# Patient Record
Sex: MALE | Race: WHITE
[De-identification: names, ages, dates, MRNs, and addresses within clinical notes are randomized per-mention and may not be internally consistent; named-entity substitution may affect disease eponyms.]

---

## 2018-02-08 ENCOUNTER — HOSPITAL ENCOUNTER (INPATIENT)
Dept: HOSPITAL 42 - ED | Age: 78
LOS: 12 days | Discharge: NURSING, REHAB OR LONG-TERM CARE | DRG: 477 | End: 2018-02-20
Attending: INTERNAL MEDICINE | Admitting: INTERNAL MEDICINE
Payer: MEDICARE

## 2018-02-08 VITALS — BODY MASS INDEX: 29.2 KG/M2

## 2018-02-08 DIAGNOSIS — N17.9: ICD-10-CM

## 2018-02-08 DIAGNOSIS — K57.30: ICD-10-CM

## 2018-02-08 DIAGNOSIS — I87.8: ICD-10-CM

## 2018-02-08 DIAGNOSIS — D72.819: ICD-10-CM

## 2018-02-08 DIAGNOSIS — E87.8: ICD-10-CM

## 2018-02-08 DIAGNOSIS — I48.1: ICD-10-CM

## 2018-02-08 DIAGNOSIS — C79.51: Primary | ICD-10-CM

## 2018-02-08 DIAGNOSIS — E87.5: ICD-10-CM

## 2018-02-08 DIAGNOSIS — E55.9: ICD-10-CM

## 2018-02-08 DIAGNOSIS — I08.1: ICD-10-CM

## 2018-02-08 DIAGNOSIS — Z80.8: ICD-10-CM

## 2018-02-08 DIAGNOSIS — I89.0: ICD-10-CM

## 2018-02-08 DIAGNOSIS — M47.9: ICD-10-CM

## 2018-02-08 DIAGNOSIS — J98.11: ICD-10-CM

## 2018-02-08 DIAGNOSIS — R17: ICD-10-CM

## 2018-02-08 DIAGNOSIS — K40.90: ICD-10-CM

## 2018-02-08 DIAGNOSIS — Z87.891: ICD-10-CM

## 2018-02-08 DIAGNOSIS — I48.0: ICD-10-CM

## 2018-02-08 DIAGNOSIS — N13.9: ICD-10-CM

## 2018-02-08 DIAGNOSIS — E87.2: ICD-10-CM

## 2018-02-08 DIAGNOSIS — M87.9: ICD-10-CM

## 2018-02-08 DIAGNOSIS — K59.00: ICD-10-CM

## 2018-02-08 DIAGNOSIS — N18.3: ICD-10-CM

## 2018-02-08 DIAGNOSIS — J43.9: ICD-10-CM

## 2018-02-08 DIAGNOSIS — E22.2: ICD-10-CM

## 2018-02-08 DIAGNOSIS — I48.2: ICD-10-CM

## 2018-02-08 DIAGNOSIS — Z79.01: ICD-10-CM

## 2018-02-08 DIAGNOSIS — M81.0: ICD-10-CM

## 2018-02-08 DIAGNOSIS — Z79.899: ICD-10-CM

## 2018-02-08 DIAGNOSIS — I50.43: ICD-10-CM

## 2018-02-08 DIAGNOSIS — E83.42: ICD-10-CM

## 2018-02-08 DIAGNOSIS — M10.9: ICD-10-CM

## 2018-02-08 DIAGNOSIS — E78.00: ICD-10-CM

## 2018-02-08 DIAGNOSIS — M16.11: ICD-10-CM

## 2018-02-08 DIAGNOSIS — E87.6: ICD-10-CM

## 2018-02-08 DIAGNOSIS — K42.9: ICD-10-CM

## 2018-02-08 DIAGNOSIS — R33.9: ICD-10-CM

## 2018-02-08 DIAGNOSIS — D64.9: ICD-10-CM

## 2018-02-08 DIAGNOSIS — R40.2412: ICD-10-CM

## 2018-02-08 DIAGNOSIS — Z91.19: ICD-10-CM

## 2018-02-08 DIAGNOSIS — I27.21: ICD-10-CM

## 2018-02-08 DIAGNOSIS — N28.1: ICD-10-CM

## 2018-02-08 DIAGNOSIS — D68.9: ICD-10-CM

## 2018-02-08 DIAGNOSIS — C61: ICD-10-CM

## 2018-02-08 DIAGNOSIS — N40.0: ICD-10-CM

## 2018-02-08 DIAGNOSIS — E78.5: ICD-10-CM

## 2018-02-08 DIAGNOSIS — I42.0: ICD-10-CM

## 2018-02-08 DIAGNOSIS — M40.204: ICD-10-CM

## 2018-02-08 DIAGNOSIS — I13.0: ICD-10-CM

## 2018-02-08 DIAGNOSIS — R31.0: ICD-10-CM

## 2018-02-08 LAB
% IRON SATURATION: 15 % (ref 20–55)
ALBUMIN SERPL-MCNC: 3.5 G/DL (ref 3–4.8)
ALBUMIN/GLOB SERPL: 1.1 {RATIO} (ref 1.1–1.8)
ALT SERPL-CCNC: 39 U/L (ref 7–56)
APPEARANCE UR: CLEAR
APTT BLD: 33.9 SECONDS (ref 25.1–36.5)
AST SERPL-CCNC: 43 U/L (ref 17–59)
BACTERIA #/AREA URNS HPF: (no result) /[HPF]
BASE EXCESS BLDV CALC-SCNC: -2.6 MMOL/L (ref 0–2)
BASE EXCESS BLDV CALC-SCNC: 0.2 MMOL/L (ref 0–2)
BASOPHILS # BLD AUTO: 0.06 K/MM3 (ref 0–2)
BASOPHILS NFR BLD: 1.9 % (ref 0–3)
BILIRUB UR-MCNC: NEGATIVE MG/DL
BNP SERPL-MCNC: (no result) PG/ML (ref 0–450)
BUN SERPL-MCNC: 13 MG/DL (ref 7–21)
CALCIUM SERPL-MCNC: 9.9 MG/DL (ref 8.4–10.5)
CK MB SERPL-MCNC: 5.7 NG/ML (ref 0–3.6)
CK MB%: 2.3 % (ref 2.5–3)
COLOR UR: YELLOW
EOSINOPHIL # BLD: 0.1 10*3/UL (ref 0–0.7)
EOSINOPHIL NFR BLD: 1.9 % (ref 1.5–5)
EPI CELLS #/AREA URNS HPF: (no result) /HPF (ref 0–5)
ERYTHROCYTE [DISTWIDTH] IN BLOOD BY AUTOMATED COUNT: 15.5 % (ref 11.5–14.5)
GFR NON-AFRICAN AMERICAN: 58
GLUCOSE UR STRIP-MCNC: NEGATIVE MG/DL
GRANULOCYTES # BLD: 1.54 10*3/UL (ref 1.4–6.5)
GRANULOCYTES NFR BLD: 50.1 % (ref 50–68)
HGB BLD-MCNC: 14.4 G/DL (ref 14–18)
INR PPP: 1.56 (ref 0.93–1.08)
IRON SERPL-MCNC: 51 UG/DL (ref 45–180)
LEUKOCYTE ESTERASE UR-ACNC: NEGATIVE LEU/UL
LYMPHOCYTES # BLD: 0.9 10*3/UL (ref 1.2–3.4)
LYMPHOCYTES NFR BLD AUTO: 30.5 % (ref 22–35)
MAGNESIUM SERPL-MCNC: 1.7 MG/DL (ref 1.7–2.2)
MCH RBC QN AUTO: 29 PG (ref 25–35)
MCHC RBC AUTO-ENTMCNC: 34.8 G/DL (ref 31–37)
MCV RBC AUTO: 83.5 FL (ref 80–105)
MONOCYTES # BLD AUTO: 0.5 10*3/UL (ref 0.1–0.6)
MONOCYTES NFR BLD: 15.6 % (ref 1–6)
PH BLDV: 7.33 [PH] (ref 7.32–7.43)
PH BLDV: 7.39 [PH] (ref 7.32–7.43)
PH UR STRIP: 6 [PH] (ref 4.7–8)
PLATELET # BLD: 164 10^3/UL (ref 120–450)
PMV BLD AUTO: 9.9 FL (ref 7–11)
PROT UR STRIP-MCNC: (no result) MG/DL
PROTHROMBIN TIME: 18 SECONDS (ref 9.4–12.5)
RBC # BLD AUTO: 4.96 10^6/UL (ref 3.5–6.1)
RBC # UR STRIP: (no result) /UL
RBC #/AREA URNS HPF: (no result) /HPF (ref 0–2)
SP GR UR STRIP: 1.01 (ref 1–1.03)
T4 FREE SERPL-MCNC: 1.5 NG/DL (ref 0.78–2.19)
T4 SERPL-MCNC: 5.9 UG/DL (ref 5.5–11)
TIBC SERPL-MCNC: 340 UG/DL (ref 261–462)
TROPONIN I SERPL-MCNC: 0.03 NG/ML
TROPONIN I SERPL-MCNC: 0.03 NG/ML
URATE SERPL-MCNC: 7.9 MG/DL (ref 3.5–8.5)
URINE NITRATE: NEGATIVE
UROBILINOGEN UR STRIP-ACNC: 0.2 E.U./DL
VENOUS BLOOD FIO2: 21 %
VENOUS BLOOD FIO2: 21 %
VENOUS BLOOD GAS PCO2: 36 (ref 40–60)
VENOUS BLOOD GAS PCO2: 51 (ref 40–60)
VENOUS BLOOD GAS PO2: 32 MM/HG (ref 30–55)
VENOUS BLOOD GAS PO2: 67 MM/HG (ref 30–55)
WBC # BLD AUTO: 3.1 10^3/UL (ref 4.5–11)
WBC #/AREA URNS HPF: (no result) /HPF (ref 0–6)

## 2018-02-08 RX ADMIN — DOBUTAMINE HYDROCHLORIDE PRN MLS/HR: 200 INJECTION INTRAVENOUS at 21:57

## 2018-02-08 RX ADMIN — ENOXAPARIN SODIUM SCH: 100 INJECTION SUBCUTANEOUS at 20:07

## 2018-02-08 NOTE — US
HISTORY:

Leg pain and swelling. Evaluate for DVT



PHYSICIAN(S):  Adam Shine MD.



TECHNIQUE:

Duplex sonography and color-flow Doppler with graded compression were 

used to evaluate the deep venous systems of both lower extremities. 

The exam is limited by edema.  The tibial veins are not well seen.



FINDINGS:

The visualized deep venous systems of both lower extremities are 

sonographically normal and compressible. Normal wave forms and 

augmentation are seen. There is no sonographic evidence for deep 

venous thrombosis in the visualized segments of both lower 

extremities.



IMPRESSION:

No sonographic evidence for deep venous thrombosis in the visualized 

segments of both lower extremities. none

## 2018-02-08 NOTE — ED PDOC
Arrival/HPI





- General


Time Seen by Provider: 02/08/18 15:20


Historian: Patient





- History of Present Illness


Narrative History of Present Illness (Text): 





02/08/18 15:27


78yo male with PMhx of hypertension bib BLS for b/l lower leg swelling/pain. 

States he only noticed the swelling yesterday. He reports remote history of 

gout and thought it was gout. He admits to BALLESTEROS for few weeks now. States he 

have not seen his PMD for a while now and have not taken any antitussive for a 

while now. He denies diaphoresis, chest pain, dizziness, nausea, vomiting, 

ripping/tearing upper back pain, trauma, any other complaint.





Past Medical History





- Provider Review


Nursing Documentation Reviewed: Yes





- Infectious Disease


Hx of Infectious Diseases: None





- Tetanus Immunization


Tetanus Immunization: Unknown





- Past Medical History


Past Medical History: No Previous





- Cardiac


Hx Hypertension: Yes





- Musculoskeletal/Rheumatological


Hx Musculoskeletal Disorders: Yes


Hx Unsteady Gait: Yes





- Psychiatric


Hx Depression: No


Hx Emotional Abuse: No


Hx Physical Abuse: No


Hx Substance Use: No





- Past Surgical History


Past Surgical History: No Previous





- Suicidal Assessment


Feels Threatened In Home Enviroment: No





Family/Social History





- Physician Review


Nursing Documentation Reviewed: Yes


Family/Social History: Unknown Family HX


Smoking Status: Never Smoked


Hx Alcohol Use: No


Hx Substance Use: No





Allergies/Home Meds


Allergies/Adverse Reactions: 


Allergies





No Known Allergies Allergy (Verified 01/28/14 15:34)


 








Home Medications: 


 Home Meds











 Medication  Instructions  Recorded  Confirmed


 


Cholecalciferol [Vitamin D 1000 IU] 2 cap PO DAILY 02/08/18 02/08/18


 


Febuxostat [Uloric] 1 tab PO DAILY 02/08/18 02/08/18


 


Folic Acid [Folic Acid] 4 mg PO DAILY 02/08/18 02/08/18


 


Magnesium Oxide [Mag-Ox] 1 tab PO BID 02/08/18 02/08/18


 


Nebivolol [Bystolic] 1 tab PO DAILY 02/08/18 02/08/18


 


Valsartan [Diovan] 1 tab PO DAILY 02/08/18 02/08/18


 


Warfarin [Coumadin] 1 tab PO DAILY 02/08/18 02/08/18


 


amLODIPine [Norvasc] 1 tab PO BID 02/08/18 02/08/18


 


hydrALAZINE [Apresoline] 1 tab PO QID 02/08/18 02/08/18














Review of Systems





- Physician Review


All systems were reviewed & negative as marked: Yes





- Review of Systems


Constitutional: Normal


Eyes: Normal


ENT: Normal


Respiratory: SOB


Cardiovascular: Edema, Calf Pain, BALLESTEROS.  absent: Chest Pain, Palpitations, 

Orthopnea, Syncope


Gastrointestinal: Normal


Genitourinary Male: Normal


Musculoskeletal: Normal


Skin: Normal


Neurological: Normal


Endocrine: Normal


Hemo/Lymphatic: Normal


Psychiatric: Normal





Physical Exam


Vital Signs Reviewed: Yes


Vital Signs











  Temp Pulse Resp BP Pulse Ox


 


 02/08/18 20:50   113 H   121/95 H 


 


 02/08/18 19:17   113 H  18  112/74  100


 


 02/08/18 16:45   86  18  107/81  100


 


 02/08/18 16:41     125/79 


 


 02/08/18 16:06   93 H  20  129/80  100


 


 02/08/18 15:52   105 H   135/92 H  100


 


 02/08/18 15:51    22   100


 


 02/08/18 15:41   154 H   157/99 H 


 


 02/08/18 15:40  97.3 F L  153 H  24  157/99 H  100











Temperature: Afebrile


Blood Pressure: Normal


Pulse: Regular


Respiratory Rate: Normal


Appearance: Positive for: Well-Appearing, Non-Toxic, Comfortable


Pain Distress: None


Mental Status: Positive for: Alert and Oriented X 3





- Systems Exam


Head: Present: Atraumatic, Normocephalic


Pupils: Present: PERRL


Extroacular Muscles: Present: EOMI


Conjunctiva: Present: Normal


Mouth: Present: Moist Mucous Membranes


Neck: Present: Normal Range of Motion


Respiratory/Chest: Present: Clear to Auscultation, Good Air Exchange, Other (

Crackles - scattered).  No: Respiratory Distress, Accessory Muscle Use, 

Decreased Breath Sounds, Rales, Retracting, Rhonchi


Cardiovascular: Present: Regular Rate and Rhythm, Normal S1, S2.  No: Murmurs


Abdomen: Present: Normal Bowel Sounds.  No: Tenderness, Distention, Peritoneal 

Signs


Back: Present: Normal Inspection


Upper Extremity: Present: Normal Inspection.  No: Cyanosis, Edema


Lower Extremity: Present: Edema (3+ Edema), CALF TENDERNESS (Left lower calf), 

NORMAL PULSES, Normal ROM, Neurovascularly Intact.  No: Tenderness


Neurological: Present: GCS=15, CN II-XII Intact, Speech Normal


Skin: Present: Warm, Dry, Normal Color.  No: Rashes


Psychiatric: Present: Alert, Oriented x 3, Normal Insight, Normal Concentration





Medical Decision Making


ED Course and Treatment: 





02/08/18 23:33


PT in ED for stated history. 





EKG Afib with RVR @ 169bpm. 





Rate improved with Cardizem bolus in ED and Heparin bolus was given in ED.





Lab was reviewed and hyponatremia was noted. Elevated lactic acid level was 

noted was noted in the first VBG, this was likely secondary to the Afib and 

Elevated BNP. Pt was afebrile and does not meet criteria for sepsis. 





Lasix was given for the elevated BNP





LE doppler was negative for DVT b/l





Case was DW Dr. Márquez, he accepted pt for admission and saw pt in ED at the 

bedside. He requested Dr. Farah consult.





cAse was DW Dr. Edge who is covering Dr. Farah and he request Lovonox 100mg 

SQ instead of the Heparin drip.





All result and plan was DW the pt and he agreed.




















- Lab Interpretations


Lab Results: 








 02/08/18 15:45 





 02/08/18 15:45 





 Lab Results





02/08/18 15:45: TSH 3rd Generation 2.34


02/08/18 15:45: Sodium 127 L, Chloride 92 L, Potassium 4.4, Carbon Dioxide 21, 

Anion Gap 19, BUN 13, Creatinine 1.2, Est GFR (African Amer) > 60, Est GFR (Non-

Af Amer) 58, Random Glucose 110, Calcium 9.9, Total Bilirubin 2.1 H, AST 43, 

ALT 39, Alkaline Phosphatase 112, Lactate Dehydrogenase 684, Total Creatine 

Kinase 245 H, CK-MB (CK-2) 5.7 H, CK-MB (CK-2) % 2.3 L, Troponin I 0.03  D, NT-

Pro-B Natriuret Pep 80797 H, Total Protein 6.7, Albumin 3.5, Globulin 3.2, 

Albumin/Globulin Ratio 1.1


02/08/18 15:45: pO2 67 H, VBG pH 7.39, VBG pCO2 36.0 L, VBG HCO3 21.8, VBG 

Total CO2 22.9, VBG O2 Sat (Calc) 95.8 H, VBG Base Excess -2.6 L, VBG Potassium 

4.7, Sodium 124.0 L, Chloride 91.0 L, Glucose 120 H, Lactate 2.5 H, FiO2 21.0, 

Venous Blood Potassium 4.7


02/08/18 15:45: WBC 3.1 L, RBC 4.96, Hgb 14.4, Hct 41.4 L, MCV 83.5, MCH 29.0, 

MCHC 34.8, RDW 15.5 H, Plt Count 164, MPV 9.9, Gran % 50.1, Lymph % (Auto) 30.5

, Mono % (Auto) 15.6 H, Eos % (Auto) 1.9, Baso % (Auto) 1.9, Gran # 1.54, Lymph 

# (Auto) 0.9 L, Mono # (Auto) 0.5, Eos # (Auto) 0.1, Baso # (Auto) 0.06


02/08/18 15:45: PT 18.0 H, INR 1.56 H, APTT 33.9











- RAD Interpretation


Radiology Orders: 








02/08/18 15:25


DUPLEX LOWER EXTRM VEIN BILAT [US] Stat 





02/08/18 15:26


CHEST PORTABLE [RAD] Stat 














- Medication Orders


Current Medication Orders: 








Allopurinol (Zyloprim)  300 mg PO DAILY [facility]


Aspirin (Ecotrin)  81 mg PO DAILY [facility]


   Last Admin: 02/08/18 20:48  Dose: 81 mg





Atorvastatin Calcium (Lipitor)  40 mg PO DIN [facility]


Docusate Sodium (Colace)  100 mg PO TID [facility]


Enoxaparin Sodium (Lovenox)  100 mg SC Q12H [facility]


   PRN Reason: Protocol


   Last Admin: 02/08/18 20:07  Dose:  





Furosemide (Lasix)  40 mg IVP Q12H [facility]


   Last Admin: 02/08/18 20:50  Dose: 40 mg





MAR Blood Pressure


 Document     02/08/18 20:50  GMD  (Rec: 02/08/18 20:50  GMD  [id number])


     Blood Pressure


      Blood Pressure (100//90)             121/95


IVP Administration


 Document     02/08/18 20:50  GMD  (Rec: 02/08/18 20:50  GMD  [id number])


     Charges for Administration


      # of IVP Administrations                   1





Dobutamine HCl/Dextrose (Dobutamine/Dextrose 5% 500mg/250ml)  500 mg in 250 mls 

@ 14.696 mls/hr IV .Q17H1M PRN; Protocol; 5 MCG/KG/MIN


   PRN Reason: TITRATE PER PROTOCOL


   Last Admin: 02/08/18 21:57  Dose: 5 mcg/kg/min, 14.696 mls/hr





eMAR Start Stop


 Document     02/08/18 21:57  HAILEY  (Rec: 02/08/18 21:58  HAILEY  [id number])


     Intravenous Solution


      Start Date                                 02/08/18


      Start Time                                 21:58


MAR Pulse and Blood Pressure


 Document     02/08/18 21:57  HAILEY  (Rec: 02/08/18 21:58    [id number])


     Pulse


      Pulse Rate (60-90)                         115


     Blood Pressure


      Blood Pressure (100//90)             131/95


Titration Intervention


 Document     02/08/18 21:57  HAILEY  (Rec: 02/08/18 21:58  HAILEY  [id number])


     Titration Intake


      Waste Amount                               0


      Container Volume                           250


     Titration Dosing


      Titration Dose                             5


      IV Rate                                    14.696


      Intake/Decrease                            Started





Metoprolol Tartrate (Lopressor)  25 mg PO Q12H [facility]


   Last Admin: 02/08/18 20:50  Dose: 25 mg





MAR Pulse and Blood Pressure


 Document     02/08/18 20:50  GMD  (Rec: 02/08/18 20:50  GMD  [id number])


     Pulse


      Pulse Rate (60-90)                         113


     Blood Pressure


      Blood Pressure (100//90)             121/95





Pantoprazole Sodium (Protonix Ec Tab)  20 mg PO 0600,1600 [facility]AS





Discontinued Medications





Diltiazem HCl (Cardizem)  15 mg IVP STAT STA


   Stop: 02/08/18 15:36


   Last Admin: 02/08/18 15:40  Dose:  





Diltiazem HCl (Cardizem)  20 mg IVP STAT STA


   Stop: 02/08/18 15:40


   Last Admin: 02/08/18 15:41  Dose: 20 mg





IVP Administration


 Document     02/08/18 15:41  GMD  (Rec: 02/08/18 15:41  GMD  [id number])


     Charges for Administration


      # of IVP Administrations                   1


MAR Pulse and Blood Pressure


 Document     02/08/18 15:41  GMD  (Rec: 02/08/18 15:41  GMD  [id number])


     Pulse


      Pulse Rate (60-90)                         154


     Blood Pressure


      Blood Pressure (100//90)             157/99





Enoxaparin Sodium (Lovenox)  100 mg SC ONCE STA


   PRN Reason: Protocol


   Stop: 02/08/18 17:14


   Last Admin: 02/08/18 17:37  Dose: 100 mg





Subcutaneous Administrations


 Document     02/08/18 17:37  GMD  (Rec: 02/08/18 17:37  GMD  [id number])


     Injection Site


      MAR Injection Site                         Left Abdomen


     Charges for Administration


      # of Subcutaneous Administrations          1





Furosemide (Lasix)  40 mg IVP STAT STA


   Stop: 02/08/18 16:33


   Last Admin: 02/08/18 16:41  Dose: 40 mg





MAR Blood Pressure


 Document     02/08/18 16:41  GMD  (Rec: 02/08/18 16:41  GMD  [id number])


     Blood Pressure


      Blood Pressure (100//90)             125/79


IVP Administration


 Document     02/08/18 16:41  GMD  (Rec: 02/08/18 16:41  GMD  [id number])


     Charges for Administration


      # of IVP Administrations                   1





Heparin Sodium (Porcine) (Heparin)  4,000 units IV ONCE ONE


   PRN Reason: Protocol


   Stop: 02/08/18 16:49


   Last Admin: 02/08/18 17:37  Dose: 4,000 units





eMAR Start Stop


 Document     02/08/18 17:37  GMD  (Rec: 02/08/18 17:37  GMD  [id number])


     Intravenous Solution


      Start Date                                 02/08/18


      Start Time                                 17:37














Disposition/Present on Arrival





- Present on Arrival


Any Indicators Present on Arrival: No


History of DVT/PE: No


History of Uncontrolled Diabetes: No


Urinary Catheter: No


History Surgical Site Infection Following: None





- Disposition


Have Diagnosis and Disposition been Completed?: Yes


Diagnosis: 


 Atrial fibrillation, CHF (congestive heart failure), Hyponatremia





Disposition: HOSPITALIZED


Disposition Time: 17:00


Patient Problems: 


 Current Active Problems











Problem Status Onset


 


Atrial fibrillation Acute  


 


CHF (congestive heart failure) Acute  


 


Hyponatremia Acute  











Condition: FAIR

## 2018-02-08 NOTE — CT
EXAM:

  CT Chest Without Intravenous Contrast



CLINICAL HISTORY:

  77 years old, male; Signs and symptoms; Other: Bilateral leg swelling; 

Shortness of breath; Additional info: Chf/anasarca



TECHNIQUE:

  Axial computed tomography images of the chest without intravenous contrast.  

All CT scans at this facility use one or more dose reduction techniques, viz.: 

automated exposure control; ma/kV adjustment per patient size (including 

targeted exams where dose is matched to indication; i.e. head); or iterative 

reconstruction technique.

  Coronal and sagittal reformatted images were created and reviewed.



COMPARISON:

  No relevant prior studies available.



FINDINGS:

  Limitations:  Lack of intravenous contrast.  Motion artifact - mild.

  Lungs:  Early to mild centrilobular and paraseptal emphysematous changes.  

Mild atelectasis/scarring.  No consolidation.  Few pulmonary nodules, up to 0.3 

cm.

  Pleural space:  Small bilateral pleural effusions, right greater than left.

  Heart:  Mild cardiomegaly.  No significant pericardial effusion.  Coronary 

artery calcifications.

  Bones/joints:  Few scattered sclerotic lesions within visualized bones.  Mild 

degenerative changes of spine.  No acute fracture.

  Soft tissues:  Mild-to-moderate diffuse stranding within subcutaneous tissues.

  Vasculature:  Minimal atherosclerotic disease.

  Lymph nodes:  No pathologically enlarged lymph nodes.



IMPRESSION:     

1.  Mild anasarca.

2.  Bilateral pleural effusions, RIGHT > LEFT.

3.  Emphysema.

4.  Sclerotic bone lesions compatible with metastases.  Clinical correlation is 

needed.

5.  Pulmonary nodules.  For low-risk patients, no follow-up is necessary.  For 

high-risk patients (smoking history or other known risk factors) an optional CT 

at 12 months could be performed.

6.  Incidental/non-acute findings are described above.



_______________________________________________



EXAM:

  CT Abdomen and Pelvis Without Intravenous Contrast



CLINICAL HISTORY:

  77 years old, male; Signs and symptoms; Other: Bilateral leg swelling; 

Shortness of breath; Additional info: Chf/anasarca



TECHNIQUE:

  Axial computed tomography images of the abdomen and pelvis without 

intravenous contrast.  All CT scans at this facility use one or more dose 

reduction techniques, viz.: automated exposure control; ma/kV adjustment per 

patient size (including targeted exams where dose is matched to indication; 

i.e. head); or iterative reconstruction technique.

  Coronal and sagittal reformatted images were created and reviewed.



COMPARISON:

  No relevant prior studies available.



FINDINGS:

  Limitations:  Lack of intravenous contrast.  Motion artifact - mild.



 ABDOMEN:

  Liver:  Unremarkable.

  Gallbladder and bile ducts:  No calcified stones.  No ductal dilation.

  Pancreas:  Unremarkable.  No ductal dilation.

  Spleen:  No splenomegaly.

  Adrenals:  Mild hypertrophy of adrenal glands.

  Kidneys and ureters:  Scarring of right kidney.  Probable RIGHT renal cyst.  

Too small to characterize lesion within LEFT kidney.  Small vascular 

calcification vs calculus within LEFT kidney.  No hydronephrosis.

  Stomach and bowel:  Few scattered diverticula within colon.  No associated 

inflammatory stranding.  No definite mural thickening.  No obstruction.

  Appendix:  No findings to suggest acute appendicitis.



 PELVIS:

  Bladder:  Unremarkable.  No stones.

  Reproductive:  Unremarkable as visualized.



 ABDOMEN and PELVIS:

  Intraperitoneal space:  Small free fluid within abdomen.

  Bones/joints:  Several scattered sclerotic lesions within the visualized 

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  Degenerative 

changes of spine.  No acute fracture.

  Soft tissues:  Moderate diffuse stranding throughout subcutaneous tissues.  

Small umbilical hernia containing fat.  Small inguinal hernias containing fat 

and fluid.

  Vasculature:  Moderate atherosclerotic disease.  No aneurysm.

  Lymph nodes:  Few subcentimeter short axis retroperitoneal lymph nodes.



IMPRESSION:     

1.  Moderate anasarca.

2.  Small ascites.

3.  Sclerotic bone lesions compatible with metastases.  Clinical correlation is 

needed.

4.  Incidental/non-acute findings are described above.

## 2018-02-08 NOTE — RAD
HISTORY:

SOB  



COMPARISON:

01/28/2014 



FINDINGS:



LUNGS:

No active pulmonary disease.



PLEURA:

No significant pleural effusion identified, no pneumothorax apparent.



CARDIOVASCULAR:

Cardiomegaly.  No evidence of acute, significant cardiovascular 

disease.  This represents a new finding compared to the prior study. 



OSSEOUS STRUCTURES:

No significant abnormalities.



VISUALIZED UPPER ABDOMEN:

Normal.



OTHER FINDINGS:

None.



IMPRESSION:

Cardiomegaly without evidence CHF. No active pulmonary disease.

## 2018-02-08 NOTE — CARD
--------------- APPROVED REPORT --------------





EKG Measurement

Heart [id number]

[id number]

[id number]

[id number]



<Conclusion>

Atrial fibrillation with premature ventricular or aberrantly 

conducted complexes

Low voltage QRS

T wave abnormality, consider anterolateral ischemia or digitalis 

effect

Prolonged QT

Abnormal ECG

## 2018-02-09 LAB
ALBUMIN SERPL-MCNC: 3.2 G/DL (ref 3–4.8)
ALBUMIN/GLOB SERPL: 1 {RATIO} (ref 1.1–1.8)
ALT SERPL-CCNC: 31 U/L (ref 7–56)
AST SERPL-CCNC: 40 U/L (ref 17–59)
BASOPHILS # BLD AUTO: 0.04 K/MM3 (ref 0–2)
BASOPHILS NFR BLD: 1.1 % (ref 0–3)
BILIRUB DIRECT SERPL-MCNC: 1.3 MG/DL (ref 0–0.4)
BUN SERPL-MCNC: 13 MG/DL (ref 7–21)
CALCIUM SERPL-MCNC: 9.3 MG/DL (ref 8.4–10.5)
CREATININE,RANDOM URINE: 11 MG/DL
EOSINOPHIL # BLD: 0.1 10*3/UL (ref 0–0.7)
EOSINOPHIL NFR BLD: 1.3 % (ref 1.5–5)
ERYTHROCYTE [DISTWIDTH] IN BLOOD BY AUTOMATED COUNT: 15.6 % (ref 11.5–14.5)
FERRITIN SERPL-MCNC: 206 NG/ML
FOLATE SERPL-MCNC: 6.7 NG/ML
GFR NON-AFRICAN AMERICAN: 58
GRANULOCYTES # BLD: 2.32 10*3/UL (ref 1.4–6.5)
GRANULOCYTES NFR BLD: 62 % (ref 50–68)
HGB BLD-MCNC: 13.3 G/DL (ref 14–18)
LYMPHOCYTES # BLD: 0.8 10*3/UL (ref 1.2–3.4)
LYMPHOCYTES NFR BLD AUTO: 21.7 % (ref 22–35)
MAGNESIUM SERPL-MCNC: 1.5 MG/DL (ref 1.7–2.2)
MCH RBC QN AUTO: 28.5 PG (ref 25–35)
MCHC RBC AUTO-ENTMCNC: 34.3 G/DL (ref 31–37)
MCV RBC AUTO: 83.3 FL (ref 80–105)
MONOCYTES # BLD AUTO: 0.5 10*3/UL (ref 0.1–0.6)
MONOCYTES NFR BLD: 13.9 % (ref 1–6)
PLATELET # BLD: 164 10^3/UL (ref 120–450)
PMV BLD AUTO: 10 FL (ref 7–11)
RBC # BLD AUTO: 4.66 10^6/UL (ref 3.5–6.1)
TROPONIN I SERPL-MCNC: 0.04 NG/ML
VIT B12 SERPL-MCNC: 896 PG/ML (ref 239–931)
WBC # BLD AUTO: 3.7 10^3/UL (ref 4.5–11)

## 2018-02-09 RX ADMIN — DOBUTAMINE HYDROCHLORIDE PRN MLS/HR: 200 INJECTION INTRAVENOUS at 17:37

## 2018-02-09 RX ADMIN — POTASSIUM CHLORIDE SCH MEQ: 20 TABLET, EXTENDED RELEASE ORAL at 08:40

## 2018-02-09 RX ADMIN — ENOXAPARIN SODIUM SCH MG: 100 INJECTION SUBCUTANEOUS at 08:40

## 2018-02-09 RX ADMIN — PANTOPRAZOLE SODIUM SCH MG: 20 TABLET, DELAYED RELEASE ORAL at 05:58

## 2018-02-09 RX ADMIN — PANTOPRAZOLE SODIUM SCH MG: 20 TABLET, DELAYED RELEASE ORAL at 17:39

## 2018-02-09 RX ADMIN — ENOXAPARIN SODIUM SCH MG: 100 INJECTION SUBCUTANEOUS at 20:40

## 2018-02-09 NOTE — CP.PCM.CON
History of Present Illness





- History of Present Illness


History of Present Illness: 





Consult for Hyponatremia








HPI: 78 yo M w/ pmh of CHF, enlarged prostate, afib that presented w/ LE edema. 

Pt is a poor historian but the issue seems to have been ongoing for several 

weeks but has been worsening. He has not been compliant with any of his 

medications. He last saw his PCP about a year ago.  He endorses some mild sob. 

he  endorses very frequent overnight urination and straining w/ urination. He 

denies any n/v. He denies any fever or chills.








A  full detailed ROS is negative except as above








pmh: chf, htn, bph, elevated psa





meds: reviewed below


nkda





famhx: no esrd





sochx: hx of smoke, hx of etoh, no ivdu








pe:


vs as below


gen: nad


sclera: anicteric


op: clear


neck: supple


cv: +s1+s2


lungs: dec bs at bases


abd: soft


ext: 1+ edema


neuro: a+ox3


psych: nml affect


skin: no rash








labs and imaging reviewed





imp:


Hyponatremia/ Acute on Chronic systolic heart failure/  Anemia / BPH/ 

Obstructive uropathy / hypomag








plan:


Na probably from hypervolemic hyponatremia from chf.  Reasonable to continue 

gentle diuresis.  Will check serum , urine osm, urine lytes though likely to 

see high u na w/ the lasix. 


Given elevated post voids audi lget kim. 


F/u w/ urology - w/ high PSA and concern for bone mets concern for prostate ca


replete magnesium


hgb stable


f/u cardiology





Past Patient History





- Infectious Disease


Hx of Infectious Diseases: None





- Tetanus Immunizations


Tetanus Immunization: Unknown





- Past Social History


Smoking Status: Never Smoked





- CARDIAC


Hx Hypertension: Yes





- PULMONARY


Hx Respiratory Disorders: No





- NEUROLOGICAL


Hx Neurological Disorder: No





- HEENT


Hx HEENT Problems: No





- RENAL


Hx Chronic Kidney Disease: No





- ENDOCRINE/METABOLIC


Hx Endocrine Disorders: No





- HEMATOLOGICAL/ONCOLOGICAL


Hx Blood Disorders: No





- INTEGUMENTARY


Hx Dermatological Problems: No





- MUSCULOSKELETAL/RHEUMATOLOGICAL


Hx Musculoskeletal Disorders: Yes


Hx Unsteady Gait: Yes





- GASTROINTESTINAL


Hx Gastrointestinal Disorders: No





- GENITOURINARY/GYNECOLOGICAL


Hx Genitourinary Disorders: No





- PSYCHIATRIC


Hx Depression: No


Hx Emotional Abuse: No


Hx Physical Abuse: No


Hx Substance Use: No





- SURGICAL HISTORY


Hx Surgeries: No





- ANESTHESIA


Hx Anesthesia: No





Meds


Allergies/Adverse Reactions: 


 Allergies











Allergy/AdvReac Type Severity Reaction Status Date / Time


 


No Known Allergies Allergy   Verified 01/28/14 15:34














- Medications


Medications: 


 Current Medications





Allopurinol (Zyloprim)  300 mg PO DAILY [facility]


Aspirin (Ecotrin)  81 mg PO DAILY [facility]


   Last Admin: 02/08/18 20:48 Dose:  81 mg


Atorvastatin Calcium (Lipitor)  40 mg PO DIN [facility]


Docusate Sodium (Colace)  100 mg PO TID [facility]


Enoxaparin Sodium (Lovenox)  100 mg SC Q12H [facility]


   PRN Reason: Protocol


   Last Admin: 02/09/18 08:40 Dose:  100 mg


Furosemide (Lasix)  40 mg IVP Q12H [facility]


   Last Admin: 02/09/18 08:40 Dose:  40 mg


Dobutamine HCl/Dextrose (Dobutamine/Dextrose 5% 500mg/250ml)  500 mg in 250 mls 

@ 14.696 mls/hr IV .Q17H1M PRN; Protocol; 5 MCG/KG/MIN


   PRN Reason: TITRATE PER PROTOCOL


   Last Admin: 02/08/18 21:57 Dose:  5 mcg/kg/min, 14.696 mls/hr


Magnesium Sulfate 2 gm/ Sodium (Chloride)  104 mls @ 102 mls/hr IVPB Q3H [facility]


   Stop: 02/09/18 12:32


   Last Admin: 02/09/18 09:41 Dose:  102 mls/hr


Magnesium Oxide (Mag-Ox)  400 mg PO BID [facility]


Metoprolol Tartrate (Lopressor)  25 mg PO Q12H [facility]


   Last Admin: 02/09/18 08:40 Dose:  25 mg


Pantoprazole Sodium (Protonix Ec Tab)  20 mg PO 0600,1600 [facility]


   Last Admin: 02/09/18 05:58 Dose:  20 mg


Potassium Chloride (K-Dur 20 Meq Er Tab)  20 meq PO BRK [facility]


   Last Admin: 02/09/18 08:40 Dose:  20 meq











Results





- Vital Signs


Recent Vital Signs: 


 Last Vital Signs











Temp  97.4 F L  02/09/18 06:00


 


Pulse  118 H  02/09/18 08:40


 


Resp  20   02/09/18 06:00


 


BP  139/72   02/09/18 08:40


 


Pulse Ox  99   02/09/18 06:00














- Labs


Result Diagrams: 


 02/09/18 05:30





 02/09/18 05:30


Labs: 


 Laboratory Results - last 24 hr











  02/08/18 02/08/18 02/08/18





  17:20 17:20 19:25


 


WBC   


 


RBC   


 


Hgb   


 


Hct   


 


MCV   


 


MCH   


 


MCHC   


 


RDW   


 


Plt Count   


 


MPV   


 


Gran %   


 


Lymph % (Auto)   


 


Mono % (Auto)   


 


Eos % (Auto)   


 


Baso % (Auto)   


 


Gran #   


 


Lymph # (Auto)   


 


Mono # (Auto)   


 


Eos # (Auto)   


 


Baso # (Auto)   


 


pO2    32


 


VBG pH    7.33


 


VBG pCO2    51.0


 


VBG HCO3    26.9


 


VBG Total CO2    28.5 H


 


VBG O2 Sat (Calc)    61.1


 


VBG Base Excess    0.2


 


VBG Potassium    4.5


 


Sodium    126.0 L


 


Chloride    93.0 L


 


Glucose    108


 


Lactate    1.7


 


FiO2    21.0


 


Potassium   


 


Carbon Dioxide   


 


Anion Gap   


 


BUN   


 


Creatinine   


 


Est GFR ( Amer)   


 


Est GFR (Non-Af Amer)   


 


Random Glucose   


 


Lactic Acid   


 


Uric Acid   


 


Calcium   


 


Magnesium   


 


Iron   


 


TIBC   


 


% Saturation   


 


Total Bilirubin   


 


Direct Bilirubin   


 


AST   


 


ALT   


 


Alkaline Phosphatase   


 


Total Creatine Kinase   


 


Troponin I   


 


Total Protein   


 


Albumin   


 


Globulin   


 


Albumin/Globulin Ratio   


 


Prostate Specific Ag   


 


Free T4   


 


Thyroxine (T4)   


 


Venous Blood Potassium    4.5


 


Urine Color  Yellow  


 


Urine Appearance  Clear  


 


Urine pH  6.0  


 


Ur Specific Gravity  1.010  


 


Urine Protein  Trace H  


 


Urine Glucose (UA)  Negative  


 


Urine Ketones  Negative  


 


Urine Blood  Trace-intact H  


 


Urine Nitrate  Negative  


 


Urine Bilirubin  Negative  


 


Urine Urobilinogen  0.2  


 


Ur Leukocyte Esterase  Negative  


 


Urine RBC  0 - 2  


 


Urine WBC  0 - 2  


 


Ur Epithelial Cells  0 - 2  


 


Urine Bacteria  Trace  


 


Urine Opiates Screen   Negative 


 


Urine Methadone Screen   Negative 


 


Ur Barbiturates Screen   Negative 


 


Ur Phencyclidine Scrn   Negative 


 


Ur Amphetamines Screen   Negative 


 


U Benzodiazepines Scrn   Negative 


 


U Oth Cocaine Metabols   Negative 


 


U Cannabinoids Screen   Negative 














  02/08/18 02/08/18 02/08/18





  19:25 19:25 19:25


 


WBC   


 


RBC   


 


Hgb   


 


Hct   


 


MCV   


 


MCH   


 


MCHC   


 


RDW   


 


Plt Count   


 


MPV   


 


Gran %   


 


Lymph % (Auto)   


 


Mono % (Auto)   


 


Eos % (Auto)   


 


Baso % (Auto)   


 


Gran #   


 


Lymph # (Auto)   


 


Mono # (Auto)   


 


Eos # (Auto)   


 


Baso # (Auto)   


 


pO2   


 


VBG pH   


 


VBG pCO2   


 


VBG HCO3   


 


VBG Total CO2   


 


VBG O2 Sat (Calc)   


 


VBG Base Excess   


 


VBG Potassium   


 


Sodium   


 


Chloride   


 


Glucose   


 


Lactate   


 


FiO2   


 


Potassium   


 


Carbon Dioxide   


 


Anion Gap   


 


BUN   


 


Creatinine   


 


Est GFR ( Amer)   


 


Est GFR (Non-Af Amer)   


 


Random Glucose   


 


Lactic Acid   


 


Uric Acid  7.9  


 


Calcium   


 


Magnesium  1.7  


 


Iron    51


 


TIBC    340


 


% Saturation    15 L


 


Total Bilirubin   


 


Direct Bilirubin   


 


AST   


 


ALT   


 


Alkaline Phosphatase   


 


Total Creatine Kinase  213  


 


Troponin I  0.03  


 


Total Protein   


 


Albumin   


 


Globulin   


 


Albumin/Globulin Ratio   


 


Prostate Specific Ag   97.4 H 


 


Free T4   1.50 


 


Thyroxine (T4)   5.9 


 


Venous Blood Potassium   


 


Urine Color   


 


Urine Appearance   


 


Urine pH   


 


Ur Specific Gravity   


 


Urine Protein   


 


Urine Glucose (UA)   


 


Urine Ketones   


 


Urine Blood   


 


Urine Nitrate   


 


Urine Bilirubin   


 


Urine Urobilinogen   


 


Ur Leukocyte Esterase   


 


Urine RBC   


 


Urine WBC   


 


Ur Epithelial Cells   


 


Urine Bacteria   


 


Urine Opiates Screen   


 


Urine Methadone Screen   


 


Ur Barbiturates Screen   


 


Ur Phencyclidine Scrn   


 


Ur Amphetamines Screen   


 


U Benzodiazepines Scrn   


 


U Oth Cocaine Metabols   


 


U Cannabinoids Screen   














  02/08/18 02/08/18 02/09/18





  21:00 23:15 05:30


 


WBC   


 


RBC   


 


Hgb   


 


Hct   


 


MCV   


 


MCH   


 


MCHC   


 


RDW   


 


Plt Count   


 


MPV   


 


Gran %   


 


Lymph % (Auto)   


 


Mono % (Auto)   


 


Eos % (Auto)   


 


Baso % (Auto)   


 


Gran #   


 


Lymph # (Auto)   


 


Mono # (Auto)   


 


Eos # (Auto)   


 


Baso # (Auto)   


 


pO2   


 


VBG pH   


 


VBG pCO2   


 


VBG HCO3   


 


VBG Total CO2   


 


VBG O2 Sat (Calc)   


 


VBG Base Excess   


 


VBG Potassium   


 


Sodium    129 L


 


Chloride    94 L


 


Glucose   


 


Lactate   


 


FiO2   


 


Potassium    3.8


 


Carbon Dioxide    23


 


Anion Gap    15


 


BUN    13


 


Creatinine    1.2


 


Est GFR ( Amer)    > 60


 


Est GFR (Non-Af Amer)    58


 


Random Glucose    102


 


Lactic Acid  1.7  


 


Uric Acid   


 


Calcium    9.3


 


Magnesium    1.5 L


 


Iron   


 


TIBC   


 


% Saturation   


 


Total Bilirubin    2.2 H


 


Direct Bilirubin    1.3 H


 


AST    40


 


ALT    31


 


Alkaline Phosphatase    88


 


Total Creatine Kinase   229  217


 


Troponin I    0.04  D


 


Total Protein    6.3


 


Albumin    3.2


 


Globulin    3.1


 


Albumin/Globulin Ratio    1.0 L


 


Prostate Specific Ag   


 


Free T4   


 


Thyroxine (T4)   


 


Venous Blood Potassium   


 


Urine Color   


 


Urine Appearance   


 


Urine pH   


 


Ur Specific Gravity   


 


Urine Protein   


 


Urine Glucose (UA)   


 


Urine Ketones   


 


Urine Blood   


 


Urine Nitrate   


 


Urine Bilirubin   


 


Urine Urobilinogen   


 


Ur Leukocyte Esterase   


 


Urine RBC   


 


Urine WBC   


 


Ur Epithelial Cells   


 


Urine Bacteria   


 


Urine Opiates Screen   


 


Urine Methadone Screen   


 


Ur Barbiturates Screen   


 


Ur Phencyclidine Scrn   


 


Ur Amphetamines Screen   


 


U Benzodiazepines Scrn   


 


U Oth Cocaine Metabols   


 


U Cannabinoids Screen   














  02/09/18 02/09/18 02/09/18





  05:30 05:30 05:30


 


WBC  3.7 L  


 


RBC  4.66  


 


Hgb  13.3 L  


 


Hct  38.8 L  


 


MCV  83.3  


 


MCH  28.5  


 


MCHC  34.3  


 


RDW  15.6 H  


 


Plt Count  164  


 


MPV  10.0  


 


Gran %  62.0  


 


Lymph % (Auto)  21.7 L  


 


Mono % (Auto)  13.9 H  


 


Eos % (Auto)  1.3 L  


 


Baso % (Auto)  1.1  


 


Gran #  2.32  


 


Lymph # (Auto)  0.8 L  


 


Mono # (Auto)  0.5  


 


Eos # (Auto)  0.1  


 


Baso # (Auto)  0.04  


 


pO2   


 


VBG pH   


 


VBG pCO2   


 


VBG HCO3   


 


VBG Total CO2   


 


VBG O2 Sat (Calc)   


 


VBG Base Excess   


 


VBG Potassium   


 


Sodium   


 


Chloride   


 


Glucose   


 


Lactate   


 


FiO2   


 


Potassium   


 


Carbon Dioxide   


 


Anion Gap   


 


BUN   


 


Creatinine   


 


Est GFR ( Amer)   


 


Est GFR (Non-Af Amer)   


 


Random Glucose   


 


Lactic Acid    1.1


 


Uric Acid   


 


Calcium   


 


Magnesium   


 


Iron   


 


TIBC   


 


% Saturation   


 


Total Bilirubin   


 


Direct Bilirubin   


 


AST   


 


ALT   


 


Alkaline Phosphatase   


 


Total Creatine Kinase   


 


Troponin I   


 


Total Protein   


 


Albumin   


 


Globulin   


 


Albumin/Globulin Ratio   


 


Prostate Specific Ag   90.5 H 


 


Free T4   


 


Thyroxine (T4)   


 


Venous Blood Potassium   


 


Urine Color   


 


Urine Appearance   


 


Urine pH   


 


Ur Specific Gravity   


 


Urine Protein   


 


Urine Glucose (UA)   


 


Urine Ketones   


 


Urine Blood   


 


Urine Nitrate   


 


Urine Bilirubin   


 


Urine Urobilinogen   


 


Ur Leukocyte Esterase   


 


Urine RBC   


 


Urine WBC   


 


Ur Epithelial Cells   


 


Urine Bacteria   


 


Urine Opiates Screen   


 


Urine Methadone Screen   


 


Ur Barbiturates Screen   


 


Ur Phencyclidine Scrn   


 


Ur Amphetamines Screen   


 


U Benzodiazepines Scrn   


 


U Oth Cocaine Metabols   


 


U Cannabinoids Screen

## 2018-02-09 NOTE — CP.PCM.CON
History of Present Illness





- History of Present Illness


History of Present Illness: 





77 year old male with a history of CHF, afib, admitted with LE swelling, found 

to have bone/lung lesions, elevated PSA concerning for prostate cancer.  The 

patient reports to worsening LE swelling which prompted him to come to the 

hospital.  He denies bone pain.  He has had difficulty passing his urine for 

about 2 weeks but no dysuria or fevers.  He also admits to shortness of breath 

at times with exertion.  A CT and bone scan showed diffuse bone lesions.  His 

CT scan showed subcentimeter lung lesions and B/L pleural effusions.





Past medical history:  CHF, afib





Past surgical history:  None





Family history:  Father had throat cancer





Social history:  Former 1/2ppd x 50 years, former alcohol, denies illicit drug 

use.





Allergies:  NKA





Review of systems:  All remaining review of systems including HEENT, 

cardiovascular, respiratory, gastrointestinal, genitourinary, musculoskeletal, 

dermatologic, neurologic and psychiatric are negative unless mentioned in the 

HPI.





Past Patient History





- Infectious Disease


Hx of Infectious Diseases: None





- Tetanus Immunizations


Tetanus Immunization: Unknown





- Past Social History


Smoking Status: Never Smoked





- CARDIAC


Hx Hypertension: Yes





- PULMONARY


Hx Respiratory Disorders: No





- NEUROLOGICAL


Hx Neurological Disorder: No





- HEENT


Hx HEENT Problems: No





- RENAL


Hx Chronic Kidney Disease: No





- ENDOCRINE/METABOLIC


Hx Endocrine Disorders: No





- HEMATOLOGICAL/ONCOLOGICAL


Hx Blood Disorders: No





- INTEGUMENTARY


Hx Dermatological Problems: No





- MUSCULOSKELETAL/RHEUMATOLOGICAL


Hx Musculoskeletal Disorders: Yes


Hx Unsteady Gait: Yes





- GASTROINTESTINAL


Hx Gastrointestinal Disorders: No





- GENITOURINARY/GYNECOLOGICAL


Hx Genitourinary Disorders: No





- PSYCHIATRIC


Hx Depression: No


Hx Emotional Abuse: No


Hx Physical Abuse: No


Hx Substance Use: No





- SURGICAL HISTORY


Hx Surgeries: No





- ANESTHESIA


Hx Anesthesia: No





Meds


Allergies/Adverse Reactions: 


 Allergies











Allergy/AdvReac Type Severity Reaction Status Date / Time


 


No Known Allergies Allergy   Verified 01/28/14 15:34














- Medications


Medications: 


 Current Medications





Allopurinol (Zyloprim)  300 mg PO DAILY [facility]


   Last Admin: 02/09/18 10:12 Dose:  300 mg


Aspirin (Ecotrin)  81 mg PO DAILY [facility]


   Last Admin: 02/09/18 10:12 Dose:  81 mg


Atorvastatin Calcium (Lipitor)  40 mg PO DIN [facility]


   Last Admin: 02/09/18 17:35 Dose:  40 mg


Docusate Sodium (Colace)  100 mg PO TID [facility]


   Last Admin: 02/09/18 17:34 Dose:  100 mg


Enoxaparin Sodium (Lovenox)  100 mg SC Q12H [facility]


   PRN Reason: Protocol


   Last Admin: 02/09/18 08:40 Dose:  100 mg


Furosemide (Lasix)  40 mg IVP Q12H [facility]


   Last Admin: 02/09/18 08:40 Dose:  40 mg


Dobutamine HCl/Dextrose (Dobutamine/Dextrose 5% 500mg/250ml)  500 mg in 250 mls 

@ 14.696 mls/hr IV .Q17H1M PRN; Protocol; 5 MCG/KG/MIN


   PRN Reason: TITRATE PER PROTOCOL


   Last Admin: 02/09/18 17:37 Dose:  5 mcg/kg/min, 14.696 mls/hr


Magnesium Oxide (Mag-Ox)  400 mg PO BID [facility]


Metoprolol Tartrate (Lopressor)  25 mg PO Q8H [facility]


Pantoprazole Sodium (Protonix Ec Tab)  20 mg PO 0600,1600 [facility]


   Last Admin: 02/09/18 17:39 Dose:  20 mg


Potassium Chloride (K-Dur 20 Meq Er Tab)  20 meq PO BRK [facility]


   Last Admin: 02/09/18 08:40 Dose:  20 meq











Physical Exam





- Head Exam


Head Exam: ATRAUMATIC





- Eye Exam


Eye Exam: Normal appearance





- ENT Exam


ENT Exam: Mucous Membranes Dry





- Respiratory Exam


Respiratory Exam: NORMAL BREATHING PATTERN





- Cardiovascular Exam


Cardiovascular Exam: +S1, +S2





- GI/Abdominal Exam


GI & Abdominal Exam: Normal Bowel Sounds





- Extremities Exam


Extremities exam: Positive for: pedal edema





- Neurological Exam


Neurological exam: Oriented x3





- Psychiatric Exam


Psychiatric exam: Normal Affect, Normal Mood





- Skin


Skin Exam: Warm





Results





- Vital Signs


Recent Vital Signs: 


 Last Vital Signs











Temp  97.3 F L  02/09/18 18:00


 


Pulse  130 H  02/09/18 18:00


 


Resp  19   02/09/18 18:00


 


BP  134/88   02/09/18 18:00


 


Pulse Ox  99   02/09/18 06:00














- Labs


Result Diagrams: 


 02/09/18 05:30





 02/09/18 05:30


Labs: 


 Laboratory Results - last 24 hr











  02/08/18 02/08/18 02/08/18





  19:25 19:25 19:25


 


WBC   


 


RBC   


 


Hgb   


 


Hct   


 


MCV   


 


MCH   


 


MCHC   


 


RDW   


 


Plt Count   


 


MPV   


 


Gran %   


 


Lymph % (Auto)   


 


Mono % (Auto)   


 


Eos % (Auto)   


 


Baso % (Auto)   


 


Gran #   


 


Lymph # (Auto)   


 


Mono # (Auto)   


 


Eos # (Auto)   


 


Baso # (Auto)   


 


pO2  32  


 


VBG pH  7.33  


 


VBG pCO2  51.0  


 


VBG HCO3  26.9  


 


VBG Total CO2  28.5 H  


 


VBG O2 Sat (Calc)  61.1  


 


VBG Base Excess  0.2  


 


VBG Potassium  4.5  


 


Sodium  126.0 L  


 


Chloride  93.0 L  


 


Glucose  108  


 


Lactate  1.7  


 


FiO2  21.0  


 


Potassium   


 


Carbon Dioxide   


 


Anion Gap   


 


BUN   


 


Creatinine   


 


Est GFR ( Amer)   


 


Est GFR (Non-Af Amer)   


 


Random Glucose   


 


Serum Osmolality   


 


Lactic Acid   


 


Uric Acid   7.9 


 


Calcium   


 


Magnesium   1.7 


 


Iron   


 


TIBC   


 


% Saturation   


 


Ferritin   


 


Total Bilirubin   


 


Direct Bilirubin   


 


AST   


 


ALT   


 


Alkaline Phosphatase   


 


Total Creatine Kinase   213 


 


Troponin I   0.03 


 


Total Protein   


 


Albumin   


 


Globulin   


 


Albumin/Globulin Ratio   


 


Prostate Specific Ag   


 


Vitamin B12   


 


Folate   


 


Procalcitonin    0.14 L


 


Free T4   


 


Thyroxine (T4)   


 


Venous Blood Potassium  4.5  














  02/08/18 02/08/18 02/08/18





  19:25 19:25 19:25


 


WBC   


 


RBC   


 


Hgb   


 


Hct   


 


MCV   


 


MCH   


 


MCHC   


 


RDW   


 


Plt Count   


 


MPV   


 


Gran %   


 


Lymph % (Auto)   


 


Mono % (Auto)   


 


Eos % (Auto)   


 


Baso % (Auto)   


 


Gran #   


 


Lymph # (Auto)   


 


Mono # (Auto)   


 


Eos # (Auto)   


 


Baso # (Auto)   


 


pO2   


 


VBG pH   


 


VBG pCO2   


 


VBG HCO3   


 


VBG Total CO2   


 


VBG O2 Sat (Calc)   


 


VBG Base Excess   


 


VBG Potassium   


 


Sodium   


 


Chloride   


 


Glucose   


 


Lactate   


 


FiO2   


 


Potassium   


 


Carbon Dioxide   


 


Anion Gap   


 


BUN   


 


Creatinine   


 


Est GFR ( Amer)   


 


Est GFR (Non-Af Amer)   


 


Random Glucose   


 


Serum Osmolality   


 


Lactic Acid   


 


Uric Acid   


 


Calcium   


 


Magnesium   


 


Iron    51


 


TIBC    340


 


% Saturation    15 L


 


Ferritin  206.0  


 


Total Bilirubin   


 


Direct Bilirubin   


 


AST   


 


ALT   


 


Alkaline Phosphatase   


 


Total Creatine Kinase   


 


Troponin I   


 


Total Protein   


 


Albumin   


 


Globulin   


 


Albumin/Globulin Ratio   


 


Prostate Specific Ag   97.4 H 


 


Vitamin B12  896  


 


Folate  6.7  


 


Procalcitonin   


 


Free T4   1.50 


 


Thyroxine (T4)   5.9 


 


Venous Blood Potassium   














  02/08/18 02/08/18 02/09/18





  21:00 23:15 05:30


 


WBC   


 


RBC   


 


Hgb   


 


Hct   


 


MCV   


 


MCH   


 


MCHC   


 


RDW   


 


Plt Count   


 


MPV   


 


Gran %   


 


Lymph % (Auto)   


 


Mono % (Auto)   


 


Eos % (Auto)   


 


Baso % (Auto)   


 


Gran #   


 


Lymph # (Auto)   


 


Mono # (Auto)   


 


Eos # (Auto)   


 


Baso # (Auto)   


 


pO2   


 


VBG pH   


 


VBG pCO2   


 


VBG HCO3   


 


VBG Total CO2   


 


VBG O2 Sat (Calc)   


 


VBG Base Excess   


 


VBG Potassium   


 


Sodium    129 L


 


Chloride    94 L


 


Glucose   


 


Lactate   


 


FiO2   


 


Potassium    3.8


 


Carbon Dioxide    23


 


Anion Gap    15


 


BUN    13


 


Creatinine    1.2


 


Est GFR ( Amer)    > 60


 


Est GFR (Non-Af Amer)    58


 


Random Glucose    102


 


Serum Osmolality   


 


Lactic Acid  1.7  


 


Uric Acid   


 


Calcium    9.3


 


Magnesium    1.5 L


 


Iron   


 


TIBC   


 


% Saturation   


 


Ferritin   


 


Total Bilirubin    2.2 H


 


Direct Bilirubin    1.3 H


 


AST    40


 


ALT    31


 


Alkaline Phosphatase    88


 


Total Creatine Kinase   229  217


 


Troponin I    0.04  D


 


Total Protein    6.3


 


Albumin    3.2


 


Globulin    3.1


 


Albumin/Globulin Ratio    1.0 L


 


Prostate Specific Ag   


 


Vitamin B12   


 


Folate   


 


Procalcitonin   


 


Free T4   


 


Thyroxine (T4)   


 


Venous Blood Potassium   














  02/09/18 02/09/18 02/09/18





  05:30 05:30 05:30


 


WBC  3.7 L  


 


RBC  4.66  


 


Hgb  13.3 L  


 


Hct  38.8 L  


 


MCV  83.3  


 


MCH  28.5  


 


MCHC  34.3  


 


RDW  15.6 H  


 


Plt Count  164  


 


MPV  10.0  


 


Gran %  62.0  


 


Lymph % (Auto)  21.7 L  


 


Mono % (Auto)  13.9 H  


 


Eos % (Auto)  1.3 L  


 


Baso % (Auto)  1.1  


 


Gran #  2.32  


 


Lymph # (Auto)  0.8 L  


 


Mono # (Auto)  0.5  


 


Eos # (Auto)  0.1  


 


Baso # (Auto)  0.04  


 


pO2   


 


VBG pH   


 


VBG pCO2   


 


VBG HCO3   


 


VBG Total CO2   


 


VBG O2 Sat (Calc)   


 


VBG Base Excess   


 


VBG Potassium   


 


Sodium   


 


Chloride   


 


Glucose   


 


Lactate   


 


FiO2   


 


Potassium   


 


Carbon Dioxide   


 


Anion Gap   


 


BUN   


 


Creatinine   


 


Est GFR ( Amer)   


 


Est GFR (Non-Af Amer)   


 


Random Glucose   


 


Serum Osmolality   


 


Lactic Acid    1.1


 


Uric Acid   


 


Calcium   


 


Magnesium   


 


Iron   


 


TIBC   


 


% Saturation   


 


Ferritin   


 


Total Bilirubin   


 


Direct Bilirubin   


 


AST   


 


ALT   


 


Alkaline Phosphatase   


 


Total Creatine Kinase   


 


Troponin I   


 


Total Protein   


 


Albumin   


 


Globulin   


 


Albumin/Globulin Ratio   


 


Prostate Specific Ag   90.5 H 


 


Vitamin B12   


 


Folate   


 


Procalcitonin   


 


Free T4   


 


Thyroxine (T4)   


 


Venous Blood Potassium   














  02/09/18 02/09/18





  11:30 11:30


 


WBC  


 


RBC  


 


Hgb  


 


Hct  


 


MCV  


 


MCH  


 


MCHC  


 


RDW  


 


Plt Count  


 


MPV  


 


Gran %  


 


Lymph % (Auto)  


 


Mono % (Auto)  


 


Eos % (Auto)  


 


Baso % (Auto)  


 


Gran #  


 


Lymph # (Auto)  


 


Mono # (Auto)  


 


Eos # (Auto)  


 


Baso # (Auto)  


 


pO2  


 


VBG pH  


 


VBG pCO2  


 


VBG HCO3  


 


VBG Total CO2  


 


VBG O2 Sat (Calc)  


 


VBG Base Excess  


 


VBG Potassium  


 


Sodium  


 


Chloride  


 


Glucose  


 


Lactate  


 


FiO2  


 


Potassium  


 


Carbon Dioxide  


 


Anion Gap  


 


BUN  


 


Creatinine  


 


Est GFR ( Amer)  


 


Est GFR (Non-Af Amer)  


 


Random Glucose  


 


Serum Osmolality   281


 


Lactic Acid  


 


Uric Acid  


 


Calcium  


 


Magnesium  


 


Iron  


 


TIBC  


 


% Saturation  


 


Ferritin  


 


Total Bilirubin  


 


Direct Bilirubin  


 


AST  


 


ALT  


 


Alkaline Phosphatase  


 


Total Creatine Kinase  


 


Troponin I  0.03  D 


 


Total Protein  


 


Albumin  


 


Globulin  


 


Albumin/Globulin Ratio  


 


Prostate Specific Ag  


 


Vitamin B12  


 


Folate  


 


Procalcitonin  


 


Free T4  


 


Thyroxine (T4)  


 


Venous Blood Potassium  














Assessment & Plan


(1) Bone lesion


Assessment and Plan: 


suspect metastatic prostate cancer given elevated PSA


urology evaluation


Status: Acute   





(2) Pleural effusion


Assessment and Plan: 


? CHF related ? malignancy related


consider thoracentesis if symptomatic


Status: Acute   





(3) Leukopenia


Assessment and Plan: 


no neutropenia


? related to bone mets


Status: Acute   





(4) Anemia


Assessment and Plan: 


mild


likely chronic disease


Status: Acute   





(5) Coagulopathy


Assessment and Plan: 


likely nutritional





Thank you for this interesting consult.


Status: Acute

## 2018-02-09 NOTE — CARD
--------------- APPROVED REPORT --------------





EXAM: Two-dimensional and M-mode echocardiogram with Doppler and 

color Doppler.



INDICATION

Atrial Fibrillation



2D DIMENSIONS 

Left Atrium (2D)5.4   (1.6-4.0cm)IVSd1.3   (0.7-1.1cm)

LVDd4.8   (3.9-5.9cm)PWd1.2   (0.7-1.1cm)

LVDs4.4   (2.5-4.0cm)FS (%) 9.6   %

LVEF (%)20.9   (>50%)



M-Mode DIMENSIONS 

Aortic Root2.40   (2.2-3.7cm)Aortic Cusp Exc.1.40   (1.5-2.0cm)



Aortic Valve

AoV Peak Reolyzbr109.0cm/Kimo Peak GR.11mmHg



Mitral Valve

E/A ratio0.0



TDI

E/Lateral E'0.0E/Medial E'0.0



Tricuspid Valve

TR Peak [id number]/sRAP [id number] Peak Gr.42mmHg

[id number]



 LEFT VENTRICLE 

The left ventricle is normal size. There is mild concentric left 

ventricular hypertrophy. The systolic function is moderately to 

severely impaired. There is global hypokinesis of the left ventricle. 

No left ventricle thrombus noted on this study.



 RIGHT VENTRICLE 

The right ventricle is normal size. There is normal right ventricular 

wall thickness. RV Systolic function is mildly to moderately reduced.



 ATRIA 

The left atrium is moderately dilated. The right atrium is moderately 

dilated.



 AORTIC VALVE 

The aortic valve is moderately thickened. No aortic regurgitation is 

present. There is no aortic valvular stenosis. 



 MITRAL VALVE 

The mitral valve is moderately thickened. Mitral regurgitation is 

moderate.



 TRICUSPID VALVE 

There is moderate tricuspid regurgitation. There is moderate 

pulmonary hypertension.



 GREAT VESSELS 

The aortic root is normal in size.



 PERICARDIAL EFFUSION 

There is a small circumferential pericardial effusion.



<Conclusion>

The left ventricle is normal size.

There is mild concentric left ventricular hypertrophy.

The systolic function is moderately to severely impaired.

There is global hypokinesis of the left ventricle.

No left ventricle thrombus noted on this study.

Mitral regurgitation is moderate.

There is moderate tricuspid regurgitation.

There is moderate pulmonary hypertension.

## 2018-02-09 NOTE — CARD
--------------- APPROVED REPORT --------------





EKG Measurement

Heart [id number]

[id number]

[id number]

[id number]



<Conclusion>

Atrial fibrillation with rapid ventricular response with premature 

ventricular or aberrantly conducted complexes

Nonspecific ST and T wave abnormality, probably digitalis effect

Abnormal ECG

## 2018-02-09 NOTE — MRI
PROCEDURE:  MRI BRAIN WITH AND WITHOUT CONTRAST



HISTORY:

Evaluation for possible metastasis



COMPARISON:

No prior similar exam available for comparison 



TECHNIQUE:

Multiplanar, multisequence MR images of the brain were obtained with 

and without intravenous contrast enhancement. 



15 cc of Omniscan was injected intravenously. 



FINDINGS:



HEMORRHAGE:

None



DWI:

No evidence of an acute or early subacute infarction.



BRAIN PARENCHYMA:

No mass,mass effect or edema. Mild to moderate volume loss is noted.



ENHANCEMENT:

No abnormal intracranial enhancement.



VENTRICLES:

Unremarkable. No hydrocephalus.



CRANIUM:

Unremarkable.



ORBITS:

Grossly unremarkable.



PARANASAL SINUSES/MASTOIDS:

Clear



VASCULAR SYSTEM:

Skull base flow voids intact.



OTHER FINDINGS:

None .



IMPRESSION:

No evidence of enhancing mass lesion mass effect or midline shift.



Mild-to-moderate volume loss.



No evidence of acute infarction or acute pathology in the brain.
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
## 2018-02-09 NOTE — NM
PROCEDURE:  Whole Body Bone Scan



HISTORY:

ELEVATED PSA  BONE METS







COMPARISON:

None available.



TECHNIQUE:

Following administration of 27.3 miCu of Tc MDP multiplanar whole 

body images were obtained.



FINDINGS:

Evidence for bony metastatic disease: Foci of abnormal increased 

uptake thoracolumbar spine, right iliac bone, posterior right ribs, 

bilateral scapula, proximal femurs bilaterally including lesser 

trochanter on the right and sub trochanteric region on the left. 

Focus of increased uptake in the sternum. 



Degenerative uptake: None.



Physiologic uptake: Normal physiologic activity in the kidneys.



Other findings: None.



IMPRESSION:

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

## 2018-02-10 LAB
ALBUMIN SERPL-MCNC: 3 G/DL (ref 3–4.8)
ALBUMIN/GLOB SERPL: 1 {RATIO} (ref 1.1–1.8)
ALT SERPL-CCNC: 34 U/L (ref 7–56)
AST SERPL-CCNC: 37 U/L (ref 17–59)
BASOPHILS # BLD AUTO: 0.04 K/MM3 (ref 0–2)
BASOPHILS NFR BLD: 0.8 % (ref 0–3)
BILIRUB DIRECT SERPL-MCNC: 0.8 MG/DL (ref 0–0.4)
BUN SERPL-MCNC: 12 MG/DL (ref 7–21)
CALCIUM SERPL-MCNC: 9.1 MG/DL (ref 8.4–10.5)
EOSINOPHIL # BLD: 0.2 10*3/UL (ref 0–0.7)
EOSINOPHIL NFR BLD: 4.6 % (ref 1.5–5)
ERYTHROCYTE [DISTWIDTH] IN BLOOD BY AUTOMATED COUNT: 15.5 % (ref 11.5–14.5)
GFR NON-AFRICAN AMERICAN: > 60
GRANULOCYTES # BLD: 2.83 10*3/UL (ref 1.4–6.5)
GRANULOCYTES NFR BLD: 59 % (ref 50–68)
HDLC SERPL-MCNC: 31 MG/DL (ref 29–60)
HGB BLD-MCNC: 13.2 G/DL (ref 14–18)
LDLC SERPL-MCNC: 46 MG/DL (ref 0–129)
LYMPHOCYTES # BLD: 1 10*3/UL (ref 1.2–3.4)
LYMPHOCYTES NFR BLD AUTO: 19.8 % (ref 22–35)
MAGNESIUM SERPL-MCNC: 1.7 MG/DL (ref 1.7–2.2)
MCH RBC QN AUTO: 28.5 PG (ref 25–35)
MCHC RBC AUTO-ENTMCNC: 34 G/DL (ref 31–37)
MCV RBC AUTO: 83.8 FL (ref 80–105)
MONOCYTES # BLD AUTO: 0.8 10*3/UL (ref 0.1–0.6)
MONOCYTES NFR BLD: 15.8 % (ref 1–6)
PLATELET # BLD: 141 10^3/UL (ref 120–450)
PMV BLD AUTO: 9.6 FL (ref 7–11)
PSA SERPL-MCNC: 103.4 NG/ML
RBC # BLD AUTO: 4.63 10^6/UL (ref 3.5–6.1)
WBC # BLD AUTO: 4.8 10^3/UL (ref 4.5–11)

## 2018-02-10 RX ADMIN — PANTOPRAZOLE SODIUM SCH MG: 20 TABLET, DELAYED RELEASE ORAL at 17:13

## 2018-02-10 RX ADMIN — Medication SCH MG: at 17:14

## 2018-02-10 RX ADMIN — Medication SCH MG: at 09:42

## 2018-02-10 RX ADMIN — ENOXAPARIN SODIUM SCH MG: 100 INJECTION SUBCUTANEOUS at 21:06

## 2018-02-10 RX ADMIN — PANTOPRAZOLE SODIUM SCH MG: 20 TABLET, DELAYED RELEASE ORAL at 05:15

## 2018-02-10 RX ADMIN — ENOXAPARIN SODIUM SCH MG: 100 INJECTION SUBCUTANEOUS at 09:42

## 2018-02-10 RX ADMIN — Medication SCH MG: at 14:06

## 2018-02-10 RX ADMIN — Medication SCH: at 10:05

## 2018-02-10 RX ADMIN — DOBUTAMINE HYDROCHLORIDE PRN MLS/HR: 200 INJECTION INTRAVENOUS at 11:32

## 2018-02-10 RX ADMIN — POTASSIUM CHLORIDE SCH: 20 TABLET, EXTENDED RELEASE ORAL at 09:35

## 2018-02-10 RX ADMIN — POTASSIUM CHLORIDE SCH MEQ: 20 TABLET, EXTENDED RELEASE ORAL at 17:15

## 2018-02-10 NOTE — CARD
--------------- APPROVED REPORT --------------





EKG Measurement

Heart [id number]

[id number]

[id number]

[id number]



<Conclusion>

Atrial fibrillation with rapid ventricular response with premature 

ventricular or aberrantly conducted complexes

Low voltage QRS

T wave abnormality, consider lateral ischemia or digitalis effect

Abnormal ECG

## 2018-02-10 NOTE — CP.PCM.PN
Subjective





- Date & Time of Evaluation


Date of Evaluation: 02/10/18


Time of Evaluation: 19:00





- Subjective


Subjective: 





No complaints





Objective





- Vital Signs/Intake and Output


Vital Signs (last 24 hours): 


 











Temp Pulse Resp BP Pulse Ox


 


 97.1 F L  111 H  19   132/92 H  100 


 


 02/10/18 17:22  02/10/18 17:57  02/10/18 17:22  02/10/18 17:22  02/10/18 05:57








Intake and Output: 


 











 02/10/18 02/11/18





 18:59 06:59


 


Intake Total 250 


 


Balance 250 














- Medications


Medications: 


 Current Medications





Allopurinol (Zyloprim)  300 mg PO DAILY [facility]


   Last Admin: 02/10/18 09:46 Dose:  300 mg


Aspirin (Ecotrin)  81 mg PO DAILY [facility]


   Last Admin: 02/10/18 09:42 Dose:  81 mg


Atorvastatin Calcium (Lipitor)  40 mg PO DIN [facility]


   Last Admin: 02/10/18 17:12 Dose:  40 mg


Docusate Sodium (Colace)  100 mg PO TID [facility]


   Last Admin: 02/10/18 17:15 Dose:  100 mg


Enoxaparin Sodium (Lovenox)  100 mg SC Q12H [facility]


   PRN Reason: Protocol


   Last Admin: 02/10/18 09:42 Dose:  100 mg


Furosemide (Lasix)  40 mg IVP Q12H [facility]


   Last Admin: 02/10/18 09:39 Dose:  40 mg


Dobutamine HCl/Dextrose (Dobutamine/Dextrose 5% 500mg/250ml)  500 mg in 250 mls 

@ 14.696 mls/hr IV .Q17H1M PRN; Protocol; 5 MCG/KG/MIN


   PRN Reason: TITRATE PER PROTOCOL


   Last Admin: 02/10/18 11:32 Dose:  5 mcg/kg/min, 14.696 mls/hr


Magnesium Oxide (Mag-Ox)  400 mg PO TID [facility]


   Last Admin: 02/10/18 17:14 Dose:  400 mg


Metoprolol Tartrate (Lopressor)  50 mg PO BID [facility]


   Last Admin: 02/10/18 17:13 Dose:  50 mg


Pantoprazole Sodium (Protonix Ec Tab)  20 mg PO 0600,1600 [facility]


   Last Admin: 02/10/18 17:13 Dose:  20 mg


Potassium Chloride (K-Dur 20 Meq Er Tab)  20 meq PO BID [facility]


   Last Admin: 02/10/18 17:15 Dose:  20 meq











- Labs


Labs: 


 





 02/10/18 06:45 





 02/10/18 06:45 





 











PT  18.0 SECONDS (9.4-12.5)  H  02/08/18  15:45    


 


INR  1.56  (0.93-1.08)  H  02/08/18  15:45    


 


APTT  33.9 Seconds (25.1-36.5)   02/08/18  15:45    














- Head Exam


Head Exam: ATRAUMATIC





- Eye Exam


Eye Exam: Normal appearance





- ENT Exam


ENT Exam: Mucous Membranes Dry





- Respiratory Exam


Respiratory Exam: NORMAL BREATHING PATTERN





- Cardiovascular Exam


Cardiovascular Exam: +S1, +S2





- GI/Abdominal Exam


GI & Abdominal Exam: Normal Bowel Sounds





- Extremities Exam


Extremities Exam: Pedal Edema





Assessment and Plan


(1) Bone lesion


Assessment & Plan: 


suspect bone mets from prostate cancer


consider IR bone lesion biopsy if patient not to have prostate biopsy by urology


Status: Acute   





(2) Pleural effusion


Assessment & Plan: 


comfortable at rest


consider thoracentesis if patient become symptomatic


Status: Acute   





(3) Leukopenia


Assessment & Plan: 


resolved


may be related to bone mets


Status: Acute   





(4) Anemia


Assessment & Plan: 


chronic disease


no iron/b12/folate deficiency


Status: Acute   





(5) Coagulopathy


Status: Acute

## 2018-02-10 NOTE — PN
DATE:  02/09/2018



LOCATION:  Patient is seen in room 272, bed 2.



SUBJECTIVE:  Patient is lying in the bed.  Overnight nurse's notes were

reviewed.  Patient continued to be on atrial fibrillation.  Patient's heart

rate is around low 100s, blood pressure 131/95 overnight.  Patient

continued on dobutamine drip.  Urology consult was notified to Dr. Gray,

ordered free and total PSA.  Patient's bladder scan was done overnight. 

Patient had more than 600 mL of urine.  Straight cath was done.  Even in

the morning, patient's bladder scan was more than 400.  Fraire catheter was

placed this morning, patient had more than 350 mL of output.  Patient was

seen by Dr. Bae by Urology.  Patient was found to be alert, awake,

oriented x3.  Patient underwent echocardiogram.



PHYSICAL EXAMINATION:

VITAL SIGNS:  Overnight, T-max 97.4.  Telemetry shows atrial fibrillation. 

Heart rate in low 100s and 110s.  Blood pressure is steady with 125/95,

131/95, 138/81, 140/94, 139/72; respirations 18 to 20; O2 sat is 99% to

100%.  Total output is 3150 yesterday.  Today's output is 1350.

HEENT:  Head examination, normocephalic, atraumatic.  HEENT examination

shows pink conjunctivae.  Anicteric sclerae.  No oropharyngeal lesion.  No

neck rigidity.  Positive jugular venous tension.

CHEST:  Kyphosis, decreased breath sound at the bases.  Positive creps,

crackles noted bilaterally.

CARDIOVASCULAR:  Shows S1, S2, irregular rhythm.  Positive systolic murmur

left sternal border, right second intercostal space, left second

intercostal space.

ABDOMEN:  Protuberant, distended, tense, positive bowel sounds.  Unable to

appreciate any hepatosplenomegaly.  No guarding.  No rigidity.  No rebound

tenderness.

GENITALIA:  Male.  Positive scrotal swelling.

EXTREMITIES:  Bilateral lower extremity more than 2 to 3+ pitting edema. 

Positive skin changes of the lower extremity.  SCDs, SILVIANO stockings missing.

MUSCULOSKELETAL:  Shows a body mass index of 30.



DIAGNOSTICS:  On 02/09, WBC 3.7, hemoglobin and hematocrit 13.3 and 38.8,

platelet _____.  Repeat VBG shows a lactate of 1.7.  Repeat lactic acid was

1.7 and 1.1.



Serum osmolality 281.  Sodium 129, gone up from 127; potassium 3.8;

chloride 94; CO2 23; anion gap 15; BUN 13; creatinine 1.2; GFR greater than

60; glucose 102; uric acid 9.3; magnesium is low at 1.5.  Total bili 2.2,

direct bili 1.3.  Iron 51; iron saturation 15; TIBC 314; iron level 51,

which is low normal; ferritin 206.  PSA is 97.4 and 90.5.  Procalcitonin

level is low.  TSH and T4 is _____ and ProBNP is 11,300.  Troponin all four

sets is indeterminate and peak troponin is 0.04.  Trace protein,

microscopic hematuria, bacteriuria noted on the urinalysis.  Patient's CAT

scan of the chest, abdomen, pelvis, MRI of the brain, bone scan, all

reviewed and explained to the patient in layman's language.  MRI of the

brain with moderate volume loss, cerebral cortical atrophy of the brain. 

Bone scan is positive for evidence of bony metastasis noted with abnormal

increased uptake thoracolumbar spine, right iliac bone, posterior right

ribcage, bilateral scapula, proximal femur including lesser trochanter on

the right and subtrochanteric on the left, increased uptake on the sternum

suggestive of osseous metastatic disease within the axial skeleton. 

Patient's CAT scan of the chest, abdomen and pelvis was reviewed also and

explained to the patient.  CAT scan of the chest, abdomen, pelvis shows

centrilobular paraseptal emphysema, atelectasis scarring, pulmonary

nodules, bilateral pleural effusion, mild cardiomegaly, coronary artery

calcification, scattered sclerotic lesions in the bone, degenerative joint

disease of spine, positive anasarca with diffuse stranding within the

subcutaneous tissue and subcentimeter pulmonary nodule noted of 0.3 cm,

adrenal gland hypertrophy noted.  Possible right renal cyst noted. 

Diverticulosis noted.  Several scattered sclerotic lesions noted, probable

early avascular necrosis of the femoral head, degenerative joint disease of

the spine.  Small umbilical hernia and inguinal hernia noted.  Moderate

atherosclerotic disease noted,  Subcentimeter retroperitoneal lymph nodes

noted.



EKG and echocardiogram results were noted.  Ejection fraction 20%.  Right

ventricular systolic pressure 52 mmHg, concentric left ventricular

hypertrophy, moderate to severely impaired left ventricular systolic

function, global left ventricular hypokinesis, no thrombus.  Moderately

reduced right ventricular systolic function, moderately dilated left and

right atrium, moderately thickened aortic valve, moderately thickened

mitral valve with moderate mitral regurgitation and moderate tricuspid

regurgitation with moderate pulmonary arterial hypertension with elevated

right ventricular systolic pressure of 52 noted.  EKG done today shows

atrial fibrillation with rapid ventricle response, ST-T changes.



IMPRESSION AND PLAN:

1.  Hypertension.

2.  Atrial fibrillation with rapid ventricular response.

3.  Acute systolic and diastolic congestive heart failure with decreased

left ventricular ejection fraction of 20% and abnormal weight gain and

bilateral lower extremity lymphedema.

4. Leukopenia.

5.  Transient lactic acidosis.

6.  Hyponatremia, possibly syndrome of inappropriate antidiuretic hormone

secretion.

7.  Hypomagnesemia.

8.  Hyperbilirubinemia.

9.  Elevated prostate-specific antigen of greater than 90.

10.  Gait dysfunction.

11.  Deconditioning.

12.  Proteinuria, hematuria, bacteriuria.

13.  Axial skeleton diffuse osseous metastatic disease with abnormal

increased uptake of the thoracolumbar spine, right iliac bone, posterior

rib in the right rib, bilateral scapula, proximal femur bilaterally

including the lesser trochanter on the right and subtrochanteric region on

the left with increased uptake in the sternum.

14.  Cerebral cortical atrophy of the brain with moderate volume loss.

15.  Centrilobular and paraseptal emphysema with atelectasis scarring.

16.  Subcentimeter pulmonary nodules of 0.3 cm.

17.  Bilateral pleural effusion.

18.  Cardiomegaly.

19.  Scattered sclerotic bony lesion.

20.  Moderate diffuse stranding of the subcutaneous tissue and anasarca.

21.  Bilateral adrenal gland hypertrophy.

22.  Probable right renal cyst.

23.  Colonic diverticulosis.

24.  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

25.  Degenerative joint disease of the spine.

26.  Small fat-containing umbilical hernia.

27.  Small fat-containing inguinal hernia.

28.  Subcentimeter retroperitoneal lymphadenopathy.

29.  Dilated cardiomyopathy with left ventricular ejection fraction of 21%.

30.  Concentric left ventricular hypertrophy.

31.  Moderate-to-severely impaired left ventricular systolic function with

left ventricular ejection fraction of 21% and left ventricular global

hypokinesis.

32.  Moderately reduced right ventricular systolic function.

33.  Moderately dilated left and right atrium.

34.  Moderately thickened aortic valve.

35.  Moderately thickened mitral valve.

36.  Moderate mitral regurgitation.

37.  Moderate tricuspid regurgitation with moderate pulmonary arterial

hypertension with right ventricular systolic pressure of 52 mmHg.

38.  History of poor compliance and noncompliance.

39.  Hypervolemic hyponatremia secondary to systolic congestive heart

failure.

40.  Increased urinary retention.

41.  Possible and probable stage IV prostate carcinoma with bone metastasis

and extremely high PSA of greater than 90.

1.  Atrial fibrillation with rapid ventricular response.

2.  Noel-inferolateral coronary ischemia.

3.  History of hypertension.

4.  Most possible and most likely systolic congestive heart failure with

elevated BNP.

5.  Cardiomegaly and pulmonary vascular congestion, suggestive of

congestive heart failure.

6.  Leukopenia.

7.  Lactic acidosis.

8.  Hyponatremia, etiology undetermined.

9.  Questionable syndrome of inappropriate (excretion) of antidiuretic

hormone.

10.  Indeterminate troponin.

11.  Elevated BNP suggestive of congestive heart failure.

12.  Trace proteinuria, trace microscopic hematuria, trace bacteriuria.

13.  Bilateral lower extremity lymphedema.

14.  Anasarca.

15.  Extremely poor compliance and noncompliance.

16.  History of hypovitaminosis D.

17.  History of hyperuricemia, hypomagnesemia and history of elevated

prostate-specific antigen.



At this time, I have seen the patient, examined the patient in room 272,

bed 2.  I have explained to the patient that patient most probably has

prostate cancer.  I have used the word cancer in explaining to the patient

that he has advanced, stage IV, widely metastatic prostate cancer to the

bones and I have explained to the patient that the patient needs Urology,

Hematology/Oncology Cardiology, Nephrology evaluation.  Patient will be

continued on telemetry until patient's atrial fibrillation and congestive

heart failure is improved.  Patient has been ordered serial labs, CMP, LFT,

magnesium, lipid panel ordered.  HIV results pending.  CBC repeat ordered.



CONSULTATION:

1.  Cardiology.

2.  Nephrology.

3.  Urology.

4.  Hematology/Oncology.



Flow cytometry is pending.



CURRENT MEDICATIONS:

1.  Colace 100 mg three times a day.

2.  Dobutamine drip at 5 mcg/kg per minute.

3.  Ecotrin 81 mg daily.

4.  Patient has been ordered K-Dur 20 mEq daily.

5.  Lasix 40 mg IV q. 12.

6.  Lipitor 40 mg daily.

7.  Lopressor 25 mg q. 8 increased to 25 mg q. 8.

8.  Lovenox 100 mg subcu q. 12.

9.  Magnesium oxide 400 mg twice a day.

10.  Protonix 40 mg daily.

11.  Allopurinol 300 mg daily.



Patient is ordered oxygen 2 liters continuous.  Repeat EKG ordered.  Heart

healthy diet ordered.  Out of bed to chair ordered.  SILVIANO stockings, SCDs

ordered.  Occupational therapy, physical therapy ordered.  Fraire catheter

ordered.



Dictated and electronically signed, not read.





__________________________________________

Dale Márquez MD



DD:  02/09/2018 20:13:25

DT:  02/09/2018 20:20:03

Job # [id number]





MTDD

## 2018-02-10 NOTE — PN
DATE:



SUBJECTIVE:  The patient's shortness of breath has improved slightly.  No

retrosternal chest pain.



PHYSICAL EXAMINATION:

VITAL SIGNS:  Blood pressure 110/82, heart rate 62, temperature 97.8,

respirations 19.

HEENT:  Normocephalic.

CHEST:  Absent breath sounds over the bases.

HEART:  S1 and S2, regular.

EXTREMITIES:  A 1+ pitting edema, slight improvement compared to yesterday.



LABORATORY DATA:  Hemoglobin and hematocrit 13.1 and 38.8, white count and

platelet count are within normal limits.  SMA-7:  Sodium 132, potassium

3.5, chloride 93, CO2 is 26, glucose 76, BUN 12, and creatinine 1.1. 

Echocardiographic study revealed mild concentric LVH with severely

depressed ejection fraction and moderate pulmonary hypertension.



ASSESSMENT:

1.  Acute systolic heart failure.

2.  Paroxysmal atrial fibrillation.

3.  Rule out metastatic bone disease.

4.  Hypokalemia and hypomagnesemia.



RECOMMENDATIONS:  Continue current Dobutrex infusion.  Continue aspirin 81

mg once a day.  K-Dur was increased to 20 mEq twice a day.  Continue Lasix

40 mg intravenously twice a day, Lopressor 50 mg twice a day, therapeutic

subcutaneous Lovenox, Zyloprim 300 mg once a day.  Consider initiating

Coumadin therapy if the patient is committed to an outpatient followup with

the primary physician.





__________________________________________

Henry Edge MD



DD:  02/10/2018 13:31:30

DT:  02/10/2018 19:05:17

Job # [id number]

## 2018-02-10 NOTE — CON
DATE:



CARDIOLOGY CONSULTATION



REASON FOR CONSULTATION:  Congestive heart failure as well as paroxysmal

atrial fibrillation.



HISTORY OF PRESENT ILLNESS:  The patient is a 77-year-old 

male who presented because of worsening of leg swelling and pain as well as

weakness and shortness of breath.  The patient has not seen a physician for

a long period of time.  The patient was found to be on rapid atrial

fibrillation and was started on IV Cardizem bolus and then infusion. 

Subsequently IV Cardizem was discontinued.  On the monitor, the patient was

in paroxysmal atrial fibrillation and the last time when I saw him around

01:00 p.m. he was in AFib with controlled heart rate.  The patient denies

any retrosternal chest pain and he is unaware of any history of heart

attack in the past.



SOCIAL HISTORY:  Patient is a former smoker and former ETOH abuser.



MEDICATIONS:  Aspirin 81 mg once a day, K-Dur 20 mEq once a day, Lasix 40

mg intravenously twice a day, Lipitor 40 mg once a day, Lopressor 25 mg

twice a day, Lovenox 100 mg subcutaneous twice a day, Zyloprim 300 mg

daily, Protonix 20 mg twice a day.



REVIEW OF SYSTEMS:  No recent fall.  No fever or chills.  No vomiting or

diarrhea.



PHYSICAL EXAMINATION:

GENERAL:  Patient is an elderly male, who does not appear to be in acute

distress.

VITAL SIGNS:  Blood pressure 125/85, heart rate 109, temperature 97.3,

respirations 20.

HEENT:  Normocephalic.

CHEST:  Minimal rhonchi.

HEART:  S1, S2 regular.

ABDOMEN:  Soft.

EXTREMITIES:  2+ pitting edema.



DIAGNOSTIC DATA:  Chest x-ray revealed significant cardiomegaly, no

infiltrate or effusion.  EKG revealed rapid atrial fibrillation, heart rate

119 with nonspecific T-wave changes.  Brain MRI revealed no evidence of

enhancing mass, lesion, mass effect or midline shift, mild-to-moderate

volume loss.  Venous Doppler of lower extremity, no DVT in the visualized

segments.  Chest, abdomen and pelvis CT scan; moderate anasarca, small

ascites, sclerotic bone lesions compatible with metastasis.



LABORATORY DATA:  Hemoglobin and hematocrit of 13.3, and 38.8, white count

3.7, platelet count 164,000.  Estimated serum sodium 129, potassium 3.8,

chloride 94, CO2 of 21, glucose 102, BUN 13, creatinine 1.2.  Troponins

0.04 and 0.03.  Urine drug screen is negative.  INR is 1.56.



ASSESSMENT:

1.  Congestive heart failure.

2.  Paroxysmal atrial fibrillation.

3.  Rule out metastatic bone lesions.

4.  Hypomagnesemia.



RECOMMENDATIONS:  Continue current; aspirin 81 mg once a day, K-Dur 20 mEq

once a day, Lasix 40 mg intravenously twice a day, Lipitor 40 mg once a

day, magnesium oxide 400 mg twice a day, therapeutic subcutaneous Lovenox

100 mg twice a day.  Obtain PSA level, and I would review the

echocardiographic study performed today.





__________________________________________

Henry Edge MD



DD:  02/09/2018 14:01:10

DT:  02/09/2018 21:00:58

Job # [id number]

## 2018-02-10 NOTE — PN
DATE:  02/10/2018



SUBJECTIVE:  The patient is seen lying in the bed in room 272, bed 2.  The

patient does report increased diuresis.  The patient states that his leg

swelling has gone down, but the foot swelling is still persistent. 

Overnight, the patient slept well.



PHYSICAL EXAMINATION:

VITAL SIGNS:  T-max is 97.6.  Telemetry shows atrial fibrillation, heart

rate came down from 130S yesterday to 110, 101, 114.  Blood pressure in the

last 24 hours, 134/88, 115/83, 133/97, 117/81.  Respiration 20, O2 sat

100%.  Yesterday, output was 3150 plus 1350.  Today's output documented is

3200.  The patient's weight has not been done in the last 2 days.

GENERAL:  The patient is seen lying in the bed.

HEENT:  Head examination is normocephalic, atraumatic.  HEENT examination

shows pink conjunctiva.  Anicteric sclerae.  No oropharyngeal lesion. 

Positive jugular venous distention.

CHEST:  Kyphosis.

LUNGS:  Shows decreased breath sounds bilaterally.  Positive creps,

crackles.

CARDIOVASCULAR:  S1, S2.  Irregular rhythm.  Positive systolic murmur,

right second intercostal space, left second intercostal space, left sternal

border.

ABDOMEN:  Soft, slightly protuberant.  Positive bowel sounds.  Unable to

appreciate any hepatosplenomegaly.  No guarding.  No rigidity.  No rebound

tenderness.

GENITALIA:  Male.

RECTAL:  Deferred.

EXTREMITY:  Shows decreasing 2 to 3+ pitting edema of the legs, but

positive pitting edema of the feet is persistent.

MUSCULOSKELETAL:  Shows a body mass index of 30.1.

NEUROLOGIC:  The patient is alert, awake, oriented x3.  Cranial nerves II

through XII grossly limited.  Gait examination could not be tested.

VASCULAR:  Unable to be palpated because of the pitting edema and

lymphedema of the lower extremity.  Body mass index is 30.



DIAGNOSTICS:  On 02/10/2018, WBC 4.8, hemoglobin and hematocrit 13.2 and

38.8, platelets 141.  Sodium 132, potassium 3.5, chloride 93, CO2 of 26,

anion gap 16, BUN 12, creatinine 1.1, GFR greater than 60, glucose 76,

calcium 9.1, magnesium 1.7, total bili 1.8, direct bili 0.8.  PSA was done

three consecutive days, PSA is 97, 90.5, 85, persistently elevated.



EKG from today shows atrial fibrillation with rapid ventricular response,

ST-T wave changes, PVCs.



IMPRESSION AND PLAN:

1.  Atrial fibrillation with rapid ventricular response.

2.  Dilated cardiomyopathy with ejection fraction of 21%.

3.  Concentric left ventricular hypertrophy.

4.  Moderate to severely impaired left ventricular function with left

ventricle ejection fraction of 21%.

5.  Global left ventricular hypokinesis.

6.  Moderately reduced right ventricular systolic function.

7.  Moderately dilated left and right atrium.

8.  Moderately thickened aortic valve.

9.  Moderately thickened mitral valve with moderate mitral regurgitation.

10.  Moderate tricuspid regurgitation with moderate pulmonary arterial

hypertension with right ventricular systolic pressure of 52 mmHg.

11.  Atrial fibrillation with rapid ventricular response.

12.  Gait dysfunction.

13.  Bilateral lower extremity lymphedema and venous stasis.

14.  Leukopenia.

15.  Anemia.

16.  Lactic acidosis.

17.  Hypokalemia.

18.  Hyperbilirubinemia.

19.  Elevated prostate-specific antigen.

20.  Hyperbilirubinemia.

21.  Hyponatremia.

22.  Possible syndrome of inappropriate antidiuretic hormone.

23.  Systolic congestive heart failure with elevated BNP.

24.  Systolic and diastolic congestive heart failure with elevated BNP,

decreased left ventricular ejection fraction of 21% and moderate pulmonary

arterial hypertension, moderate tricuspid regurgitation.

1.  Hypertension.

2.  Atrial fibrillation with rapid ventricular response.

3.  Acute systolic and diastolic congestive heart failure with decreased

left ventricular ejection fraction of 20% and abnormal weight gain and

bilateral lower extremity lymphedema.

4. Leukopenia.

5.  Transient lactic acidosis.

6.  Hyponatremia, possibly syndrome of inappropriate antidiuretic hormone

secretion.

7.  Hypomagnesemia.

8.  Hyperbilirubinemia.

9.  Elevated prostate-specific antigen of greater than 90.

10.  Gait dysfunction.

11.  Deconditioning.

12.  Proteinuria, hematuria, bacteriuria.

13.  Axial skeleton diffuse osseous metastatic disease with abnormal

increased uptake of the thoracolumbar spine, right iliac bone, posterior

rib in the right rib, bilateral scapula, proximal femur bilaterally

including the lesser trochanter on the right and subtrochanteric region on

the left with increased uptake in the sternum.

14.  Cerebral cortical atrophy of the brain with moderate volume loss.

15.  Centrilobular and paraseptal emphysema with atelectasis scarring.

16.  Subcentimeter pulmonary nodules of 0.3 cm.

17.  Bilateral pleural effusion.

18.  Cardiomegaly.

19.  Scattered sclerotic bony lesion.

20.  Moderate diffuse stranding of the subcutaneous tissue and anasarca.

21.  Bilateral adrenal gland hypertrophy.

22.  Probable right renal cyst.

23.  Colonic diverticulosis.

24.  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

25.  Degenerative joint disease of the spine.

26.  Small fat-containing umbilical hernia.

27.  Small fat-containing inguinal hernia.

28.  Subcentimeter retroperitoneal lymphadenopathy.

29.  Dilated cardiomyopathy with left ventricular ejection fraction of 21%.

30.  Concentric left ventricular hypertrophy.

31.  Moderate-to-severely impaired left ventricular systolic function with

left ventricular ejection fraction of 21% and left ventricular global

hypokinesis.

32.  Moderately reduced right ventricular systolic function.

33.  Moderately dilated left and right atrium.

34.  Moderately thickened aortic valve.

35.  Moderately thickened mitral valve.

36.  Moderate mitral regurgitation.

37.  Moderate tricuspid regurgitation with moderate pulmonary arterial

hypertension with right ventricular systolic pressure of 52 mmHg.

38.  History of poor compliance and noncompliance.

39.  Hypervolemic hyponatremia secondary to systolic congestive heart

failure.

40.  Increased urinary retention.

41.  Possible and probable stage IV prostate carcinoma with bone metastasis

and extremely high PSA of greater than 90.

1.  Atrial fibrillation with rapid ventricular response.

2.  Noel-inferolateral coronary ischemia.

3.  History of hypertension.

4.  Most possible and most likely systolic congestive heart failure with

elevated BNP.

5.  Cardiomegaly and pulmonary vascular congestion, suggestive of

congestive heart failure.

6.  Leukopenia.

7.  Lactic acidosis.

8.  Hyponatremia, etiology undetermined.

9.  Questionable syndrome of inappropriate (excretion) of antidiuretic

hormone.

10.  Indeterminate troponin.

11.  Elevated BNP suggestive of congestive heart failure.

12.  Trace proteinuria, trace microscopic hematuria, trace bacteriuria.

13.  Bilateral lower extremity lymphedema.

14.  Anasarca.

15.  Extremely poor compliance and noncompliance.

16.  History of hypovitaminosis D.

17.  History of hyperuricemia, hypomagnesemia and history of elevated

prostate-specific antigen.



PLAN:  At this time, the patient has been ordered serial CMP, LFTs, repeat

labs.  The patient's current consultation;

1.  Cardiology.

2.  Nephrology.

3.  Urology.

4.  Hematology/Oncology.



The patient is referred to Transitional Care Unit.  Flow cytometry ordered.

Current medications:  Colace 100 mg three times a day, dobutamine drip 5

mcg/kg/minute, Ecotrin 81 mg daily.  The patient is given potassium

supplementation.  The patient's potassium supplementation has been

increased from 20 once a day to 20 twice a day.  The patient is given a

stat dose of 40 mEq of K-Dur.  The patient is on Lasix 40 IV q. 12, Lipitor

40 mg daily, Lopressor 25 mg q. 8, Lovenox 100 mg subcu q. 12, magnesium

oxide 400 mg three times a day, Protonix 40 mg daily, allopurinol 300 mg

daily.  The patient's metoprolol, Lopressor will be increased to 50 mg

twice a day for atrial fibrillation and rate control.  The patient has been

again updated about his diagnosis, treatment recommendations. 

Recommendations of all the physicians involved in the care of the patient

was explained to the patient.  We are still awaiting Urology evaluation and

recommendation since February 8th.  The patient is seen by Cardiology,

Nephrology and Hematology/Oncology.



Dictated and electronically signed, not read.



__________________________________________

Dale Márquez MD





DD:  02/10/2018 9:25:32

DT:  02/10/2018 9:29:43

Job # [id number]



MTDD

## 2018-02-10 NOTE — CP.PCM.PN
Subjective





- Date & Time of Evaluation


Date of Evaluation: 02/10/18


Time of Evaluation: 14:32





- Subjective


Subjective: 








RENAL FOLLOW UP








S: seen and examined feels ok no complaints, kim in place





pe:


vs as below


gen: nad


sclera: anicteric


op: clear


neck: supple


cv: +s1+s2


lungs: dec bs at bases


abd: soft


ext: 1+ edema


neuro: a+ox3


psych: nml affect


skin: no rash








labs and imaging reviewed





imp:


Hyponatremia/ Acute on Chronic systolic heart failure/  Anemia / BPH/ 

Obstructive uropathy / hypomag








plan:


Na slowly improving, can continue gentle diuresis 


f/u gu eval


on oral mag


k repleted 


hgb stable


f/u cardiology





Objective





- Vital Signs/Intake and Output


Vital Signs (last 24 hours): 


 











Temp Pulse Resp BP Pulse Ox


 


 97.8 F   52 L  19   110/82   100 


 


 02/10/18 12:00  02/10/18 12:00  02/10/18 12:00  02/10/18 12:00  02/10/18 05:57








Intake and Output: 


 











 02/10/18 02/10/18





 06:59 18:59


 


Intake Total 533 250


 


Output Total 3200 


 


Balance -2667 250














- Medications


Medications: 


 Current Medications





Allopurinol (Zyloprim)  300 mg PO DAILY [facility]


   Last Admin: 02/10/18 09:46 Dose:  300 mg


Aspirin (Ecotrin)  81 mg PO DAILY [facility]


   Last Admin: 02/10/18 09:42 Dose:  81 mg


Atorvastatin Calcium (Lipitor)  40 mg PO DIN [facility]


   Last Admin: 02/09/18 17:35 Dose:  40 mg


Docusate Sodium (Colace)  100 mg PO TID [facility]


   Last Admin: 02/10/18 14:06 Dose:  100 mg


Enoxaparin Sodium (Lovenox)  100 mg SC Q12H [facility]


   PRN Reason: Protocol


   Last Admin: 02/10/18 09:42 Dose:  100 mg


Furosemide (Lasix)  40 mg IVP Q12H [facility]


   Last Admin: 02/10/18 09:39 Dose:  40 mg


Dobutamine HCl/Dextrose (Dobutamine/Dextrose 5% 500mg/250ml)  500 mg in 250 mls 

@ 14.696 mls/hr IV .Q17H1M PRN; Protocol; 5 MCG/KG/MIN


   PRN Reason: TITRATE PER PROTOCOL


   Last Admin: 02/10/18 11:32 Dose:  5 mcg/kg/min, 14.696 mls/hr


Magnesium Oxide (Mag-Ox)  400 mg PO TID [facility]


   Last Admin: 02/10/18 14:06 Dose:  400 mg


Metoprolol Tartrate (Lopressor)  50 mg PO BID [facility]


   Last Admin: 02/10/18 09:42 Dose:  50 mg


Pantoprazole Sodium (Protonix Ec Tab)  20 mg PO 0600,1600 [facility]


   Last Admin: 02/10/18 05:15 Dose:  20 mg


Potassium Chloride (K-Dur 20 Meq Er Tab)  20 meq PO BID [facility]











- Labs


Labs: 


 





 02/10/18 06:45 





 02/10/18 06:45 





 











PT  18.0 SECONDS (9.4-12.5)  H  02/08/18  15:45    


 


INR  1.56  (0.93-1.08)  H  02/08/18  15:45    


 


APTT  33.9 Seconds (25.1-36.5)   02/08/18  15:45

## 2018-02-11 LAB
ALBUMIN SERPL-MCNC: 3.2 G/DL (ref 3–4.8)
ALBUMIN/GLOB SERPL: 1.1 {RATIO} (ref 1.1–1.8)
ALT SERPL-CCNC: 33 U/L (ref 7–56)
AST SERPL-CCNC: 39 U/L (ref 17–59)
BASOPHILS # BLD AUTO: 0.04 K/MM3 (ref 0–2)
BASOPHILS NFR BLD: 1 % (ref 0–3)
BILIRUB DIRECT SERPL-MCNC: 0.9 MG/DL (ref 0–0.4)
BUN SERPL-MCNC: 13 MG/DL (ref 7–21)
CALCIUM SERPL-MCNC: 9.2 MG/DL (ref 8.4–10.5)
EOSINOPHIL # BLD: 0.2 10*3/UL (ref 0–0.7)
EOSINOPHIL NFR BLD: 6.1 % (ref 1.5–5)
ERYTHROCYTE [DISTWIDTH] IN BLOOD BY AUTOMATED COUNT: 15.5 % (ref 11.5–14.5)
GFR NON-AFRICAN AMERICAN: 58
GRANULOCYTES # BLD: 1.96 10*3/UL (ref 1.4–6.5)
GRANULOCYTES NFR BLD: 49.9 % (ref 50–68)
HGB BLD-MCNC: 13 G/DL (ref 14–18)
LYMPHOCYTES # BLD: 1 10*3/UL (ref 1.2–3.4)
LYMPHOCYTES NFR BLD AUTO: 25.7 % (ref 22–35)
MAGNESIUM SERPL-MCNC: 1.7 MG/DL (ref 1.7–2.2)
MCH RBC QN AUTO: 28.3 PG (ref 25–35)
MCHC RBC AUTO-ENTMCNC: 33.4 G/DL (ref 31–37)
MCV RBC AUTO: 84.7 FL (ref 80–105)
MONOCYTES # BLD AUTO: 0.7 10*3/UL (ref 0.1–0.6)
MONOCYTES NFR BLD: 17.3 % (ref 1–6)
PLATELET # BLD: 129 10^3/UL (ref 120–450)
PMV BLD AUTO: 9.6 FL (ref 7–11)
RBC # BLD AUTO: 4.59 10^6/UL (ref 3.5–6.1)
WBC # BLD AUTO: 3.9 10^3/UL (ref 4.5–11)

## 2018-02-11 RX ADMIN — ENOXAPARIN SODIUM SCH MG: 100 INJECTION SUBCUTANEOUS at 08:45

## 2018-02-11 RX ADMIN — Medication SCH MG: at 17:42

## 2018-02-11 RX ADMIN — POTASSIUM CHLORIDE SCH MEQ: 20 TABLET, EXTENDED RELEASE ORAL at 17:41

## 2018-02-11 RX ADMIN — DOBUTAMINE HYDROCHLORIDE PRN MLS/HR: 200 INJECTION INTRAVENOUS at 06:12

## 2018-02-11 RX ADMIN — POTASSIUM CHLORIDE SCH MEQ: 20 TABLET, EXTENDED RELEASE ORAL at 11:15

## 2018-02-11 RX ADMIN — Medication SCH MG: at 11:13

## 2018-02-11 RX ADMIN — PANTOPRAZOLE SODIUM SCH MG: 20 TABLET, DELAYED RELEASE ORAL at 17:41

## 2018-02-11 RX ADMIN — Medication SCH MG: at 14:36

## 2018-02-11 RX ADMIN — PANTOPRAZOLE SODIUM SCH MG: 20 TABLET, DELAYED RELEASE ORAL at 06:11

## 2018-02-11 NOTE — CARD
--------------- APPROVED REPORT --------------





EKG Measurement

Heart [id number]

[id number]

[id number]

[id number]



<Conclusion>

Atrial fibrillation with rapid ventricular response with premature 

ventricular or aberrantly conducted complexes

T wave abnormality, consider lateral ischemia or digitalis effect

Abnormal ECG

## 2018-02-11 NOTE — PCM.URO
Urology Progress Note





- Objective


Lab Studies: Reviewed (no change from 2/9 see dictated note possible cysto 2/12 

depending on medical status)


Lab Results Last 24 Hours: 


 Laboratory Results - last 24 hr











  02/09/18 02/09/18 02/10/18





  05:30 22:25 06:45


 


WBC   


 


RBC   


 


Hgb   


 


Hct   


 


MCV   


 


MCH   


 


MCHC   


 


RDW   


 


Plt Count   


 


MPV   


 


Gran %   


 


Lymph % (Auto)   


 


Mono % (Auto)   


 


Eos % (Auto)   


 


Baso % (Auto)   


 


Gran #   


 


Lymph # (Auto)   


 


Mono # (Auto)   


 


Eos # (Auto)   


 


Baso # (Auto)   


 


Sodium   


 


Potassium   


 


Chloride   


 


Carbon Dioxide   


 


Anion Gap   


 


BUN   


 


Creatinine   


 


Est GFR ( Amer)   


 


Est GFR (Non-Af Amer)   


 


Random Glucose   


 


Calcium   


 


Magnesium   


 


Total Bilirubin   


 


Direct Bilirubin   


 


AST   


 


ALT   


 


Alkaline Phosphatase   


 


Total Protein   


 


Albumin   


 


Globulin   


 


Albumin/Globulin Ratio   


 


Triglycerides   


 


Cholesterol   


 


LDL Cholesterol Direct   


 


HDL Cholesterol   


 


Prostate Specific Ag    85.1 H


 


Free PSA  5.2  


 


% Free PSA  Not calculated  


 


Total PSA  103.4 H  


 


Urine Osmolality   190 L 


 


WB Flow Cytometry   














  02/10/18 02/10/18 02/10/18





  06:45 06:45 07:03


 


WBC  4.8  D  


 


RBC  4.63  


 


Hgb  13.2 L  


 


Hct  38.8 L  


 


MCV  83.8  


 


MCH  28.5  


 


MCHC  34.0  


 


RDW  15.5 H  


 


Plt Count  141  


 


MPV  9.6  


 


Gran %  59.0  


 


Lymph % (Auto)  19.8 L  


 


Mono % (Auto)  15.8 H  


 


Eos % (Auto)  4.6  


 


Baso % (Auto)  0.8  


 


Gran #  2.83  


 


Lymph # (Auto)  1.0 L  


 


Mono # (Auto)  0.8 H  


 


Eos # (Auto)  0.2  


 


Baso # (Auto)  0.04  


 


Sodium   132 


 


Potassium   3.5 L 


 


Chloride   93 L 


 


Carbon Dioxide   26 


 


Anion Gap   16 


 


BUN   12 


 


Creatinine   1.1 


 


Est GFR ( Amer)   > 60 


 


Est GFR (Non-Af Amer)   > 60 


 


Random Glucose   76 


 


Calcium   9.1 


 


Magnesium   1.7 


 


Total Bilirubin   1.8 H 


 


Direct Bilirubin   0.8 H 


 


AST   37 


 


ALT   34 


 


Alkaline Phosphatase   95 


 


Total Protein   6.0 


 


Albumin   3.0 


 


Globulin   3.0 


 


Albumin/Globulin Ratio   1.0 L 


 


Triglycerides   38 


 


Cholesterol   86 L 


 


LDL Cholesterol Direct   46 


 


HDL Cholesterol   31 


 


Prostate Specific Ag   


 


Free PSA   


 


% Free PSA   


 


Total PSA   


 


Urine Osmolality   


 


WB Flow Cytometry    Reference test











Intake & Output: 


 Intake & Output











 02/10/18 02/10/18 02/11/18





 06:59 18:59 06:59


 


Intake Total 533 250 550


 


Output Total 3200  3000


 


Balance -2667 250 -2450


 


Weight  201 lb 4.8 oz 


 


Intake:   


 


   250 250


 


    Left Wrist 353  


 


  Oral 180  300


 


Output:   


 


  Urine 3200  3000


 


    Urethral (Fraire)   3000


 


    Urine, Voided 3200  


 


Other:   


 


  # Bowel Movements 0  0











Vital Signs: 


 Vital Signs - 24 hr











  02/10/18 02/10/18 02/10/18





  09:39 09:42 10:00


 


Temperature   


 


Pulse Rate  118 H 122 H


 


Respiratory   





Rate   


 


Blood Pressure 118/70  


 


O2 Sat by Pulse   





Oximetry   














  02/10/18 02/10/18 02/10/18





  11:32 12:00 17:13


 


Temperature  97.8 F 


 


Pulse Rate 90 52 L 114 H


 


Respiratory  19 





Rate   


 


Blood Pressure 116/60 110/82 132/92 H


 


O2 Sat by Pulse   





Oximetry   














  02/10/18 02/10/18 02/10/18





  17:22 17:57 21:06


 


Temperature 97.1 F L  


 


Pulse Rate 94 H 111 H 


 


Respiratory 19  





Rate   


 


Blood Pressure 132/92 H  125/83


 


O2 Sat by Pulse   





Oximetry   














  02/10/18 02/10/18 02/11/18





  22:00 23:06 00:00


 


Temperature  97.4 F L 97.4 F L


 


Pulse Rate 110 H 94 H 94 H


 


Respiratory  19 19





Rate   


 


Blood Pressure  100/77 100/77


 


O2 Sat by Pulse   97





Oximetry   














  02/11/18 02/11/18 02/11/18





  02:00 05:59 06:12


 


Temperature  97.4 F L 


 


Pulse Rate 107 H 76 98 H


 


Respiratory  20 





Rate   


 


Blood Pressure  111/81 119/80


 


O2 Sat by Pulse  100 





Oximetry

## 2018-02-11 NOTE — CP.PCM.PN
Subjective





- Date & Time of Evaluation


Date of Evaluation: 02/11/18


Time of Evaluation: 17:15





- Subjective


Subjective: 





Leg swelling improved.


Discussed the role of biopsy in further w/u of likely malignancy with the 

patient and his son.  The patient indicated he wants to think about it further.





Objective





- Vital Signs/Intake and Output


Vital Signs (last 24 hours): 


 











Temp Pulse Resp BP Pulse Ox


 


 97.6 F   117 H  19   119/79   100 


 


 02/11/18 17:41  02/11/18 18:00  02/11/18 17:41  02/11/18 17:41  02/11/18 05:59











- Medications


Medications: 


 Current Medications





Allopurinol (Zyloprim)  300 mg PO DAILY [facility]


   Last Admin: 02/11/18 11:14 Dose:  300 mg


Aspirin (Ecotrin)  81 mg PO DAILY [facility]


   Last Admin: 02/11/18 11:13 Dose:  81 mg


Atorvastatin Calcium (Lipitor)  40 mg PO DIN [facility]


   Last Admin: 02/11/18 17:41 Dose:  40 mg


Docusate Sodium (Colace)  100 mg PO TID [facility]


   Last Admin: 02/11/18 17:41 Dose:  100 mg


Enoxaparin Sodium (Lovenox)  40 mg SC DAILY [facility]


   PRN Reason: Protocol


Furosemide (Lasix)  40 mg IVP DAILY [facility]


Dobutamine HCl/Dextrose (Dobutamine/Dextrose 5% 500mg/250ml)  500 mg in 250 mls 

@ 14.696 mls/hr IV .Q17H1M PRN; Protocol; 5 MCG/KG/MIN


   PRN Reason: TITRATE PER PROTOCOL


   Last Admin: 02/11/18 06:12 Dose:  5 mcg/kg/min, 14.696 mls/hr


Magnesium Oxide (Mag-Ox)  400 mg PO TID [facility]


   Last Admin: 02/11/18 17:42 Dose:  400 mg


Metoprolol Tartrate (Lopressor)  50 mg PO BID [facility]


   Last Admin: 02/11/18 17:41 Dose:  50 mg


Pantoprazole Sodium (Protonix Ec Tab)  20 mg PO 0600,1600 [facility]


   Last Admin: 02/11/18 17:41 Dose:  20 mg


Potassium Chloride (K-Dur 20 Meq Er Tab)  20 meq PO BID [facility]


   Last Admin: 02/11/18 17:41 Dose:  20 meq











- Labs


Labs: 


 





 02/11/18 06:00 





 02/11/18 06:00 





 











PT  18.0 SECONDS (9.4-12.5)  H  02/08/18  15:45    


 


INR  1.56  (0.93-1.08)  H  02/08/18  15:45    


 


APTT  33.9 Seconds (25.1-36.5)   02/08/18  15:45    














- Head Exam


Head Exam: ATRAUMATIC





- Eye Exam


Eye Exam: Normal appearance





- ENT Exam


ENT Exam: Mucous Membranes Dry





- Cardiovascular Exam


Cardiovascular Exam: +S1, +S2





- GI/Abdominal Exam


GI & Abdominal Exam: Normal Bowel Sounds





- Extremities Exam


Extremities Exam: Pedal Edema





Assessment and Plan


(1) Bone lesion


Assessment & Plan: 


likely prostate mets





Status: Acute   





(2) Pleural effusion


Assessment & Plan: 


not currently symptomatic


Status: Acute   





(3) Leukopenia


Assessment & Plan: 


no neutropenia


may be related to bone mets


Status: Acute   





(4) Anemia


Assessment & Plan: 


mild, chronic disease


may be related to bone mets


Status: Acute   





(5) Coagulopathy


Status: Acute

## 2018-02-11 NOTE — PN
DATE:  02/11/2018



LOCATION:  Patient is seen in room 272, bed 2.



SUBJECTIVE:  Patient lying in the bed.  The patient's alert, awake,

oriented x3.  Patient does not appear to be in any distress.  Overnight

nurse's notes were reviewed.  Patient denies any chest pain.  Denies any

shortness of breath.  Patient's leg swelling has significantly resolved to

trace pitting edema, but still has foot swelling.



PHYSICAL EXAMINATION:

VITAL SIGNS:  T max is 97.5.  Telemetry shows atrial fibrillation.  Heart

rate 115, 91, 61, 98, 118, 76; blood pressure _____, 114/74, 136/94,

119/80, 132/92; respiration 18; O2 sat is 97% to 100%.

INTAKE AND OUTPUT:  Output yesterday 3200.  Today's output is 3000. 

Patient's weight is 203 pounds from admission weight of 210.

HEENT:  Head examination normocephalic, atraumatic.  HEENT examination

shows pinkish conjunctivae.  Anicteric sclerae.  No oropharyngeal lesion. 

Positive jugular venous distention.

CHEST:  Kyphosis.

LUNGS:  Shows decreased breath sounds, significantly decrease in crackles,

rales and creps.

CARDIOVASCULAR:  Shows S1, S2, irregular rhythm.  Positive systolic murmur

left sternal border, right second intercostal space, left second

intercostal space.

ABDOMEN:  Abdominal distention is significantly reduced.  Abdomen is soft. 

Positive bowel sounds.  No appreciable hepatosplenomegaly.  No guarding. 

No rigidity.  No rebound tenderness.

GENITALIA:  Male.

RECTAL:  Examination is deferred.  Positive Fraire catheter.

EXTREMITIES:  Show bilateral lower extremity resolution of the pitting

edema, positive swelling of the bilateral feet noted.

MUSCULOSKELETAL:  Shows a body mass index of 28.4.

NEUROLOGIC:  Patient is alert, awake and oriented x3.  Cranial nerves II

through XII grossly intact.



DIAGNOSTICS:  On 02/11, WBC 3.9, hemoglobin and hematocrit 13 and 39,

platelet _____.  Sodium 127, potassium 4.3, chloride 89, CO2 27, anion gap

15, BUN 13, creatinine 1.2, GFR greater than 60, glucose 81, calcium 9.2,

magnesium 1.7, total bili 1.5.  LFTs are normal.  Total PSA is 103.4, which

should be less than 4.0.  Prostate cancer risk is pending.



EKG from 02/09 shows atrial fibrillation, low voltage PVCs, lateral

ischemic changes with T-wave inversion in I, aVL and V4 to V6.  Patient

seen by Hematology/Oncology.  Patient seen by Urology this morning.  The

only recommendations noted on the Urology note is possible cystoscopy on

02/12.



IMPRESSION AND PLAN:

1.  New-onset atrial fibrillation with rapid ventricular response.

2.  Dilated cardiomyopathy.

3.  Acute systolic congestive heart failure with elevated brain natriuretic

peptide.

4.  Hyponatremia.

5.  Questionable syndrome of inappropriate antidiuretic hormone secretion.

6.  Most probable metastatic prostate carcinoma with multiple bone

metastases.

7.  Leukopenia and anemia.

8.  Transient lactic acidosis.

9.  Hypokalemia.

10.  Hyperbilirubinemia.

11.  Elevated prostate-specific antigen of greater than 90.

12.  Proteinuria, microscopic hematuria.

13.  Moderate pulmonary arterial hypertension with moderate tricuspid

regurgitation and elevated right ventricular systolic pressure of 52 mmHg.

14.  Bilateral lower extremity lymphedema and venous stasis, resolving.

15.  Lateral coronary ischemic changes.

16.  Constipation.

17.  Hypercholesteremia.

18.  Hypomagnesemia.

19.  Hyperuricemia.

1.  Atrial fibrillation with rapid ventricular response.

2.  Dilated cardiomyopathy with ejection fraction of 21%.

3.  Concentric left ventricular hypertrophy.

4.  Moderate to severely impaired left ventricular function with left

ventricle ejection fraction of 21%.

5.  Global left ventricular hypokinesis.

6.  Moderately reduced right ventricular systolic function.

7.  Moderately dilated left and right atrium.

8.  Moderately thickened aortic valve.

9.  Moderately thickened mitral valve with moderate mitral regurgitation.

10.  Moderate tricuspid regurgitation with moderate pulmonary arterial

hypertension with right ventricular systolic pressure of 52 mmHg.

11.  Atrial fibrillation with rapid ventricular response.

12.  Gait dysfunction.

13.  Bilateral lower extremity lymphedema and venous stasis.

14.  Leukopenia.

15.  Anemia.

16.  Lactic acidosis.

17.  Hypokalemia.

18.  Hyperbilirubinemia.

19.  Elevated prostate-specific antigen.

20.  Hyperbilirubinemia.

21.  Hyponatremia.

22.  Possible syndrome of inappropriate antidiuretic hormone.

23.  Systolic congestive heart failure with elevated BNP.

24.  Systolic and diastolic congestive heart failure with elevated BNP,

decreased left ventricular ejection fraction of 21% and moderate pulmonary

arterial hypertension, moderate tricuspid regurgitation.

1.  Hypertension.

2.  Atrial fibrillation with rapid ventricular response.

3.  Acute systolic and diastolic congestive heart failure with decreased

left ventricular ejection fraction of 20% and abnormal weight gain and

bilateral lower extremity lymphedema.

4. Leukopenia.

5.  Transient lactic acidosis.

6.  Hyponatremia, possibly syndrome of inappropriate antidiuretic hormone

secretion.

7.  Hypomagnesemia.

8.  Hyperbilirubinemia.

9.  Elevated prostate-specific antigen of greater than 90.

10.  Gait dysfunction.

11.  Deconditioning.

12.  Proteinuria, hematuria, bacteriuria.

13.  Axial skeleton diffuse osseous metastatic disease with abnormal

increased uptake of the thoracolumbar spine, right iliac bone, posterior

rib in the right rib, bilateral scapula, proximal femur bilaterally

including the lesser trochanter on the right and subtrochanteric region on

the left with increased uptake in the sternum.

14.  Cerebral cortical atrophy of the brain with moderate volume loss.

15.  Centrilobular and paraseptal emphysema with atelectasis scarring.

16.  Subcentimeter pulmonary nodules of 0.3 cm.

17.  Bilateral pleural effusion.

18.  Cardiomegaly.

19.  Scattered sclerotic bony lesion.

20.  Moderate diffuse stranding of the subcutaneous tissue and anasarca.

21.  Bilateral adrenal gland hypertrophy.

22.  Probable right renal cyst.

23.  Colonic diverticulosis.

24.  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

25.  Degenerative joint disease of the spine.

26.  Small fat-containing umbilical hernia.

27.  Small fat-containing inguinal hernia.

28.  Subcentimeter retroperitoneal lymphadenopathy.

29.  Dilated cardiomyopathy with left ventricular ejection fraction of 21%.

30.  Concentric left ventricular hypertrophy.

31.  Moderate-to-severely impaired left ventricular systolic function with

left ventricular ejection fraction of 21% and left ventricular global

hypokinesis.

32.  Moderately reduced right ventricular systolic function.

33.  Moderately dilated left and right atrium.

34.  Moderately thickened aortic valve.

35.  Moderately thickened mitral valve.

36.  Moderate mitral regurgitation.

37.  Moderate tricuspid regurgitation with moderate pulmonary arterial

hypertension with right ventricular systolic pressure of 52 mmHg.

38.  History of poor compliance and noncompliance.

39.  Hypervolemic hyponatremia secondary to systolic congestive heart

failure.

40.  Increased urinary retention.

41.  Possible and probable stage IV prostate carcinoma with bone metastasis

and extremely high PSA of greater than 90.

1.  Atrial fibrillation with rapid ventricular response.

2.  Noel-inferolateral coronary ischemia.

3.  History of hypertension.

4.  Most possible and most likely systolic congestive heart failure with

elevated BNP.

5.  Cardiomegaly and pulmonary vascular congestion, suggestive of

congestive heart failure.

6.  Leukopenia.

7.  Lactic acidosis.

8.  Hyponatremia, etiology undetermined.

9.  Questionable syndrome of inappropriate (excretion) of antidiuretic

hormone.

10.  Indeterminate troponin.

11.  Elevated BNP suggestive of congestive heart failure.

12.  Trace proteinuria, trace microscopic hematuria, trace bacteriuria.

13.  Bilateral lower extremity lymphedema.

14.  Anasarca.

15.  Extremely poor compliance and noncompliance.

16.  History of hypovitaminosis D.

17.  History of hyperuricemia, hypomagnesemia and history of elevated

prostate-specific antigen.



Plan at this time, patient has been ordered to repeat CMP, LFT, magnesium,

CBC, PT/PTT.



CONSULTATION:

1.  Cardiology.

2.  Nephrology.

3.  Urology.

4.  Hematology/Oncology.

5.  Interventional Radiology,

6.  TCU evaluation.



CURRENT MEDICATIONS:

1.  Colace 100 mg three times a day.

2.  Dobutamine drip at 5 mcg/kg per minute.

3.  Aspirin 81 mg daily.

4.  K-Dur 20 mEq twice a day.

5.  Lasix decreased to 40 mg IV daily by Dr. Edge.

6.  Lipitor 40 mg daily.

7.  Lopressor 50 mg twice a day.

8.  Lovenox 40 mg subcu daily.

9.  Magnesium oxide 400 mg three times a day.

10.  Protonix 40 mg daily.

11.  Allopurinol 300 mg daily.



Heart-healthy diet, out of bed, SILVIANO stockings, SCDs, occupational therapy,

physical therapy ordered.  At present, patient will be continued with above

therapeutic intervention.  Patient has again been ordered SCDs, SILVIANO

stockings.  Patient has been updated about his condition, diagnosis,

treatment plan, management plan at length.  Patient was advised about the

need for prostate biopsy in order to confirm the diagnosis, which he

acknowledged and understand.



Dictated and electronically signed, not read.





__________________________________________

Dale Márquez MD





DD:  02/11/2018 17:22:55

DT:  02/11/2018 17:36:04

Job # [id number]



[id number]

## 2018-02-11 NOTE — CP.PCM.PN
Subjective





- Date & Time of Evaluation


Date of Evaluation: 02/11/18


Time of Evaluation: 12:30





- Subjective


Subjective: 





RENAL FOLLOW UP








S: seen and examined feels ok no complaints, kim in place





pe:


vs as below


gen: nad


sclera: anicteric


op: clear


neck: supple


cv: +s1+s2


lungs: dec bs at bases


abd: soft


ext: 1+ edema


neuro: a+ox3


psych: nml affect


skin: no rash








labs and imaging reviewed





imp:


Hyponatremia/ Acute on Chronic systolic heart failure/  Anemia / BPH/ 

Obstructive uropathy / hypomag








plan:


Na slightly worse today,  if drops further would recc  hold lasix, will 

consider tolvaptan, may have element of SIAdh from ? met prostate ca 


f/u gu eval


on oral mag


k stable


hgb stable








Objective





- Vital Signs/Intake and Output


Vital Signs (last 24 hours): 


 











Temp Pulse Resp BP Pulse Ox


 


 97.5 F L  91 H  19   114/74   100 


 


 02/11/18 12:00  02/11/18 12:00  02/11/18 12:00  02/11/18 12:00  02/11/18 05:59








Intake and Output: 


 











 02/11/18 02/11/18





 06:59 18:59


 


Intake Total 550 


 


Output Total 3000 


 


Balance -2450 














- Medications


Medications: 


 Current Medications





Allopurinol (Zyloprim)  300 mg PO DAILY [facility]


   Last Admin: 02/11/18 11:14 Dose:  300 mg


Aspirin (Ecotrin)  81 mg PO DAILY [facility]


   Last Admin: 02/11/18 11:13 Dose:  81 mg


Atorvastatin Calcium (Lipitor)  40 mg PO DIN [facility]


   Last Admin: 02/10/18 17:12 Dose:  40 mg


Docusate Sodium (Colace)  100 mg PO TID [facility]


   Last Admin: 02/11/18 11:13 Dose:  100 mg


Enoxaparin Sodium (Lovenox)  100 mg SC Q12H [facility]


   PRN Reason: Protocol


   Last Admin: 02/11/18 08:45 Dose:  100 mg


Furosemide (Lasix)  40 mg IVP Q12H [facility]


   Last Admin: 02/11/18 08:45 Dose:  40 mg


Dobutamine HCl/Dextrose (Dobutamine/Dextrose 5% 500mg/250ml)  500 mg in 250 mls 

@ 14.696 mls/hr IV .Q17H1M PRN; Protocol; 5 MCG/KG/MIN


   PRN Reason: TITRATE PER PROTOCOL


   Last Admin: 02/11/18 06:12 Dose:  5 mcg/kg/min, 14.696 mls/hr


Magnesium Oxide (Mag-Ox)  400 mg PO TID [facility]


   Last Admin: 02/11/18 11:13 Dose:  400 mg


Metoprolol Tartrate (Lopressor)  50 mg PO BID [facility]


   Last Admin: 02/11/18 11:13 Dose:  50 mg


Pantoprazole Sodium (Protonix Ec Tab)  20 mg PO 0600,1600 [facility]


   Last Admin: 02/11/18 06:11 Dose:  20 mg


Potassium Chloride (K-Dur 20 Meq Er Tab)  20 meq PO BID [facility]


   Last Admin: 02/11/18 11:15 Dose:  20 meq











- Labs


Labs: 


 





 02/11/18 06:00 





 02/11/18 06:00 





 











PT  18.0 SECONDS (9.4-12.5)  H  02/08/18  15:45    


 


INR  1.56  (0.93-1.08)  H  02/08/18  15:45    


 


APTT  33.9 Seconds (25.1-36.5)   02/08/18  15:45

## 2018-02-11 NOTE — PN
DATE:



SUBJECTIVE:  Patient's shortness of breath improved.



PHYSICAL EXAMINATION:

VITAL SIGNS:  Blood pressure 114/74, heart rate 91, temperature 97.5,

respiration 19.

HEENT:  Normocephalic.

CHEST:  Absent breath sound over the bases.

HEART:  S1, S2, regular.

EXTREMITIES:  1+ pitting edema.



LABORATORY DATA:  Hemoglobin and hemoglobin 13 and 38.5.  Today's SMA-7: 

Sodium 127, potassium 4.3, chloride 89, CO2 of 27, glucose 81, BUN 13, and

creatinine 1.2.  EKG revealed atrial fibrillation, rate 103.  Consider

lateral ischemia, PVCs versus aberrancy.



ASSESSMENT:

1.  Congestive heart failure.

2.  Atrial fibrillation.

3.  Hyponatremia.

4.  Osseous metastasis within the axial skeleton.



RECOMMENDATIONS:  Continue aspirin 81 mg once a day, K-Dur 20 mEq twice a

day, change Lasix to 40 mg p.o. once a day, change Lovenox to 40 mg

subcutaneous once a day, and conservative medical approach.  As the patient

remains hyponatremic, discontinue dietary sodium restriction.





__________________________________________

Henry Edge MD



DD:  02/11/2018 14:09:23

DT:  02/11/2018 18:53:14

Job # [id number]

## 2018-02-12 LAB
ALBUMIN SERPL-MCNC: 3.3 G/DL (ref 3–4.8)
ALBUMIN/GLOB SERPL: 1.1 {RATIO} (ref 1.1–1.8)
ALT SERPL-CCNC: 28 U/L (ref 7–56)
APTT BLD: 46.1 SECONDS (ref 25.1–36.5)
AST SERPL-CCNC: 42 U/L (ref 17–59)
BASOPHILS # BLD AUTO: 0.02 K/MM3 (ref 0–2)
BASOPHILS NFR BLD: 0.4 % (ref 0–3)
BILIRUB DIRECT SERPL-MCNC: 1 MG/DL (ref 0–0.4)
BUN SERPL-MCNC: 13 MG/DL (ref 7–21)
CALCIUM SERPL-MCNC: 9 MG/DL (ref 8.4–10.5)
EOSINOPHIL # BLD: 0.1 10*3/UL (ref 0–0.7)
EOSINOPHIL NFR BLD: 1.4 % (ref 1.5–5)
ERYTHROCYTE [DISTWIDTH] IN BLOOD BY AUTOMATED COUNT: 15.4 % (ref 11.5–14.5)
GFR NON-AFRICAN AMERICAN: 58
GRANULOCYTES # BLD: 3.32 10*3/UL (ref 1.4–6.5)
GRANULOCYTES NFR BLD: 68.2 % (ref 50–68)
HGB BLD-MCNC: 13.4 G/DL (ref 14–18)
INR PPP: 1.39 (ref 0.93–1.08)
LYMPHOCYTES # BLD: 0.8 10*3/UL (ref 1.2–3.4)
LYMPHOCYTES NFR BLD AUTO: 16.2 % (ref 22–35)
MAGNESIUM SERPL-MCNC: 1.6 MG/DL (ref 1.7–2.2)
MCH RBC QN AUTO: 28.7 PG (ref 25–35)
MCHC RBC AUTO-ENTMCNC: 34 G/DL (ref 31–37)
MCV RBC AUTO: 84.4 FL (ref 80–105)
MONOCYTES # BLD AUTO: 0.7 10*3/UL (ref 0.1–0.6)
MONOCYTES NFR BLD: 13.8 % (ref 1–6)
PLATELET # BLD: 138 10^3/UL (ref 120–450)
PMV BLD AUTO: 10.1 FL (ref 7–11)
PROTHROMBIN TIME: 16.1 SECONDS (ref 9.4–12.5)
RBC # BLD AUTO: 4.67 10^6/UL (ref 3.5–6.1)
WBC # BLD AUTO: 4.9 10^3/UL (ref 4.5–11)

## 2018-02-12 RX ADMIN — Medication SCH MG: at 17:30

## 2018-02-12 RX ADMIN — ENOXAPARIN SODIUM SCH MG: 40 INJECTION SUBCUTANEOUS at 10:14

## 2018-02-12 RX ADMIN — PANTOPRAZOLE SODIUM SCH MG: 20 TABLET, DELAYED RELEASE ORAL at 17:31

## 2018-02-12 RX ADMIN — PANTOPRAZOLE SODIUM SCH MG: 20 TABLET, DELAYED RELEASE ORAL at 05:35

## 2018-02-12 RX ADMIN — DOBUTAMINE HYDROCHLORIDE PRN MLS/HR: 200 INJECTION INTRAVENOUS at 01:40

## 2018-02-12 NOTE — PN
DATE:



FOLLOWUP



SUBJECTIVE:  The patient denies any chest pain.  Shortness of breath is

improved.



PHYSICAL EXAMINATION:

VITAL SIGNS:  Blood pressure 112/69, heart rate 104, temperature 98,

respirations 20.

HEENT:  Normocephalic.

CHEST:  Minimal basal rhonchi.

HEART:  S1 and S2 regular.

ABDOMEN:  Soft.

EXTREMITIES:  1+ pitting edema.



LABORATORY DATA:  Today's hemoglobin and hematocrit 13.4 and 39.4.  White

count and platelet count are within normal limit.  SMA-7:  Sodium 125,

potassium 4.8, chloride 89, CO2 of 28, glucose 97, BUN 13, creatinine 1.2. 

Yesterday's EKG revealed atrial fibrillation with rapid ventricular

response at rate of 133.  Poor R-wave progression.



ASSESSMENT:

1.  Congestive heart failure.

2.  Persistent atrial fibrillation.

3.  Hyponatremia.

4.  Bone metastasis, most likely prostatic cancer is the primary.



RECOMMENDATIONS:  Discontinue Lasix.  Continue 81 mg once a day, Lovenox is

40 mg once a day, Zyloprim at 300 mg daily.





__________________________________________

Henry Edge MD





DD:  02/12/2018 12:42:12

DT:  02/12/2018 13:01:21

Job # [id number]

## 2018-02-12 NOTE — CP.PCM.PN
Subjective





- Date & Time of Evaluation


Date of Evaluation: 02/12/18


Time of Evaluation: 19:00





- Subjective


Subjective: 





No complaints.


Reports to have urologic procedure tomorrow.





Objective





- Vital Signs/Intake and Output


Vital Signs (last 24 hours): 


 











Temp Pulse Resp BP Pulse Ox


 


 97.3 F L  99 H  18   94/67 L  96 


 


 02/12/18 12:00  02/12/18 18:00  02/12/18 12:00  02/12/18 17:26  02/12/18 06:00








Intake and Output: 


 











 02/12/18 02/13/18





 18:59 06:59


 


Intake Total 480 


 


Output Total 300 


 


Balance 180 














- Medications


Medications: 


 Current Medications





Allopurinol (Zyloprim)  300 mg PO DAILY [facility]


   Last Admin: 02/12/18 10:54 Dose:  300 mg


Aspirin (Ecotrin)  81 mg PO DAILY [facility]


   Last Admin: 02/12/18 10:13 Dose:  81 mg


Atorvastatin Calcium (Lipitor)  40 mg PO DIN [facility]


   Last Admin: 02/12/18 17:30 Dose:  40 mg


Docusate Sodium (Colace)  100 mg PO TID [facility]


   Last Admin: 02/12/18 17:30 Dose:  100 mg


Enoxaparin Sodium (Lovenox)  40 mg SC DAILY [facility]


   PRN Reason: Protocol


   Last Admin: 02/12/18 10:14 Dose:  40 mg


Magnesium Oxide (Mag-Ox)  800 mg PO BID [facility]


   Last Admin: 02/12/18 17:30 Dose:  800 mg


Metoprolol Tartrate (Lopressor)  50 mg PO BID [facility]


   Last Admin: 02/12/18 17:26 Dose:  Not Given


Pantoprazole Sodium (Protonix Ec Tab)  20 mg PO 0600,1600 [facility]


   Last Admin: 02/12/18 17:31 Dose:  20 mg











- Labs


Labs: 


 





 02/12/18 04:30 





 02/12/18 19:02 





 











PT  16.1 SECONDS (9.4-12.5)  H  02/12/18  04:30    


 


INR  1.39  (0.93-1.08)  H  02/12/18  04:30    


 


APTT  46.1 Seconds (25.1-36.5)  H  02/12/18  04:30    














- Head Exam


Head Exam: ATRAUMATIC





- Eye Exam


Eye Exam: Normal appearance





- ENT Exam


ENT Exam: Mucous Membranes Dry





- Respiratory Exam


Respiratory Exam: NORMAL BREATHING PATTERN





- Cardiovascular Exam


Cardiovascular Exam: +S1, +S2





- GI/Abdominal Exam


GI & Abdominal Exam: Normal Bowel Sounds





- Extremities Exam


Extremities Exam: Pedal Edema





Assessment and Plan


(1) Bone lesion


Assessment & Plan: 


elevated PSA


suspect metastatic prostate cancer


for urologic procedure tomorrow 


Status: Acute   





(2) Pleural effusion


Assessment & Plan: 


reports gets short of breath off oxygen


consider thoracentesis


Status: Acute   





(3) Leukopenia


Assessment & Plan: 


intermittent


no neutropenia


Status: Acute   





(4) Anemia


Assessment & Plan: 


chronic disease





Status: Acute   





(5) Coagulopathy


Status: Acute

## 2018-02-12 NOTE — CARD
--------------- APPROVED REPORT --------------





EKG Measurement

Heart [id number]

[id number]

[id number]

[id number]



<Conclusion>

Atrial fibrillation with rapid ventricular response 

Rightward axis

Low voltage QRS limb leads

PRWP

Nonspecific T wave abnormality

No change

## 2018-02-12 NOTE — PCM.URO
Urology Progress Note





- Objective


Lab Studies: Reviewed (plans for possible cysto 2/14 if medically cleared)


Lab Results Last 24 Hours: 


 Laboratory Results - last 24 hr











  02/12/18 02/12/18 02/12/18





  03:20 04:30 04:30


 


WBC   4.9  D 


 


RBC   4.67 


 


Hgb   13.4 L 


 


Hct   39.4 L 


 


MCV   84.4 


 


MCH   28.7 


 


MCHC   34.0 


 


RDW   15.4 H 


 


Plt Count   138 


 


MPV   10.1 


 


Gran %   68.2 H 


 


Lymph % (Auto)   16.2 L 


 


Mono % (Auto)   13.8 H 


 


Eos % (Auto)   1.4 L 


 


Baso % (Auto)   0.4 


 


Gran #   3.32 


 


Lymph # (Auto)   0.8 L 


 


Mono # (Auto)   0.7 H 


 


Eos # (Auto)   0.1 


 


Baso # (Auto)   0.02 


 


PT    16.1 H


 


INR    1.39 H


 


APTT    46.1 H


 


Sodium  125 L  


 


Potassium  4.8  


 


Chloride  89 L  


 


Carbon Dioxide  27  


 


Anion Gap  14  


 


BUN  13  


 


Creatinine  1.2  


 


Est GFR ( Amer)  > 60  


 


Est GFR (Non-Af Amer)  58  


 


Random Glucose  97  


 


Calcium  9.0  


 


Magnesium  1.6 L  


 


Total Bilirubin  1.7 H  


 


Direct Bilirubin  1.0 H  


 


AST  42  


 


ALT  28  


 


Alkaline Phosphatase  93  


 


Total Protein  6.4  


 


Albumin  3.3  


 


Globulin  3.1  


 


Albumin/Globulin Ratio  1.1  














  02/12/18 02/12/18





  12:20 19:02


 


WBC  


 


RBC  


 


Hgb  


 


Hct  


 


MCV  


 


MCH  


 


MCHC  


 


RDW  


 


Plt Count  


 


MPV  


 


Gran %  


 


Lymph % (Auto)  


 


Mono % (Auto)  


 


Eos % (Auto)  


 


Baso % (Auto)  


 


Gran #  


 


Lymph # (Auto)  


 


Mono # (Auto)  


 


Eos # (Auto)  


 


Baso # (Auto)  


 


PT  


 


INR  


 


APTT  


 


Sodium  124 L  125 L


 


Potassium  


 


Chloride  


 


Carbon Dioxide  


 


Anion Gap  


 


BUN  


 


Creatinine  


 


Est GFR ( Amer)  


 


Est GFR (Non-Af Amer)  


 


Random Glucose  


 


Calcium  


 


Magnesium  


 


Total Bilirubin  


 


Direct Bilirubin  


 


AST  


 


ALT  


 


Alkaline Phosphatase  


 


Total Protein  


 


Albumin  


 


Globulin  


 


Albumin/Globulin Ratio  











Intake & Output: 


 Intake & Output











 02/12/18 02/12/18 02/13/18





 06:59 18:59 06:59


 


Intake Total 730 480 


 


Output Total 1400 300 


 


Balance -670 180 


 


Weight  200 lb 4.8 oz 


 


Intake:   


 


    


 


  Oral 480 480 


 


Output:   


 


  Urine 1400 300 


 


    Urethral (Fraire) 1400 300 


 


Other:   


 


  # Bowel Movements 2 0 











Vital Signs: 


 Vital Signs - 24 hr











  02/12/18 02/12/18 02/12/18





  00:01 01:40 02:00


 


Temperature 98.6 F  


 


Pulse Rate 100 H 101 H 134 H


 


Respiratory 20  





Rate   


 


Blood Pressure 102/82 115/85 


 


O2 Sat by Pulse 98  





Oximetry   














  02/12/18 02/12/18 02/12/18





  03:19 03:43 04:24


 


Temperature  98 F 


 


Pulse Rate 133 H 120 H 130 H


 


Respiratory  20 





Rate   


 


Blood Pressure  117/82 128/70


 


O2 Sat by Pulse  95 





Oximetry   














  02/12/18 02/12/18 02/12/18





  06:00 06:21 10:00


 


Temperature 98 F  


 


Pulse Rate 120 H  100 H


 


Respiratory 20  





Rate   


 


Blood Pressure 98/62 L 112/69 


 


O2 Sat by Pulse 96  





Oximetry   














  02/12/18 02/12/18 02/12/18





  10:14 12:00 17:26


 


Temperature  97.3 F L 


 


Pulse Rate 104 H 52 L 


 


Respiratory  18 





Rate   


 


Blood Pressure 112/69 110/71 94/67 L


 


O2 Sat by Pulse   





Oximetry   














  02/12/18





  18:00


 


Temperature 97.5 F L


 


Pulse Rate 100 H


 


Respiratory 18





Rate 


 


Blood Pressure 94/67 L


 


O2 Sat by Pulse 





Oximetry

## 2018-02-12 NOTE — CP.PCM.PN
Subjective





- Date & Time of Evaluation


Date of Evaluation: 02/12/18


Time of Evaluation: 11:00





- Subjective


Subjective: 


Follow up Nephrology Consultation Note





Assessment: Stable


Hypervolemic hyponatremia likely due to CHF and ? contribution by metastatic 

cancer leading to excess ADH (SIADH)


Hypertensive Chronic Kidney Disease (I12.9)





Chronic Kidney Disease (N18.3) Stage3 with Cr 1.2


metastatic cancer, HTN (I12.9)


CHF LVEF 20%


Hypokalemia hypomagnesemia, hx of BPH





Plan


No acute need for renal replacement therapy at this time. 


Hypertension control with meds as ordered. 


Monitor Input/Output, daily weights and renal function with basic metabolic 

panel


supplement electrolytes as needed


since Na worsened after diuresis with lasix, will dose with tolvaptan 15 mg 

today. monitor Na q 6 hr. avoid correction in serum Na > 6-8 meq/24 hrs


should be able to resume diuretics as lasix soon


Avoid nephrotoxins/NSAIDs


Glycemic control


Further work up for as per primary team





Thanks for allowing me to participate in care of your patient. Will follow 

patient with you. Please call if any Qs. d/w team


Dr Rickey Tapia


Office: [phone] Cell: [phone] Fax: [phone]





Subjective: Noted events overnight. Patients feels okay. 


Denies chest pain, palpitation, improved shortness of breath, leg swelling. 


All other negative





Physical Examination:      


General Appearance: Comfortable, in no acute respiratory distress, co-operative 

. 


Vitals reviewed and noted as below


Head; Atraumatic, normocephalic


ENT: no ulcers no thrush. Tongue is midline. Oropharynx: no rash or ulcers.


EYES: Pupils are equal, round and reactive to light accommodation. Eye muscles 

and extraocular movement intact. Sclera is anicteric.


Neck; supple no lymphadenopathy, no thyromegaly or bruit


Lungs: Normal respiratory rate/effort. Breath sounds bilateral equal and clear. 

somewhat decreased at bases


Heart: Normal rate. s1s2 normal. No rub or gallop. 


Extremities: no edema. No varicose veins


Neurological: Patient is alert, awake and oriented to person, place and time. 

No focal deficit. Strength bilateral appropriate and equal


Skin: Warm and dry. Normal turgor. No rash. Palpitation: Normal elasticity for 

age


Abdomen: Abdomen is soft. Bowel sounds +. There is no abdominal tenderness, no 

guarding/rigidity no organomegaly


Psych: normal insight and normal affect/mood


MSK: no joint tenderness or swelling. Digits and nails normal, no deformity


: kidney or bladder not palpable





Labs/imaging reviewed.


Past medical history, past surgical history, family history, social history, 

allergy reviewed and noted as below


Family hx: no hx of CKD. Rest non-contributory 





urine Na 75 urine osmol 190 Serum osmol 281 uric acid 7.9 TSH 2.3 LVEF 20%





Objective





- Vital Signs/Intake and Output


Vital Signs (last 24 hours): 


 











Temp Pulse Resp BP Pulse Ox


 


 97.3 F L  52 L  18   94/67 L  96 


 


 02/12/18 12:00  02/12/18 12:00  02/12/18 12:00  02/12/18 17:26  02/12/18 06:00








Intake and Output: 


 











 02/12/18 02/13/18





 18:59 06:59


 


Intake Total 480 


 


Output Total 300 


 


Balance 180 














- Medications


Medications: 


 Current Medications





Allopurinol (Zyloprim)  [facility]


   Last Admin: 02/12/18 10:54 Dose:  300 mg


Aspirin (Ecotrin)  81 mg [facility]ILY [facility]


   Last Admin: 02/12/18 10:13 Dose:  81 mg


Atorvastatin Calcium (Lipitor)  40 mg PO DIN [facility]


   Last Admin: 02/12/18 17:30 Dose:  40 mg


Docusate Sodium (Colace)  100 mg PO TID [facility]


   Last Admin: 02/12/18 17:30 Dose:  100 mg


Enoxaparin Sodium (Lovenox)  40 mg SC [facility]ILY [facility]


   PRN Reason: Protocol


   Last Admin: 02/12/18 10:14 Dose:  40 mg


Magnesium Oxide (Mag-Ox)  800 mg PO BID [facility]


   Last Admin: 02/12/18 17:30 Dose:  800 mg


Metoprolol Tartrate (Lopressor)  50 mg PO BID [facility]


   Last Admin: 02/12/18 17:26 Dose:  Not Given


Pantoprazole Sodium (Protonix Ec Tab)  20 mg PO 0600,1600 [facility]


   Last Admin: 02/12/18 17:31 Dose:  20 mg











- Labs


Labs: 


 





 02/12/18 04:30 





 02/12/18 12:20 





 











PT  16.1 SECONDS (9.4-12.5)  H  02/12/18  04:30    


 


INR  1.39  (0.93-1.08)  H  02/12/18  04:30    


 


APTT  46.1 Seconds (25.1-36.5)  H  02/12/18  04:30

## 2018-02-12 NOTE — CP.PCM.PN
<Brian Gasca - Last Filed: 02/13/18 11:05>





Subjective





- Date & Time of Evaluation


Date of Evaluation: 02/12/18


Time of Evaluation: 10:18





- Subjective


Subjective: 





Patient seen and examined at bedside. Patient doing well with no acute events 

overnight. Patient with no complaints at this time. Patient had a bowel 

movement yesterday. Denies chest pain, shortness of breath, nausea, vomiting, 

diarrhea, fever, chills. 





Objective





- Vital Signs/Intake and Output


Vital Signs (last 24 hours): 


 











Temp Pulse Resp BP Pulse Ox


 


 98 F   120 H  20   112/69   96 


 


 02/12/18 06:00  02/12/18 06:00  02/12/18 06:00  02/12/18 06:21  02/12/18 06:00








Intake and Output: 


 











 02/12/18 02/12/18





 06:59 18:59


 


Intake Total 730 


 


Output Total 1400 


 


Balance -670 














- Medications


Medications: 


 Current Medications





Allopurinol (Zyloprim)  300 mg PO DAILY [facility]


   Last Admin: 02/11/18 11:14 Dose:  300 mg


Aspirin (Ecotrin)  81 mg PO DAILY [facility]


   Last Admin: 02/11/18 11:13 Dose:  81 mg


Atorvastatin Calcium (Lipitor)  40 mg PO DIN [facility]


   Last Admin: 02/11/18 17:41 Dose:  40 mg


Docusate Sodium (Colace)  100 mg PO TID [facility]


   Last Admin: 02/11/18 17:41 Dose:  100 mg


Enoxaparin Sodium (Lovenox)  40 mg SC DAILY [facility]


   PRN Reason: Protocol


Magnesium Oxide (Mag-Ox)  800 mg PO BID [facility]


Metoprolol Tartrate (Lopressor)  50 mg PO BID [facility]


   Last Admin: 02/11/18 17:41 Dose:  50 mg


Pantoprazole Sodium (Protonix Ec Tab)  20 mg PO 0600,1600 [facility]


   Last Admin: 02/12/18 05:35 Dose:  20 mg











- Labs


Labs: 


 





 02/12/18 04:30 





 02/12/18 03:20 





 











PT  16.1 SECONDS (9.4-12.5)  H  02/12/18  04:30    


 


INR  1.39  (0.93-1.08)  H  02/12/18  04:30    


 


APTT  46.1 Seconds (25.1-36.5)  H  02/12/18  04:30    














- Constitutional


Appears: Non-toxic, No Acute Distress





- Head Exam


Head Exam: ATRAUMATIC, NORMAL INSPECTION, NORMOCEPHALIC





- ENT Exam


ENT Exam: Mucous Membranes Moist





- Respiratory Exam


Respiratory Exam: Clear to Ausculation Bilateral, NORMAL BREATHING PATTERN





- Cardiovascular Exam


Cardiovascular Exam: RRR, +S1, +S2





- GI/Abdominal Exam


GI & Abdominal Exam: Soft, Normal Bowel Sounds.  absent: Tenderness





- Extremities Exam


Extremities Exam: Pedal Edema (+1 edema b/l).  absent: Calf Tenderness





- Neurological Exam


Neurological Exam: Alert, Awake, Oriented x3





- Psychiatric Exam


Psychiatric exam: Normal Affect, Normal Mood





- Skin


Skin Exam: Intact, Normal Color, Warm





Assessment and Plan





- Assessment and Plan (Free Text)


Plan: 





78 y/o male with past medical history of CHF and A-fib initially presenting for 

A-fib with RVR and found to have elevated PSA. Patient subsequently had a bone 

scan performed which showed multiple metastatic lesions of the axial skeleton. 

Patient is currently being followed by Oncology, Nephrology, Urology, and IR. 

Patient will need a biopsy of lesions to confirm diagnosis. Patient will 

potentially have a cystoscopy tomorrow, as per Urology. Patient is also 

becoming more hyponatremic, possibly secondary to SIADH from mets. We will 

continue to follow closely and await recommendations from consultants. 





Campos, PGY-2





<Dale Márquez U - Last Filed: 02/23/18 16:30>





Objective





- Vital Signs/Intake and Output


Vital Signs (last 24 hours): 


 











Temp Pulse Resp BP Pulse Ox


 


 97.5 F L  81   18   145/96 H  96 


 


 02/20/18 07:00  02/20/18 09:25  02/20/18 09:00  02/20/18 09:26  02/20/18 09:00











- Labs


Labs: 


 





 02/19/18 07:00 





 02/20/18 11:15 





 











PT  16.7 SECONDS (9.4-12.5)  H  02/19/18  07:00    


 


INR  1.44  (0.93-1.08)  H  02/19/18  07:00    


 


APTT  43.5 Seconds (25.1-36.5)  H  02/19/18  07:00    














Attending/Attestation





- Attestation


I have personally seen and examined this patient.: Yes


I have fully participated in the care of the patient.: Yes


I have reviewed all pertinent clinical information, including history, physical 

exam and plan: Yes


Notes (Text): 


Please see/read my dictated notes.

## 2018-02-12 NOTE — CARD
--------------- APPROVED REPORT --------------





EKG Measurement

Heart [id number]

[id number]

[id number]

[id number]



<Conclusion>

Atrial fibrillation with rapid ventricular responce

Low voltage QRS limb leads

Nonspecific T wave abnormality

No change

## 2018-02-13 LAB
ALBUMIN SERPL-MCNC: 3.5 G/DL (ref 3–4.8)
ALBUMIN/GLOB SERPL: 1.1 {RATIO} (ref 1.1–1.8)
ALT SERPL-CCNC: 34 U/L (ref 7–56)
AST SERPL-CCNC: 58 U/L (ref 17–59)
BASOPHILS # BLD AUTO: 0.04 K/MM3 (ref 0–2)
BASOPHILS NFR BLD: 0.8 % (ref 0–3)
BILIRUB DIRECT SERPL-MCNC: 1 MG/DL (ref 0–0.4)
BUN SERPL-MCNC: 16 MG/DL (ref 7–21)
CALCIUM SERPL-MCNC: 9.5 MG/DL (ref 8.4–10.5)
EOSINOPHIL # BLD: 0.2 10*3/UL (ref 0–0.7)
EOSINOPHIL NFR BLD: 3.4 % (ref 1.5–5)
ERYTHROCYTE [DISTWIDTH] IN BLOOD BY AUTOMATED COUNT: 15.3 % (ref 11.5–14.5)
GFR NON-AFRICAN AMERICAN: 45
GRANULOCYTES # BLD: 2.51 10*3/UL (ref 1.4–6.5)
GRANULOCYTES NFR BLD: 52.8 % (ref 50–68)
HGB BLD-MCNC: 13.7 G/DL (ref 14–18)
LYMPHOCYTES # BLD: 1.3 10*3/UL (ref 1.2–3.4)
LYMPHOCYTES NFR BLD AUTO: 27.5 % (ref 22–35)
MAGNESIUM SERPL-MCNC: 2 MG/DL (ref 1.7–2.2)
MCH RBC QN AUTO: 28.2 PG (ref 25–35)
MCHC RBC AUTO-ENTMCNC: 33 G/DL (ref 31–37)
MCV RBC AUTO: 85.6 FL (ref 80–105)
MONOCYTES # BLD AUTO: 0.7 10*3/UL (ref 0.1–0.6)
MONOCYTES NFR BLD: 15.5 % (ref 1–6)
PLATELET # BLD: 140 10^3/UL (ref 120–450)
PMV BLD AUTO: 10.1 FL (ref 7–11)
RBC # BLD AUTO: 4.85 10^6/UL (ref 3.5–6.1)
WBC # BLD AUTO: 4.8 10^3/UL (ref 4.5–11)

## 2018-02-13 PROCEDURE — 0QB03ZX EXCISION OF LUMBAR VERTEBRA, PERCUTANEOUS APPROACH, DIAGNOSTIC: ICD-10-PCS | Performed by: RADIOLOGY

## 2018-02-13 PROCEDURE — BR29ZZZ COMPUTERIZED TOMOGRAPHY (CT SCAN) OF LUMBAR SPINE: ICD-10-PCS | Performed by: RADIOLOGY

## 2018-02-13 RX ADMIN — PANTOPRAZOLE SODIUM SCH: 20 TABLET, DELAYED RELEASE ORAL at 06:56

## 2018-02-13 RX ADMIN — PANTOPRAZOLE SODIUM SCH MG: 20 TABLET, DELAYED RELEASE ORAL at 17:40

## 2018-02-13 RX ADMIN — Medication SCH MG: at 11:30

## 2018-02-13 RX ADMIN — Medication SCH MG: at 17:42

## 2018-02-13 RX ADMIN — ENOXAPARIN SODIUM SCH MG: 40 INJECTION SUBCUTANEOUS at 11:28

## 2018-02-13 NOTE — PN
DATE:  02/12/2018



SUBJECTIVE:  See the previously dictated consult and daily progress note.



The patient is currently resting comfortably.  The urine is clear.



He is scheduled for a biopsy tomorrow of his bone with Dr. Adam Shine.



See our plans below.



PAST MEDICAL AND SURGICAL:  No other changes.



PHYSICAL EXAMINATION:  No change.



UROLOGY DIAGNOSES:  Elevated PSA, voiding dysfunction, sclerotic bone

lesions, urinary retention and gross hematuria.  From urology standpoint,

the plan is as follows;



For the prostate being elevated, we are going to do a prostate biopsy, but

if there is a bone biopsy that will be done first.  If that shows

metastatic cancer from the prostate to the bone, we can avoid the prostate

biopsy.



Regarding the urinary retention and the gross hematuria, we certainly

recommend a cystoscope.  While we do it, we have arranged for it now for

02/14/2018 a.m., so instead of tomorrow on 02/13/2018, not to have any

conflicting schedules, we are going to have him have the biopsy for his

bone with Dr. Adam Shine and we will arrange for Wednesday, 02/14/2018.



In fact, we may be able to avoid the biopsy for the prostate pending the

results of the bone biopsy.



Further plans will be discussed with Dr. Márquez.





__________________________________________

Kevin Gray MD





DD:  02/13/2018 9:17:20

DT:  02/13/2018 9:30:27

Job # [id number]

## 2018-02-13 NOTE — CP.PCM.PCO
Physician Communication Note





- Physician Communication Note


Physician Communication Note: PATIENT MODERATE SURGICAL RISK 2/2 UNDERLYING 

COMORBIDITIES,NEEDS IV ABX.

## 2018-02-13 NOTE — PN
DATE:  02/12/2018



SUBJECTIVE:  The patient is seen in room 272, bed 2.  Overnight nurse's

notes were reviewed.  The patient had atrial fibrillation with rapid

ventricular response.  The patient in the last few days has atrial

fibrillation with controlled ventricular response, but overnight the

patient had rapid ventricular response requiring IV Lopressor.



PHYSICAL EXAMINATION:

VITAL SIGNS:  T-max is 97.4, 98.6.  Telemetry shows atrial fibrillation,

heart rate in low 100s to 130s and 120s around early morning 4:00 to 5:00

a.m.  Today morning around 10:00 a.m., the patient's heart rate is 104. 

Blood pressure in the last 24 hours 119/62, 136/94, 119/79, 120/82, 128/70,

112/69.  Respiration 18.  O2 sat is 95, 96, 100%.  Yesterday's output is

3000.  Today's output is 1400.

HEENT:  Head examination normocephalic, atraumatic.  HEENT examination

shows pink conjunctivae.  Anicteric sclerae.  No oropharyngeal lesion.

NECK:  No neck rigidity.  No jugular venous distention.

CHEST:  Kyphosis.

LUNGS:  Shows decreased breath sound at the left base.  Decreasing crackle,

rales or wheezing.

CARDIOVASCULAR:  S1, S2.  Irregular rhythm.  Positive systolic murmur, left

sternal border, right second intercostal space, left second intercostal

space.

ABDOMEN:  Soft.  Positive bowel sounds.  Less distended.

GENITALIA:  Male.  Positive Fraire catheter.

EXTREMITY:  Shows complete resolution of the bilateral lower extremity

pitting edema.  There is trace ankle swelling.  Pitting edema of the feet

is also almost resolved.

MUSCULOSKELETAL:  Examination shows a body mass index of 28.  Gait

examination is not tested.

VASCULAR:  Palpable pulses.



DIAGNOSTICS:  On 02/12/2018, WBC 4.9, hemoglobin and hematocrit 13.4 and

39.4, platelets 138.  PT/PTT 16.1 and 46.1, INR 1.39.  Sodium is 125,

potassium 4.8, chloride 89, CO2 of 27, anion gap 14, BUN 13, creatinine

1.2, GFR greater than 60, glucose 97, calcium 9.0, magnesium 1.6.  Total

bili 1.7.  Direct bili 1.0.  HIV nonreactive.  Flow cytometry is negative. 

EKG from today shows atrial fibrillation with rapid ventricle response,

right axis deviation, nonspecific ST changes.



IMPRESSION AND PLAN:

1.  Atrial fibrillation with rapid ventricular response.

2.  Suspect metastatic prostate carcinoma with bone metastasis.

3.  Pleural effusion.

4.  Leukopenia.

5.  Anemia.

6.  Refractory hyponatremia.

7.  Hypervolemic hyponatremia secondary to congestive heart failure.

8.  Possible syndrome of inappropriate antidiuretic hormone.

9.  Hypertensive chronic kidney disease.

10.  Systolic congestive heart failure with cardiomyopathy.

11.  Hypokalemia.

12.  Hypomagnesemia.

13.  Normocytic anemia.

14.  Granulocytosis.

15.  Transient lactic acidosis.

16.  Hypokalemia.

17.  Hyponatremia.

18.  Hypomagnesemia.

19.  Hyperbilirubinemia.

20.  Transient hypotension.

21.  Constipation.

22.  Hypercholesteremia.

23.  Hypomagnesemia.

24.  Hyperuricemia.

1.  New-onset atrial fibrillation with rapid ventricular response.

2.  Dilated cardiomyopathy.

3.  Acute systolic congestive heart failure with elevated brain natriuretic

peptide.

4.  Hyponatremia.

5.  Questionable syndrome of inappropriate antidiuretic hormone secretion.

6.  Most probable metastatic prostate carcinoma with multiple bone

metastases.

7.  Leukopenia and anemia.

8.  Transient lactic acidosis.

9.  Hypokalemia.

10.  Hyperbilirubinemia.

11.  Elevated prostate-specific antigen of greater than 90.

12.  Proteinuria, microscopic hematuria.

13.  Moderate pulmonary arterial hypertension with moderate tricuspid

regurgitation and elevated right ventricular systolic pressure of 52 mmHg.

14.  Bilateral lower extremity lymphedema and venous stasis, resolving.

15.  Lateral coronary ischemic changes.

16.  Constipation.

17.  Hypercholesteremia.

18.  Hypomagnesemia.

19.  Hyperuricemia.

1.  Atrial fibrillation with rapid ventricular response.

2.  Dilated cardiomyopathy with ejection fraction of 21%.

3.  Concentric left ventricular hypertrophy.

4.  Moderate to severely impaired left ventricular function with left

ventricle ejection fraction of 21%.

5.  Global left ventricular hypokinesis.

6.  Moderately reduced right ventricular systolic function.

7.  Moderately dilated left and right atrium.

8.  Moderately thickened aortic valve.

9.  Moderately thickened mitral valve with moderate mitral regurgitation.

10.  Moderate tricuspid regurgitation with moderate pulmonary arterial

hypertension with right ventricular systolic pressure of 52 mmHg.

11.  Atrial fibrillation with rapid ventricular response.

12.  Gait dysfunction.

13.  Bilateral lower extremity lymphedema and venous stasis.

14.  Leukopenia.

15.  Anemia.

16.  Lactic acidosis.

17.  Hypokalemia.

18.  Hyperbilirubinemia.

19.  Elevated prostate-specific antigen.

20.  Hyperbilirubinemia.

21.  Hyponatremia.

22.  Possible syndrome of inappropriate antidiuretic hormone.

23.  Systolic congestive heart failure with elevated BNP.

24.  Systolic and diastolic congestive heart failure with elevated BNP,

decreased left ventricular ejection fraction of 21% and moderate pulmonary

arterial hypertension, moderate tricuspid regurgitation.

1.  Hypertension.

2.  Atrial fibrillation with rapid ventricular response.

3.  Acute systolic and diastolic congestive heart failure with decreased

left ventricular ejection fraction of 20% and abnormal weight gain and

bilateral lower extremity lymphedema.

4. Leukopenia.

5.  Transient lactic acidosis.

6.  Hyponatremia, possibly syndrome of inappropriate antidiuretic hormone

secretion.

7.  Hypomagnesemia.

8.  Hyperbilirubinemia.

9.  Elevated prostate-specific antigen of greater than 90.

10.  Gait dysfunction.

11.  Deconditioning.

12.  Proteinuria, hematuria, bacteriuria.

13.  Axial skeleton diffuse osseous metastatic disease with abnormal

increased uptake of the thoracolumbar spine, right iliac bone, posterior

rib in the right rib, bilateral scapula, proximal femur bilaterally

including the lesser trochanter on the right and subtrochanteric region on

the left with increased uptake in the sternum.

14.  Cerebral cortical atrophy of the brain with moderate volume loss.

15.  Centrilobular and paraseptal emphysema with atelectasis scarring.

16.  Subcentimeter pulmonary nodules of 0.3 cm.

17.  Bilateral pleural effusion.

18.  Cardiomegaly.

19.  Scattered sclerotic bony lesion.

20.  Moderate diffuse stranding of the subcutaneous tissue and anasarca.

21.  Bilateral adrenal gland hypertrophy.

22.  Probable right renal cyst.

23.  Colonic diverticulosis.

24.  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

25.  Degenerative joint disease of the spine.

26.  Small fat-containing umbilical hernia.

27.  Small fat-containing inguinal hernia.

28.  Subcentimeter retroperitoneal lymphadenopathy.

29.  Dilated cardiomyopathy with left ventricular ejection fraction of 21%.

30.  Concentric left ventricular hypertrophy.

31.  Moderate-to-severely impaired left ventricular systolic function with

left ventricular ejection fraction of 21% and left ventricular global

hypokinesis.

32.  Moderately reduced right ventricular systolic function.

33.  Moderately dilated left and right atrium.

34.  Moderately thickened aortic valve.

35.  Moderately thickened mitral valve.

36.  Moderate mitral regurgitation.

37.  Moderate tricuspid regurgitation with moderate pulmonary arterial

hypertension with right ventricular systolic pressure of 52 mmHg.

38.  History of poor compliance and noncompliance.

39.  Hypervolemic hyponatremia secondary to systolic congestive heart

failure.

40.  Increased urinary retention.

41.  Possible and probable stage IV prostate carcinoma with bone metastasis

and extremely high PSA of greater than 90.

1.  Atrial fibrillation with rapid ventricular response.

2.  Noel-inferolateral coronary ischemia.

3.  History of hypertension.

4.  Most possible and most likely systolic congestive heart failure with

elevated BNP.

5.  Cardiomegaly and pulmonary vascular congestion, suggestive of

congestive heart failure.

6.  Leukopenia.

7.  Lactic acidosis.

8.  Hyponatremia, etiology undetermined.

9.  Questionable syndrome of inappropriate (excretion) of antidiuretic

hormone.

10.  Indeterminate troponin.

11.  Elevated BNP suggestive of congestive heart failure.

12.  Trace proteinuria, trace microscopic hematuria, trace bacteriuria.

13.  Bilateral lower extremity lymphedema.

14.  Anasarca.

15.  Extremely poor compliance and noncompliance.

16.  History of hypovitaminosis D.

17.  History of hyperuricemia, hypomagnesemia and history of elevated

prostate-specific antigen.



PLAN:  At this time, the patient's dobutamine has been discontinued.  IV

Lasix is discontinued.  Potassium is discontinued.  The patient was seen by

Urology, Hematology/Oncology, Nephrology, Cardiology.  Current medications:

Colace 100 mg three times a day, Ecotrin 81 mg daily, Lipitor 40 mg daily. 

The patient received IV Lopressor 5 mg x2 doses early morning because of

the rapid A fib.  The patient is on Lopressor 50 twice a day, Lovenox 40 mg

subcu daily, magnesium oxide 800 twice a day.  The patient was given

magnesium sulfate rider x1 gram, Protonix 40 mg daily.  The patient was

given tolvaptan 15 mg x1 dose by Dr. Ruiz.  The patient is on allopurinol

100 mg daily.  The patient has been ordered repeat EKG, out of bed, SCDs,

SILVIANO stocking, physical therapy, occupational therapy.  The patient was seen

by physical therapist, recommended subacute rehab.  The patient was seen by

the .



The patient is to be continued on the above therapeutic intervention.  The

patient will be monitored very closely.  We are awaiting tissue diagnosis.



Dictated and electronically signed, not read.





__________________________________________

Dale Márquez MD





DD:  02/12/2018 22:57:55

DT:  02/12/2018 23:14:04

[facility] # [id number]



GABBY

## 2018-02-13 NOTE — CP.PCM.PN
Subjective





- Date & Time of Evaluation


Date of Evaluation: 02/13/18


Time of Evaluation: 11:00





- Subjective


Subjective: 





No complaints, s/p IR bone lesion biopsy.





Objective





- Vital Signs/Intake and Output


Vital Signs (last 24 hours): 


 











Temp Pulse Resp BP Pulse Ox


 


 97.3 F L  105 H  20   97/67 L  99 


 


 02/13/18 18:00  02/13/18 18:00  02/13/18 18:00  02/13/18 18:00  02/13/18 10:47








Intake and Output: 


 











 02/13/18 02/14/18





 18:59 06:59


 


Intake Total 100 


 


Balance 100 














- Medications


Medications: 


 Current Medications





Allopurinol (Zyloprim)  300 mg PO DAILY [facility]


   Last Admin: 02/13/18 14:07 Dose:  300 mg


Aspirin (Ecotrin)  81 mg PO DAILY [facility]


   Last Admin: 02/13/18 11:37 Dose:  81 mg


Atorvastatin Calcium (Lipitor)  40 mg PO DIN [facility]


   Last Admin: 02/13/18 17:44 Dose:  40 mg


Diltiazem HCl (Cardizem)  60 mg PO TID [facility]


   Last Admin: 02/13/18 17:40 Dose:  60 mg


Docusate Sodium (Colace)  100 mg PO TID [facility]


   Last Admin: 02/13/18 17:40 Dose:  100 mg


Enoxaparin Sodium (Lovenox)  40 mg SC DAILY [facility]


   PRN Reason: Protocol


   Last Admin: 02/13/18 11:28 Dose:  40 mg


Magnesium Oxide (Mag-Ox)  800 mg PO BID [facility]


   Last Admin: 02/13/18 17:42 Dose:  800 mg


Metoprolol Tartrate (Lopressor)  50 mg PO BID [facility]


   Last Admin: 02/13/18 17:36 Dose:  Not Given


Pantoprazole Sodium (Protonix Ec Tab)  20 mg PO 0600,1600 [facility]


   Last Admin: 02/13/18 17:40 Dose:  20 mg











- Labs


Labs: 


 





 02/13/18 05:30 





 02/13/18 18:55 





 











PT  16.1 SECONDS (9.4-12.5)  H  02/12/18  04:30    


 


INR  1.39  (0.93-1.08)  H  02/12/18  04:30    


 


APTT  46.1 Seconds (25.1-36.5)  H  02/12/18  04:30    














- Head Exam


Head Exam: ATRAUMATIC





- Eye Exam


Eye Exam: Normal appearance





- ENT Exam


ENT Exam: Mucous Membranes Dry





- Respiratory Exam


Respiratory Exam: NORMAL BREATHING PATTERN





- Cardiovascular Exam


Cardiovascular Exam: +S1, +S2





- GI/Abdominal Exam


GI & Abdominal Exam: Normal Bowel Sounds





- Extremities Exam


Extremities Exam: Pedal Edema





Assessment and Plan


(1) Bone lesion


Assessment & Plan: 


s/p IR bone lesion biopsy


suspect metastatic prostate cancer


f/u path


Status: Acute   





(2) Pleural effusion


Assessment & Plan: 


reports breaths well with oxygen but short of breath without


Status: Acute   





(3) Leukopenia


Assessment & Plan: 


no neutropenia


Status: Acute   





(4) Anemia


Assessment & Plan: 


chronic disease


Status: Acute   





(5) Coagulopathy


Status: Acute

## 2018-02-13 NOTE — CP.PCM.PN
Subjective





- Date & Time of Evaluation


Date of Evaluation: 02/13/18


Time of Evaluation: 12:43





- Subjective


Subjective: 





Follow up Nephrology Consultation Note





Assessment: Stable


Hypervolemic hyponatremia likely due to CHF and ? contribution by metastatic 

cancer leading to excess ADH (SIADH)


Hypertensive Chronic Kidney Disease (I12.9)





Chronic Kidney Disease (N18.3) Stage 3 with Cr 1.2 with superimposed mild SUSSY


metastatic cancer, HTN (I12.9)


CHF LVEF 20%


Hypokalemia hypomagnesemia, hx of BPH





Plan


No acute need for renal replacement therapy at this time. 


Hypertension control with meds as ordered. not on ACEI/ARB due to SUSSY


Monitor Input/Output, daily weights and renal function with basic metabolic 

panel


supplement electrolytes as needed


since Na worsened after diuresis with lasix, will redose with tolvaptan 30 mg 

today. monitor Na q 6 hr. avoid correction in serum Na > 6-8 meq/24 hrs


should be able to resume diuretics as lasix soon once renal function stable. 

dos eof kayexylate today


Avoid nephrotoxins/NSAIDs


Glycemic control


Further work up for as per primary team





Thanks for allowing me to participate in care of your patient. Will follow 

patient with you. Please call if any Qs.


Dr Rickey Tapia


Office: [phone] Cell: [phone] Fax: [phone]





Subjective: Noted events overnight. Patients feels okay. 


Denies chest pain, palpitation, improved shortness of breath, leg swelling. 


All other negative. had bone biopsy by IR on 2/13/18





Physical Examination:      


General Appearance: Comfortable, in no acute respiratory distress, co-operative 

. 


Vitals reviewed and noted as below


Head; Atraumatic, normocephalic


ENT: no ulcers no thrush. Tongue is midline. Oropharynx: no rash or ulcers.


EYES: Pupils are equal, round and reactive to light accommodation. Eye muscles 

and extraocular movement intact. Sclera is anicteric.


Neck; supple no lymphadenopathy, no thyromegaly or bruit


Lungs: Normal respiratory rate/effort. Breath sounds bilateral decreased at 

bases


Heart: Normal rate. s1s2 normal. No rub or gallop. 


Extremities: no edema. No varicose veins


Neurological: Patient is alert, awake and oriented to person, place and time. 

No focal deficit. Strength bilateral appropriate and equal


Skin: Warm and dry. Normal turgor. No rash. Palpitation: Normal elasticity for 

age


Abdomen: Abdomen is soft. Bowel sounds +. There is no abdominal tenderness, no 

guarding/rigidity no organomegaly


Psych: normal insight and normal affect/mood


MSK: no joint tenderness or swelling. Digits and nails normal, no deformity


: kidney or bladder not palpable





Labs/imaging reviewed.


Past medical history, past surgical history, family history, social history, 

allergy reviewed and noted as below


Family hx: no hx of CKD. Rest non-contributory 





urine Na 75 urine osmol 190 Serum osmol 281 uric acid 7.9 TSH 2.3 LVEF 20%








Objective





- Vital Signs/Intake and Output


Vital Signs (last 24 hours): 


 











Temp Pulse Resp BP Pulse Ox


 


 98 F   132 H  13   118/82   99 


 


 02/13/18 10:32  02/13/18 11:29  02/13/18 10:47  02/13/18 10:47  02/13/18 10:47








Intake and Output: 


 











 02/13/18 02/13/18





 06:59 18:59


 


Intake Total 240 100


 


Output Total 800 


 


Balance -560 100














- Medications


Medications: 


 Current Medications





Allopurinol (Zyloprim)  300 mg PO DAILY [facility]


   Last Admin: 02/12/18 10:54 Dose:  300 mg


Aspirin (Ecotrin)  81 mg PO DAILY [facility]


   Last Admin: 02/13/18 11:37 Dose:  81 mg


Atorvastatin Calcium (Lipitor)  40 mg PO DIN [facility]


   Last Admin: 02/12/18 17:30 Dose:  40 mg


Diltiazem HCl (Cardizem)  60 mg PO TID [facility]


Docusate Sodium (Colace)  100 mg PO TID [facility]


   Last Admin: 02/12/18 17:30 Dose:  100 mg


Enoxaparin Sodium (Lovenox)  40 mg SC DAILY [facility]


   PRN Reason: Protocol


   Last Admin: 02/13/18 11:28 Dose:  40 mg


Magnesium Oxide (Mag-Ox)  800 mg PO BID [facility]


   Last Admin: 02/13/18 11:30 Dose:  800 mg


Metoprolol Tartrate (Lopressor)  50 mg PO BID [facility]


   Last Admin: 02/13/18 11:29 Dose:  50 mg


Pantoprazole Sodium (Protonix Ec Tab)  20 mg PO 0600,1600 [facility]


   Last Admin: 02/13/18 06:56 Dose:  Not Given











- Labs


Labs: 


 





 02/13/18 05:30 





 02/13/18 05:30 





 











PT  16.1 SECONDS (9.4-12.5)  H  02/12/18  04:30    


 


INR  1.39  (0.93-1.08)  H  02/12/18  04:30    


 


APTT  46.1 Seconds (25.1-36.5)  H  02/12/18  04:30

## 2018-02-13 NOTE — CT
PROCEDURE:  CT guided L2 vertebral body biopsy.



HISTORY:

Sclerotic lesion L2 vertebral body.  Elevated PSA.  Evaluate for 

prostate CA 



PHYSICIAN(S):  Adam Shine MD.



TECHNIQUE:

The relative risks and indications of the procedure were explained to 

the patient and consent obtained. The patient was placed prone on the 

CT scanner and preliminary images through the upper lumbar spine 

obtained. Conscious sedation and monitoring were provided throughout 

the procedure by a nurse.



Is a mixed sclerotic lytic lesion in the L2 vertebral body.. A right 

posterior paraspinal approach was selected and the area prepped and 

draped in the usual sterile fashion. 1% Xylocaine was used to 

anesthetize the skin and soft tissues. A on control bone biopsy 

needle was advanced to L2 vertebral body and its position confirmed 

with CT. Two long core biopsies were obtained through the L2 

vertebral body with on control needle. The patient tolerated the 

procedure well. 



IMPRESSION:

1. CT-guided L2 vertebral body biopsy as described above.

## 2018-02-13 NOTE — PN
DATE:  02/13/2018



CARDIOLOGY FOLLOWUP



SUBJECTIVE:  The patient is in bed.  He is comfortable without shortness of

breath, without chest pain.



PHYSICAL EXAMINATION:

VITAL SIGNS:  Blood pressure is 118/82, heart rate is atrial fibrillation

in the 90s.

NECK:  Negative JVD.

LUNGS:  Without rales.

HEART:  With S1, S2.

EXTREMITIES:  Without edema.



LABORATORY DATA:  Hemoglobin is 13.7.  Chemistries:  BUN and creatinine 16

and 1.5.



IMPRESSION:

1.  Atrial fibrillation.

2.  Hypertension.

3.  Prostate cancer.

4.  Hypercholesterolemia.



PLAN:  Given these findings, the patient is currently on a beta blocker for

better heart rate control.  The patient will need long-term

anticoagulation.



We will add Cardizem to his regimen for better heart rate control.





__________________________________________

Adam Farah MD





DD:  02/13/2018 11:28:21

DT:  02/13/2018 11:31:38

Job # [id number]

## 2018-02-13 NOTE — CP.PCM.PN
<Brian Gasca - Last Filed: 02/13/18 11:07>





Subjective





- Date & Time of Evaluation


Date of Evaluation: 02/13/18


Time of Evaluation: 11:07





- Subjective


Subjective: 





Patient seen and examined at bedside in PACU. Patient with no acute events 

overnight. Patient made NPO overnight for procedures this morning. Patient with 

no complaints after procedure, although he is still lethargic from sedation. 

Denies chest pain, shortness of breath, nausea, vomiting, diarrhea, fever, 

chills. 





Objective





- Vital Signs/Intake and Output


Vital Signs (last 24 hours): 


 











Temp Pulse Resp BP Pulse Ox


 


 98 F   108 H  13   118/82   99 


 


 02/13/18 10:32  02/13/18 10:47  02/13/18 10:47  02/13/18 10:47  02/13/18 10:47








Intake and Output: 


 











 02/13/18 02/13/18





 06:59 18:59


 


Intake Total 240 100


 


Output Total 800 


 


Balance -560 100














- Medications


Medications: 


 Current Medications





Allopurinol (Zyloprim)  300 mg PO DAILY [facility]


   Last Admin: 02/12/18 10:54 Dose:  300 mg


Aspirin (Ecotrin)  81 mg PO DAILY [facility]


   Last Admin: 02/12/18 10:13 Dose:  81 mg


Atorvastatin Calcium (Lipitor)  40 mg PO DIN [facility]


   Last Admin: 02/12/18 17:30 Dose:  40 mg


Docusate Sodium (Colace)  100 mg PO TID [facility]


   Last Admin: 02/12/18 17:30 Dose:  100 mg


Enoxaparin Sodium (Lovenox)  40 mg SC DAILY [facility]


   PRN Reason: Protocol


   Last Admin: 02/12/18 10:14 Dose:  40 mg


Magnesium Oxide (Mag-Ox)  800 mg PO BID [facility]


   Last Admin: 02/12/18 17:30 Dose:  800 mg


Metoprolol Tartrate (Lopressor)  50 mg PO BID [facility]


   Last Admin: 02/12/18 17:26 Dose:  Not Given


Pantoprazole Sodium (Protonix Ec Tab)  20 mg PO 0600,1600 [facility]


   Last Admin: 02/13/18 06:56 Dose:  Not Given











- Labs


Labs: 


 





 02/13/18 05:30 





 02/13/18 05:30 





 











PT  16.1 SECONDS (9.4-12.5)  H  02/12/18  04:30    


 


INR  1.39  (0.93-1.08)  H  02/12/18  04:30    


 


APTT  46.1 Seconds (25.1-36.5)  H  02/12/18  04:30    














- Constitutional


Appears: Non-toxic, No Acute Distress





- Head Exam


Head Exam: ATRAUMATIC, NORMAL INSPECTION, NORMOCEPHALIC





- ENT Exam


ENT Exam: Mucous Membranes Moist, Normal Exam





- Respiratory Exam


Respiratory Exam: Clear to Ausculation Bilateral, NORMAL BREATHING PATTERN





- Cardiovascular Exam


Cardiovascular Exam: Tachycardia, Irregular Rhythm, +S1, +S2





- GI/Abdominal Exam


GI & Abdominal Exam: Soft, Normal Bowel Sounds.  absent: Tenderness





- Extremities Exam


Extremities Exam: Pedal Edema (Trace b/l).  absent: Calf Tenderness





- Neurological Exam


Neurological Exam: Alert, Awake





- Psychiatric Exam


Psychiatric exam: Normal Affect, Normal Mood





- Skin


Skin Exam: Dry, Normal Color, Warm





Assessment and Plan





- Assessment and Plan (Free Text)


Plan: 





76 y/o male with past medical history of CHF and A-fib initially presenting for 

A-fib with RVR and found to have elevated PSA. Patient subsequently had a bone 

scan performed which showed multiple metastatic lesions of the axial skeleton. 

Patient is currently being followed by Oncology, Nephrology, Urology, and IR. 

Patient had biopsy and of spine this morning and cystoscopy. We will await 

pathology results and await recommendations as per oncology. He remains 

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 Patient will receive 1 dose 

of Tolvaptan. Continue current medical regimen. We will continue to follow 

closely and await recommendations from consultants. 





Campos, PGY-2





<Dale Márquez U - Last Filed: 02/23/18 16:30>





Objective





- Vital Signs/Intake and Output


Vital Signs (last 24 hours): 


 











Temp Pulse Resp BP Pulse Ox


 


 97.5 F L  81   18   145/96 H  96 


 


 02/20/18 07:00  02/20/18 09:25  02/20/18 09:00  02/20/18 09:26  02/20/18 09:00











- Labs


Labs: 


 





 02/19/18 07:00 





 02/20/18 11:15 





 











PT  16.7 SECONDS (9.4-12.5)  H  02/19/18  07:00    


 


INR  1.44  (0.93-1.08)  H  02/19/18  07:00    


 


APTT  43.5 Seconds (25.1-36.5)  H  02/19/18  07:00    














Attending/Attestation





- Attestation


I have personally seen and examined this patient.: Yes


I have fully participated in the care of the patient.: Yes


I have reviewed all pertinent clinical information, including history, physical 

exam and plan: Yes


Notes (Text): 


Please see/read my dictated notes.

## 2018-02-13 NOTE — PN
DATE:  02/13/2018



SUBJECTIVE:  The patient was seen in recovery room after having a CT-guided

biopsy.  The patient is seen lying in the recovery room.  The patient is

arousable, sedated.



PHYSICAL EXAMINATION:

VITAL SIGNS:  T-max 98.  Telemetry shows atrial fibrillation, heart rate in

low 100s, blood pressure 125/77, respirations 13-18, O2 sat %. 

Yesterday's output 1400.

HEENT:  Head examination normocephalic, atraumatic.  HEENT examination

shows pinkish conjunctivae.  Anicteric sclerae.  No oropharyngeal lesion. 

No neck rigidity.  No jugular venous distention.

CHEST:  Kyphosis.

LUNGS:  Shows decreased breath sound at the bases.  No rales or crackles

today.

CARDIOVASCULAR:  S1, S2.  Irregular rhythm.

ABDOMEN:  Soft.  Positive bowel sound.

GENITALIA:  Male.

RECTAL:  Deferred.

EXTREMITY:  Shows no pitting edema, no calf numbness, no Quincy's signs of

the legs.  No swelling and edema of the feet noted.  Positive SCDs noted.

MUSCULOSKELETAL:  Shows a body mass index of 28.

NEUROLOGIC:  The patient was sedated, arousable.



DIAGNOSTICS:  On 02/13/2018, WBC 4.8, hemoglobin and hematocrit 13.7 and

41.5, platelets 140.  Sodium is down to 126, potassium is 5.4, chloride 88,

CO2 of 25, anion gap 18, BUN 16, creatinine 1.5, GFR 55, glucose 98,

calcium 9.5, magnesium 2.0, total bili 1.6, direct bili 1.0.  LFTs are

normal.



The patient underwent a CT-guided biopsy of the lumbar spine.  EKG done in

the emergency room.



EKG from today shows atrial fibrillation, heart rate in 130s.



IMPRESSION AND PLAN:

1.  Atrial fibrillation with rapid ventricular response.

2.  Transient hypotension (resolved).

3.  Leukopenia, anemia.

4.  Transient lactic acidosis.

5.  Refractory hyponatremia.

6.  Hyperkalemia.

7.  Hypomagnesemia.

8.  Hyperbilirubinemia.

9.  Most likely suspected metastatic prostate carcinoma with bone

metastasis with elevated PSA.

10.  Trace proteinuria, microscopic hematuria.

11.  Status post CT-guided lumbar spine biopsy.

12.  Atrial fibrillation with rapid ventricular response.

13.  Voiding dysfunction.

14.  Gait dysfunction.

15.  Systolic congestive heart failure (resolving).

16.  Constipation.

17.  Dyslipidemia.

18.  Hypomagnesemia.

19.  Hyperuricemia.

20.  Deconditioning.

1.  Atrial fibrillation with rapid ventricular response.

2.  Suspect metastatic prostate carcinoma with bone metastasis.

3.  Pleural effusion.

4.  Leukopenia.

5.  Anemia.

6.  Refractory hyponatremia.

7.  Hypervolemic hyponatremia secondary to congestive heart failure.

8.  Possible syndrome of inappropriate antidiuretic hormone.

9.  Hypertensive chronic kidney disease.

10.  Systolic congestive heart failure with cardiomyopathy.

11.  Hypokalemia.

12.  Hypomagnesemia.

13.  Normocytic anemia.

14.  Granulocytosis.

15.  Transient lactic acidosis.

16.  Hypokalemia.

17.  Hyponatremia.

18.  Hypomagnesemia.

19.  Hyperbilirubinemia.

20.  Transient hypotension.

21.  Constipation.

22.  Hypercholesteremia.

23.  Hypomagnesemia.

24.  Hyperuricemia.

1.  New-onset atrial fibrillation with rapid ventricular response.

2.  Dilated cardiomyopathy.

3.  Acute systolic congestive heart failure with elevated brain natriuretic

peptide.

4.  Hyponatremia.

5.  Questionable syndrome of inappropriate antidiuretic hormone secretion.

6.  Most probable metastatic prostate carcinoma with multiple bone

metastases.

7.  Leukopenia and anemia.

8.  Transient lactic acidosis.

9.  Hypokalemia.

10.  Hyperbilirubinemia.

11.  Elevated prostate-specific antigen of greater than 90.

12.  Proteinuria, microscopic hematuria.

13.  Moderate pulmonary arterial hypertension with moderate tricuspid

regurgitation and elevated right ventricular systolic pressure of 52 mmHg.

14.  Bilateral lower extremity lymphedema and venous stasis, resolving.

15.  Lateral coronary ischemic changes.

16.  Constipation.

17.  Hypercholesteremia.

18.  Hypomagnesemia.

19.  Hyperuricemia.

1.  Atrial fibrillation with rapid ventricular response.

2.  Dilated cardiomyopathy with ejection fraction of 21%.

3.  Concentric left ventricular hypertrophy.

4.  Moderate to severely impaired left ventricular function with left

ventricle ejection fraction of 21%.

5.  Global left ventricular hypokinesis.

6.  Moderately reduced right ventricular systolic function.

7.  Moderately dilated left and right atrium.

8.  Moderately thickened aortic valve.

9.  Moderately thickened mitral valve with moderate mitral regurgitation.

10.  Moderate tricuspid regurgitation with moderate pulmonary arterial

hypertension with right ventricular systolic pressure of 52 mmHg.

11.  Atrial fibrillation with rapid ventricular response.

12.  Gait dysfunction.

13.  Bilateral lower extremity lymphedema and venous stasis.

14.  Leukopenia.

15.  Anemia.

16.  Lactic acidosis.

17.  Hypokalemia.

18.  Hyperbilirubinemia.

19.  Elevated prostate-specific antigen.

20.  Hyperbilirubinemia.

21.  Hyponatremia.

22.  Possible syndrome of inappropriate antidiuretic hormone.

23.  Systolic congestive heart failure with elevated BNP.

24.  Systolic and diastolic congestive heart failure with elevated BNP,

decreased left ventricular ejection fraction of 21% and moderate pulmonary

arterial hypertension, moderate tricuspid regurgitation.

1.  Hypertension.

2.  Atrial fibrillation with rapid ventricular response.

3.  Acute systolic and diastolic congestive heart failure with decreased

left ventricular ejection fraction of 20% and abnormal weight gain and

bilateral lower extremity lymphedema.

4. Leukopenia.

5.  Transient lactic acidosis.

6.  Hyponatremia, possibly syndrome of inappropriate antidiuretic hormone

secretion.

7.  Hypomagnesemia.

8.  Hyperbilirubinemia.

9.  Elevated prostate-specific antigen of greater than 90.

10.  Gait dysfunction.

11.  Deconditioning.

12.  Proteinuria, hematuria, bacteriuria.

13.  Axial skeleton diffuse osseous metastatic disease with abnormal

increased uptake of the thoracolumbar spine, right iliac bone, posterior

rib in the right rib, bilateral scapula, proximal femur bilaterally

including the lesser trochanter on the right and subtrochanteric region on

the left with increased uptake in the sternum.

14.  Cerebral cortical atrophy of the brain with moderate volume loss.

15.  Centrilobular and paraseptal emphysema with atelectasis scarring.

16.  Subcentimeter pulmonary nodules of 0.3 cm.

17.  Bilateral pleural effusion.

18.  Cardiomegaly.

19.  Scattered sclerotic bony lesion.

20.  Moderate diffuse stranding of the subcutaneous tissue and anasarca.

21.  Bilateral adrenal gland hypertrophy.

22.  Probable right renal cyst.

23.  Colonic diverticulosis.

24.  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

25.  Degenerative joint disease of the spine.

26.  Small fat-containing umbilical hernia.

27.  Small fat-containing inguinal hernia.

28.  Subcentimeter retroperitoneal lymphadenopathy.

29.  Dilated cardiomyopathy with left ventricular ejection fraction of 21%.

30.  Concentric left ventricular hypertrophy.

31.  Moderate-to-severely impaired left ventricular systolic function with

left ventricular ejection fraction of 21% and left ventricular global

hypokinesis.

32.  Moderately reduced right ventricular systolic function.

33.  Moderately dilated left and right atrium.

34.  Moderately thickened aortic valve.

35.  Moderately thickened mitral valve.

36.  Moderate mitral regurgitation.

37.  Moderate tricuspid regurgitation with moderate pulmonary arterial

hypertension with right ventricular systolic pressure of 52 mmHg.

38.  History of poor compliance and noncompliance.

39.  Hypervolemic hyponatremia secondary to systolic congestive heart

failure.

40.  Increased urinary retention.

41.  Possible and probable stage IV prostate carcinoma with bone metastasis

and extremely high PSA of greater than 90.

1.  Atrial fibrillation with rapid ventricular response.

2.  Noel-inferolateral coronary ischemia.

3.  History of hypertension.

4.  Most possible and most likely systolic congestive heart failure with

elevated BNP.

5.  Cardiomegaly and pulmonary vascular congestion, suggestive of

congestive heart failure.

6.  Leukopenia.

7.  Lactic acidosis.

8.  Hyponatremia, etiology undetermined.

9.  Questionable syndrome of inappropriate (excretion) of antidiuretic

hormone.

10.  Indeterminate troponin.

11.  Elevated BNP suggestive of congestive heart failure.

12.  Trace proteinuria, trace microscopic hematuria, trace bacteriuria.

13.  Bilateral lower extremity lymphedema.

14.  Anasarca.

15.  Extremely poor compliance and noncompliance.

16.  History of hypovitaminosis D.

17.  History of hyperuricemia, hypomagnesemia and history of elevated

prostate-specific antigen.



PLAN:  At this time, the patient will be continued on serial labs.



CURRENT MEDICATIONS:

1.  Colace 100 mg three times a day.

2.  Ecotrin 81 mg daily.

3.  The patient has been given a dose of Kayexalate 15 g by Nephrology.

4.  Lipitor 40 mg daily.

5.  Lopressor 50 mg twice a day.

6.  Lovenox 40 mg subcu daily.

7.  Magnesium oxide 800 mg twice a day.

8.  The patient is given a dose of tolvaptan 30 mg.

9.  Allopurinol 300 mg daily.



Oxygen, out of bed, SCDs, SILVIANO stocking, physical therapy, occupational

therapy.



The patient was seen by physical therapist.  The recommendations are

subacute rehab.



Dictated and electronically signed, not read.





__________________________________________

Dale Márquez MD





DD:  02/13/2018 10:45:03

DT:  02/13/2018 10:52:55

Job # [id number]



[facility]

## 2018-02-13 NOTE — CON
DATE:  2018



UROLOGY CONSULTATION



REASON FOR CONSULTATION:  Elevated PSA.



HISTORY OF PRESENT ILLNESS:  Mr. Green is a pleasant gentleman.  He is 77

years old.  He is under the care of Dr. Dale Márquez and he is in the

hospital for management of atrial fibrillation.



The consultation was for an elevation in serum PSA, see the labs listed

below.



In the interim from the consultation last night until this morning, the

patient also has an indwelling Fraire catheter with hematuria as noted.



The patient reports he has some baseline voiding dysfunction, irritative

and obstructive urinary complaints.



The patient is more comfortable with the catheter and he was in retention

before the catheter was inserted, the catheter was inserted with a residual

of greater than 300 mL.



The patient felt relieved.



_____ noted, otherwise unremarkable.



Past medical, surgical, and the current medical history, all listed in the

chart.



From Urology standpoint, the patient is with AFib.  Presumably starting

some blood thinner soon pending Urology workup.  See the plans listed

below.



MEDICATIONS:  See chart.



ALLERGIES:  _____



SOCIAL HISTORY:  Essentially unremarkable.



PHYSICAL EXAMINATION:

GENERAL:  A well nourished male, in no apparent distress.

VITAL SIGNS:  As noted.

ABDOMEN:  Relatively soft.  Fraire catheter. Otherwise unremarkable.

RECTAL:  Deferred at this time.



LABORATORY DATA:  See chart.  BUN, creatinine, and PSA all noted.



ASSESSMENT:

1.  Elevation in serum PSA.

2.  Hematuria.

3.  Urinary retention and voiding dysfunction.



PLAN:  From Urology standpoint, for now, we will leave the Fraire catheter

with irrigation p.r.n.



In terms of blood thinner use with medications such as Coumadin, _____

discuss the use of this and the timing for Urology workup.



From Urology workup, he needs the followin.  He needs a prostate ultrasound and prostate biopsy.

2.  He also needs a cystoscopy and retrograde pyelogram.

3.  A CT scan of the abdomen and pelvis without and with IV contrast.



Once all these tests are done, then we can decide what to do further for

further management.  We need to rule out any underlying malignancy, whether

prostate cancer, bladder cancer, kidney cancer, or the like and also going

to work up voiding dysfunction and urinary retention.



So, in the interim, the plan is as follows:

1.  Maintain the current Fraire.

2.  When medically stable, we will plan for a prostate ultrasound and

prostate ultrasound guided biopsy.

3.  We will arrange for a CT scan.

4.  We will arrange for a cystoscopy when medically cleared.





__________________________________________

Kevin Gray MD



DD:  2018 8:48:34

DT:  2018 11:05:24

Job # [id number]

## 2018-02-13 NOTE — CARD
--------------- APPROVED REPORT --------------





EKG Measurement

Heart [id number]

[id number]

[id number]

[id number]



<Conclusion>

Atrial fibrillation with rapid ventricular response

PRWP

Low voltage QRS limb leads

Nonspecific T wave abnormality

No change except the rate is faster

## 2018-02-14 LAB
ALBUMIN SERPL-MCNC: 3.7 G/DL (ref 3–4.8)
ALBUMIN/GLOB SERPL: 1.2 {RATIO} (ref 1.1–1.8)
ALT SERPL-CCNC: 38 U/L (ref 7–56)
AST SERPL-CCNC: 62 U/L (ref 17–59)
BASOPHILS # BLD AUTO: 0.03 K/MM3 (ref 0–2)
BASOPHILS NFR BLD: 0.6 % (ref 0–3)
BILIRUB DIRECT SERPL-MCNC: 1.1 MG/DL (ref 0–0.4)
BUN SERPL-MCNC: 19 MG/DL (ref 7–21)
CALCIUM SERPL-MCNC: 9.4 MG/DL (ref 8.4–10.5)
EOSINOPHIL # BLD: 0.1 10*3/UL (ref 0–0.7)
EOSINOPHIL NFR BLD: 1.7 % (ref 1.5–5)
ERYTHROCYTE [DISTWIDTH] IN BLOOD BY AUTOMATED COUNT: 15.1 % (ref 11.5–14.5)
GFR NON-AFRICAN AMERICAN: 39
GRANULOCYTES # BLD: 3.43 10*3/UL (ref 1.4–6.5)
GRANULOCYTES NFR BLD: 66.5 % (ref 50–68)
HGB BLD-MCNC: 13.3 G/DL (ref 14–18)
LYMPHOCYTES # BLD: 1 10*3/UL (ref 1.2–3.4)
LYMPHOCYTES NFR BLD AUTO: 19.4 % (ref 22–35)
MAGNESIUM SERPL-MCNC: 2.1 MG/DL (ref 1.7–2.2)
MCH RBC QN AUTO: 28.1 PG (ref 25–35)
MCHC RBC AUTO-ENTMCNC: 32.8 G/DL (ref 31–37)
MCV RBC AUTO: 85.4 FL (ref 80–105)
MONOCYTES # BLD AUTO: 0.6 10*3/UL (ref 0.1–0.6)
MONOCYTES NFR BLD: 11.8 % (ref 1–6)
PLATELET # BLD: 136 10^3/UL (ref 120–450)
PMV BLD AUTO: 9.7 FL (ref 7–11)
RBC # BLD AUTO: 4.74 10^6/UL (ref 3.5–6.1)
WBC # BLD AUTO: 5.2 10^3/UL (ref 4.5–11)

## 2018-02-14 PROCEDURE — 0TJB8ZZ INSPECTION OF BLADDER, VIA NATURAL OR ARTIFICIAL OPENING ENDOSCOPIC: ICD-10-PCS

## 2018-02-14 RX ADMIN — ENOXAPARIN SODIUM SCH: 40 INJECTION SUBCUTANEOUS at 10:00

## 2018-02-14 RX ADMIN — SODIUM POLYSTYRENE SULFONATE ONE: 15 SUSPENSION ORAL; RECTAL at 09:45

## 2018-02-14 RX ADMIN — PANTOPRAZOLE SODIUM SCH MG: 20 TABLET, DELAYED RELEASE ORAL at 17:15

## 2018-02-14 RX ADMIN — Medication SCH: at 11:59

## 2018-02-14 RX ADMIN — ENOXAPARIN SODIUM SCH MG: 40 INJECTION SUBCUTANEOUS at 13:36

## 2018-02-14 RX ADMIN — Medication SCH MG: at 18:15

## 2018-02-14 RX ADMIN — PANTOPRAZOLE SODIUM SCH MG: 20 TABLET, DELAYED RELEASE ORAL at 05:10

## 2018-02-14 RX ADMIN — SODIUM POLYSTYRENE SULFONATE ONE GM: 15 SUSPENSION ORAL; RECTAL at 13:26

## 2018-02-14 NOTE — CP.PCM.PN
Subjective





- Date & Time of Evaluation


Date of Evaluation: 02/14/18


Time of Evaluation: 18:15





- Subjective


Subjective: 





No complaints.





Objective





- Vital Signs/Intake and Output


Vital Signs (last 24 hours): 


 











Temp Pulse Resp BP Pulse Ox


 


 97.3 F L  84   18   109/66   100 


 


 02/14/18 17:28  02/14/18 20:36  02/14/18 17:28  02/14/18 18:14  02/14/18 11:50








Intake and Output: 


 











 02/14/18 02/15/18





 18:59 06:59


 


Intake Total 180 


 


Output Total 775 


 


Balance -595 














- Medications


Medications: 


 Current Medications





Allopurinol (Zyloprim)  300 mg PO DAILY [facility]


   Last Admin: 02/14/18 13:37 Dose:  300 mg


Aspirin (Ecotrin)  81 mg PO DAILY [facility]


   Last Admin: 02/14/18 13:37 Dose:  81 mg


Atorvastatin Calcium (Lipitor)  40 mg PO DIN [facility]


   Last Admin: 02/14/18 18:00 Dose:  40 mg


Diltiazem HCl (Cardizem Cd)  180 mg PO DAILY [facility]


   Last Admin: 02/14/18 09:42 Dose:  180 mg


Docusate Sodium (Colace)  100 mg PO TID [facility]


   Last Admin: 02/14/18 18:14 Dose:  100 mg


Enoxaparin Sodium (Lovenox)  40 mg SC DAILY [facility]


   PRN Reason: Protocol


   Last Admin: 02/14/18 13:36 Dose:  40 mg


Magnesium Oxide (Mag-Ox)  800 mg PO BID [facility]


   Last Admin: 02/14/18 18:15 Dose:  800 mg


Metoprolol Tartrate (Lopressor)  50 mg PO BID [facility]


   Last Admin: 02/14/18 18:14 Dose:  50 mg


Ondansetron HCl (Zofran Inj)  4 mg IVP ONCE PRN


   PRN Reason: Nausea/Vomiting


Pantoprazole Sodium (Protonix Ec Tab)  20 mg PO 0600,1600 [facility]


   Last Admin: 02/14/18 17:15 Dose:  20 mg


Tolvaptan (Samsca)  30 mg PO DAILY [facility]


   Stop: 02/16/18 09:45


   Last Admin: 02/14/18 13:38 Dose:  30 mg











- Labs


Labs: 


 





 02/14/18 06:00 





 02/14/18 06:00 





 











PT  16.1 SECONDS (9.4-12.5)  H  02/12/18  04:30    


 


INR  1.39  (0.93-1.08)  H  02/12/18  04:30    


 


APTT  46.1 Seconds (25.1-36.5)  H  02/12/18  04:30    














- Head Exam


Head Exam: ATRAUMATIC





- Eye Exam


Eye Exam: Normal appearance





- ENT Exam


ENT Exam: Mucous Membranes Dry





- Respiratory Exam


Respiratory Exam: NORMAL BREATHING PATTERN





- Cardiovascular Exam


Cardiovascular Exam: +S1, +S2





- GI/Abdominal Exam


GI & Abdominal Exam: Normal Bowel Sounds





Assessment and Plan


(1) Bone lesion


Assessment & Plan: 


s/p IR biopsy


? metastatic prostate cancer


f/u path


Status: Acute   





(2) Pleural effusion


Assessment & Plan: 


comfortable at rest and with 02


Status: Acute   





(3) Anemia


Assessment & Plan: 


chronic disease, bone mets


Status: Acute

## 2018-02-14 NOTE — PN
DATE:  02/11/2018.



DAILY PROGRESS NOTE



SUBJECTIVE:  Please see the briefly dictated note from 02/09/2018.  He had

a Urology consult at that time for hematuria, urinary retention, voiding

dysfunction, elevated PSA and this is a progress note and today is

02/11/2018.  The patient is currently resting comfortably in his bed.



There is no past medical and surgical history, no changes.



MEDICATIONS:  As before.  The patient is on Lovenox.



His physical exam remains unchanged.



The catheter is relatively clear.



DIAGNOSES:  Elevated PSA, voiding dysfunction, urinary retention and

hematuria.



PLAN:  I would likely recommend a cystoscopy and retrograde pyelogram,

possible biopsy depending on what we find.



The timing on this will be up to Dr. Márquez and the Medical and the

Cardiology team.



In regards to his prostate ultrasound and prostate ultrasound-guided

biopsy, the same thing, we just need for medical clearance and to see what

we can do in terms of blood thinner.



If it is conceivable and possible we would try to do it this week, maybe on

02/13/2018 or 02/14/2018, depending on the patient's health and medical

status and willingness and medical clearance.



The other possibility is to use the anticoagulants and then _____ time

later.



We can discuss all this with Dr. Márquez.



Further plans, we will follow depending on what the medical plan will be,

but from Urology standpoint, the following tests needs to be done at some

point in the future, prostate ultrasound, prostate ultrasound-guided biopsy

and cystoscopy and retrograde pyelogram.  The timing of this to be

determined.





__________________________________________

Kevin Gray MD





DD:  02/12/2018 8:51:22

DT:  02/12/2018 9:54:17

Job # [id number]

## 2018-02-14 NOTE — PN
DATE:  02/14/2018



CARDIOLOGY FOLLOWUP



SUBJECTIVE:  The patient is resting comfortably.



PHYSICAL EXAMINATION:

VITAL SIGNS:  Blood pressure is 112/72, heart rates in the 80s, atrial

fibrillation.

NECK:  Negative JVD.

LUNGS:  Without rales.

HEART:  With S1, S2.

Extremities:  Without edema.



LABORATORY DATA:  BUN and creatinine are 19 and 1.7.  Hemoglobin is 13.3.



IMPRESSION:

1.  Atrial fibrillation.

2.  Prostate cancer.

3.  Renal insufficiency.

4.  Hypertension.

5.  Hypercholesterolemia.



PLAN:  Given these findings, the patient's heart rate is well-controlled. 

The patient is for urologic procedure today.  We will change his

short-acting Cardizem to long-acting Cardizem.





__________________________________________

Adam Farah MD





DD:  02/14/2018 8:05:02

DT:  02/14/2018 8:09:27

Job # [id number]

## 2018-02-14 NOTE — CP.PCM.PN
Subjective





- Date & Time of Evaluation


Date of Evaluation: 02/14/18


Time of Evaluation: 13:58





- Subjective


Subjective: 





Follow up Nephrology Consultation Note





Assessment: Stable


Hypervolemic hyponatremia likely due to CHF and ? contribution by metastatic 

cancer leading to excess ADH (SIADH)


Hypertensive Chronic Kidney Disease (I12.9)





Chronic Kidney Disease (N18.3) Stage 3 with Cr 1.2 with superimposed mild SUSSY


metastatic cancer, HTN (I12.9)


CHF LVEF 20%


Hypokalemia hypomagnesemia, hx of BPH





Plan


No acute need for renal replacement therapy at this time. 


Hypertension control with meds as ordered. not on ACEI/ARB due to SUSSY


Monitor Input/Output, daily weights and renal function with basic metabolic 

panel


supplement electrolytes as needed


continue with tolvaptan 30 mg. monitor Na q 6 hr. avoid correction in serum Na 

> 6-8 meq/24 hrs


should be able to resume diuretics as lasix soon once renal function stable. 

dose of kayexylate today


Avoid nephrotoxins/NSAIDs


Glycemic control


Further work up for as per primary team





Thanks for allowing me to participate in care of your patient. Will follow 

patient with you. Please call if any Qs.


Dr Rickey Tapia


Office: [phone] Cell: [phone] Fax: [phone]





Subjective: Noted events overnight. Patients feels okay. 


Denies chest pain, palpitation, improved shortness of breath, leg swelling. 


All other negative. had bone biopsy by IR on 2/13/18





Physical Examination:      


General Appearance: Comfortable, in no acute respiratory distress, co-operative 

. 


Vitals reviewed and noted as below


Head; Atraumatic, normocephalic


ENT: no ulcers no thrush. Tongue is midline. Oropharynx: no rash or ulcers.


EYES: Pupils are equal, round and reactive to light accommodation. Eye muscles 

and extraocular movement intact. Sclera is anicteric.


Neck; supple no lymphadenopathy, no thyromegaly or bruit


Lungs: Normal respiratory rate/effort. Breath sounds bilateral decreased at 

bases


Heart: Normal rate. s1s2 normal. No rub or gallop. 


Extremities: no edema. No varicose veins


Neurological: Patient is alert, awake and oriented to person, place and time. 

No focal deficit. Strength bilateral appropriate and equal


Skin: Warm and dry. Normal turgor. No rash. Palpitation: Normal elasticity for 

age


Abdomen: Abdomen is soft. Bowel sounds +. There is no abdominal tenderness, no 

guarding/rigidity no organomegaly


Psych: normal insight and normal affect/mood


MSK: no joint tenderness or swelling. Digits and nails normal, no deformity


: kidney or bladder not palpable





Labs/imaging reviewed.


Past medical history, past surgical history, family history, social history, 

allergy reviewed and noted as below


Family hx: no hx of CKD. Rest non-contributory 





urine Na 75 urine osmol 190 Serum osmol 281 uric acid 7.9 TSH 2.3 LVEF 20%





Objective





- Vital Signs/Intake and Output


Vital Signs (last 24 hours): 


 











Temp Pulse Resp BP Pulse Ox


 


 98.4 F   91 H  17   101/71   100 


 


 02/14/18 11:50  02/14/18 11:50  02/14/18 11:50  02/14/18 11:50  02/14/18 11:50








Intake and Output: 


 











 02/14/18 02/14/18





 06:59 18:59


 


Intake Total 120 


 


Output Total 1000 


 


Balance -880 














- Medications


Medications: 


 Current Medications





Allopurinol (Zyloprim)  300 mg PO DAILY [facility]


   Last Admin: 02/14/18 13:37 Dose:  300 mg


Aspirin (Ecotrin)  81 mg PO DAILY [facility]


   Last Admin: 02/14/18 13:37 Dose:  81 mg


Atorvastatin Calcium (Lipitor)  40 mg PO DIN [facility]


   Last Admin: 02/13/18 17:44 Dose:  40 mg


Diltiazem HCl (Cardizem Cd)  180 mg PO DAILY [facility]


   Last Admin: 02/14/18 09:42 Dose:  180 mg


Docusate Sodium (Colace)  100 mg PO TID [facility]


   Last Admin: 02/14/18 13:36 Dose:  100 mg


Enoxaparin Sodium (Lovenox)  40 mg SC DAILY [facility]


   PRN Reason: Protocol


   Last Admin: 02/14/18 13:36 Dose:  40 mg


Magnesium Oxide (Mag-Ox)  800 mg PO BID [facility]


   Last Admin: 02/14/18 11:59 Dose:  Not Given


Metoprolol Tartrate (Lopressor)  50 mg PO BID [facility]


   Last Admin: 02/14/18 09:42 Dose:  50 mg


Ondansetron HCl (Zofran Inj)  4 mg IVP ONCE [facility]


   PRN Reason: Nausea/Vomiting


Pantoprazole Sodium (Protonix Ec Tab)  20 mg PO 0600,1600 [facility]


   Last Admin: 02/14/18 05:10 Dose:  20 mg


Tolvaptan (Samsca)  30 mg PO DAILY [facility]


   Stop: 02/16/18 09:45


   Last Admin: 02/14/18 13:38 Dose:  30 mg











- Labs


Labs: 


 





 02/14/18 06:00 





 02/14/18 06:00 





 











PT  16.1 SECONDS (9.4-12.5)  H  02/12/18  04:30    


 


INR  1.39  (0.93-1.08)  H  02/12/18  04:30    


 


APTT  46.1 Seconds (25.1-36.5)  H  02/12/18  04:30

## 2018-02-15 LAB
ALBUMIN SERPL-MCNC: 3.4 G/DL (ref 3–4.8)
ALBUMIN/GLOB SERPL: 1.1 {RATIO} (ref 1.1–1.8)
ALT SERPL-CCNC: 41 U/L (ref 7–56)
AST SERPL-CCNC: 59 U/L (ref 17–59)
BASOPHILS # BLD AUTO: 0.05 K/MM3 (ref 0–2)
BASOPHILS NFR BLD: 1.1 % (ref 0–3)
BILIRUB DIRECT SERPL-MCNC: 0.8 MG/DL (ref 0–0.4)
BUN SERPL-MCNC: 20 MG/DL (ref 7–21)
CALCIUM SERPL-MCNC: 9.5 MG/DL (ref 8.4–10.5)
EOSINOPHIL # BLD: 0.3 10*3/UL (ref 0–0.7)
EOSINOPHIL NFR BLD: 5.9 % (ref 1.5–5)
ERYTHROCYTE [DISTWIDTH] IN BLOOD BY AUTOMATED COUNT: 15.4 % (ref 11.5–14.5)
GFR NON-AFRICAN AMERICAN: 45
GRANULOCYTES # BLD: 2.36 10*3/UL (ref 1.4–6.5)
GRANULOCYTES NFR BLD: 53.6 % (ref 50–68)
HGB BLD-MCNC: 12.7 G/DL (ref 14–18)
LYMPHOCYTES # BLD: 1.1 10*3/UL (ref 1.2–3.4)
LYMPHOCYTES NFR BLD AUTO: 25.5 % (ref 22–35)
MAGNESIUM SERPL-MCNC: 2.1 MG/DL (ref 1.7–2.2)
MCH RBC QN AUTO: 28 PG (ref 25–35)
MCHC RBC AUTO-ENTMCNC: 32.7 G/DL (ref 31–37)
MCV RBC AUTO: 85.7 FL (ref 80–105)
MONOCYTES # BLD AUTO: 0.6 10*3/UL (ref 0.1–0.6)
MONOCYTES NFR BLD: 13.9 % (ref 1–6)
PLATELET # BLD: 128 10^3/UL (ref 120–450)
PMV BLD AUTO: 9.6 FL (ref 7–11)
RBC # BLD AUTO: 4.53 10^6/UL (ref 3.5–6.1)
WBC # BLD AUTO: 4.4 10^3/UL (ref 4.5–11)

## 2018-02-15 RX ADMIN — DILTIAZEM HYDROCHLORIDE SCH MG: 240 CAPSULE, COATED, EXTENDED RELEASE ORAL at 09:12

## 2018-02-15 RX ADMIN — Medication SCH MG: at 17:19

## 2018-02-15 RX ADMIN — PANTOPRAZOLE SODIUM SCH MG: 20 TABLET, DELAYED RELEASE ORAL at 17:19

## 2018-02-15 RX ADMIN — Medication SCH MG: at 09:10

## 2018-02-15 RX ADMIN — PANTOPRAZOLE SODIUM SCH MG: 20 TABLET, DELAYED RELEASE ORAL at 05:13

## 2018-02-15 NOTE — CP.PCM.PN
Subjective





- Date & Time of Evaluation


Date of Evaluation: 02/15/18


Time of Evaluation: 14:00





- Subjective


Subjective: 





Feeling better s/p cystoscopy





Objective





- Vital Signs/Intake and Output


Vital Signs (last 24 hours): 


 











Temp Pulse Resp BP Pulse Ox


 


 97.6 F   85   20   105/74   98 


 


 02/15/18 11:46  02/15/18 18:00  02/15/18 11:46  02/15/18 17:18  02/15/18 06:00











- Medications


Medications: 


 Current Medications





Allopurinol (Zyloprim)  300 mg PO DAILY [facility]


   Last Admin: 02/15/18 13:02 Dose:  300 mg


Apixaban (Eliquis)  2.5 mg PO BID [facility]


   PRN Reason: Protocol


   Last Admin: 02/15/18 17:18 Dose:  2.5 mg


Aspirin (Ecotrin)  81 mg PO DAILY [facility]


   Last Admin: 02/15/18 09:09 Dose:  81 mg


Atorvastatin Calcium (Lipitor)  40 mg PO DIN [facility]


   Last Admin: 02/15/18 17:18 Dose:  40 mg


Diltiazem HCl (Cardizem Cd)  240 mg PO DAILY [facility]


   Last Admin: 02/15/18 09:12 Dose:  240 mg


Docusate Sodium (Colace)  100 mg PO TID [facility]


   Last Admin: 02/15/18 17:19 Dose:  100 mg


Magnesium Oxide (Mag-Ox)  800 mg PO BID [facility]


   Last Admin: 02/15/18 17:19 Dose:  800 mg


Metoprolol Tartrate (Lopressor)  50 mg PO BID [facility]


   Last Admin: 02/15/18 17:18 Dose:  50 mg


Ondansetron HCl (Zofran Inj)  4 mg IVP ONCE [facility]


   PRN Reason: Nausea/Vomiting


Pantoprazole Sodium (Protonix Ec Tab)  20 mg PO 0600,1600 [facility]


   Last Admin: 02/15/18 17:19 Dose:  20 mg


Tolvaptan (Samsca)  30 mg PO DAILY [facility]


   Stop: 02/16/18 09:45


   Last Admin: 02/15/18 13:02 Dose:  30 mg











- Labs


Labs: 


 





 02/15/18 06:40 





 02/15/18 06:40 





 











PT  16.1 SECONDS (9.4-12.5)  H  02/12/18  04:30    


 


INR  1.39  (0.93-1.08)  H  02/12/18  04:30    


 


APTT  46.1 Seconds (25.1-36.5)  H  02/12/18  04:30    














- Head Exam


Head Exam: ATRAUMATIC





- Eye Exam


Eye Exam: Normal appearance





- ENT Exam


ENT Exam: Mucous Membranes Dry





- Respiratory Exam


Respiratory Exam: NORMAL BREATHING PATTERN





- Cardiovascular Exam


Cardiovascular Exam: +S1, +S2





- GI/Abdominal Exam


GI & Abdominal Exam: Normal Bowel Sounds





Assessment and Plan


(1) Bone lesion


Assessment & Plan: 


? metastatic prostate cancer


s/p bone lesion bx


f/u path


Status: Acute   





(2) Pleural effusion


Status: Acute   





(3) Anemia


Status: Acute

## 2018-02-15 NOTE — RAD
HISTORY:

ABDOMINAL DISTENTION  



COMPARISON:

No prior.



FINDINGS:



BOWEL:

Gas is seen partially distending numerous large bowel loops and 

potentially a few central small bowel loops may contain some gas as 

well. Consider potential developing ileus pattern. Distal large bowel 

obstruction is unlikely but not fully excluded. No definite free 

intraperitoneal gas.  A few phlebolith type radiodensities are seen 

in the inferior right pelvic soft tissues. A mild-to-moderate amount 

of gas seen in the region of the gastric viscus.



BONES:

Diffuse osteopenia suggests osteoporosis.



OTHER FINDINGS:

None.



IMPRESSION:

Potential developing ileus pattern involving primarily large bowel.  

No gross free intraperitoneal gas. Clinical correlation and 

radiographic follow-up are advised.

## 2018-02-15 NOTE — PCM.URO
Urology Progress Note





- Objective


Lab Studies: Reviewed (dx: elevated  psa, retention,  plans: check pathology 

for prostate cancer, trial of void to follow)


Lab Results Last 24 Hours: 


 Laboratory Results - last 24 hr











  02/15/18 02/15/18





  06:40 06:40


 


WBC  4.4 L 


 


RBC  4.53 


 


Hgb  12.7 L 


 


Hct  38.8 L 


 


MCV  85.7 


 


MCH  28.0 


 


MCHC  32.7 


 


RDW  15.4 H 


 


Plt Count  128 


 


MPV  9.6 


 


Gran %  53.6 


 


Lymph % (Auto)  25.5 


 


Mono % (Auto)  13.9 H 


 


Eos % (Auto)  5.9 H 


 


Baso % (Auto)  1.1 


 


Gran #  2.36 


 


Lymph # (Auto)  1.1 L 


 


Mono # (Auto)  0.6 


 


Eos # (Auto)  0.3 


 


Baso # (Auto)  0.05 


 


Sodium   129 L


 


Potassium   4.5


 


Chloride   90 L


 


Carbon Dioxide   27


 


Anion Gap   16


 


BUN   20


 


Creatinine   1.5


 


Est GFR ( Amer)   55


 


Est GFR (Non-Af Amer)   45


 


Random Glucose   90


 


Calcium   9.5


 


Magnesium   2.1


 


Total Bilirubin   1.2


 


Direct Bilirubin   0.8 H


 


AST   59


 


ALT   41


 


Alkaline Phosphatase   118


 


Total Protein   6.6


 


Albumin   3.4


 


Globulin   3.1


 


Albumin/Globulin Ratio   1.1











Intake & Output: 


 Intake & Output











 02/14/18 02/15/18 02/15/18





 18:59 06:59 18:59


 


Intake Total 180 240 


 


Output Total 775 800 


 


Balance -595 -560 


 


Weight 205 lb  206 lb


 


Intake:   


 


  Oral 180 240 


 


Output:   


 


  Urine 775 800 


 


    Urethral (Fraire) 775 800 


 


Other:   


 


  # Bowel Movements 0 2 











Vital Signs: 


 Vital Signs - 24 hr











  02/14/18 02/14/18 02/14/18





  14:00 17:28 18:00


 


Temperature  97.3 F L 


 


Pulse Rate 99 H 63 96 H


 


Respiratory  18 





Rate   


 


Blood Pressure  109/66 


 


O2 Sat by Pulse   





Oximetry   














  02/14/18 02/14/18 02/14/18





  18:14 20:36 23:49


 


Temperature   97.8 F


 


Pulse Rate 63 84 81


 


Respiratory   19





Rate   


 


Blood Pressure 109/66  95/67 L


 


O2 Sat by Pulse   92 L





Oximetry   














  02/15/18 02/15/18 02/15/18





  02:00 05:22 06:00


 


Temperature   97.4 F L


 


Pulse Rate 95 H 95 H 99 H


 


Respiratory   20





Rate   


 


Blood Pressure   119/95 H


 


O2 Sat by Pulse   98





Oximetry   














  02/15/18 02/15/18 02/15/18





  09:09 09:12 10:00


 


Temperature   


 


Pulse Rate 104 H 110 H 110 H


 


Respiratory   





Rate   


 


Blood Pressure   


 


O2 Sat by Pulse   





Oximetry   














  02/15/18





  11:46


 


Temperature 97.6 F


 


Pulse Rate 84


 


Respiratory 20





Rate 


 


Blood Pressure 121/76


 


O2 Sat by Pulse 





Oximetry

## 2018-02-15 NOTE — PN
DATE:  02/15/2018



SUBJECTIVE:  Patient is seen lying in the bed, in room 272, bed 2.  As

mentioned yesterday, patient still has distended abdomen.  The patient

refused Dulcolax suppository yesterday.



OBJECTIVE:

VITAL SIGNS:  T-max 97.8.  Telemetry shows atrial fibrillation, heart rate

in 80s and 90s.  Blood pressure 119/95, 109/66, 104/74.  Respirations 20,

O2 saturation 98%.

HEENT:  Head examination:  Normocephalic, atraumatic.  HEENT examination

shows pinkish conjunctivae.  Anicteric sclerae.  No oropharyngeal lesion.

NECK:  No neck rigidity.  No visible jugular venous distention.

LUNGS:  Questionable decreased breath sound at the bases.

CARDIOVASCULAR:  S1, S2, irregular rhythm.  Positive systolic murmur, left

sternal border, right second intercostal space, left second intercostal

space.

ABDOMEN:  Still distended, decreased bowel sounds noted.

GENITALIA:  Male.  Positive Fraire catheter.  Draining urine.

EXTREMITIES:  Shows today, patient has some early trace swelling of the

right ankle.  Patient is missing SILVIANO stocking despite my orders, missing

SCDs.  Patient has not been out of bed and has not done much with the

physical therapy.



DIAGNOSTIC:  On 02/15, WBC 4.4, hemoglobin and hematocrit 12.7 and 38.8,

and platelets 128.  Sodium 129, potassium 4.5, chloride 90, CO2 of 27,

anion gap 16.  BUN 20, creatinine 1.5.  GFR 55.  Glucose 90.  Calcium 9.5,

magnesium 2.1.  LFTs are normal.  HIV negative.



Patient's obstructive series from yesterday shows ileus with dilated large

bowel loops.  Patient refused Dulcolax suppository yesterday.  KUB shows

distended large bowel loops with gas, possible ileus, moderate amount of

gas in the gastric viscus.  Diffuse osteopenia, osteoporosis.  Obstructive

series shows developing ileus.



IMPRESSION:

1.  Possible developing versus impending ileus involving large bowel with

distending large bowel loops and with large bowel loop gas.

2.  Moderate amount of gastric viscus gas.

3.  Diffuse osteopenia, osteoporosis.

4.  Atrial fibrillation with rapid ventricular response.

5.  Hyper and hypotension.

6.  Leukopenia, anemia.

7.  Persistent refractory hyponatremia, non-hemolyzed hyperkalemia.

8.  Transaminitis.

9.  Elevated prostate-specific antigen.

1.  Status post cystoscopy, insertion of 18-[place] two-way Fraire catheter. 

Status post cystoscopy, evacuation of clots and fulguration and retrograde

pyelogram.

2.  Atrial fibrillation with rapid ventricular response, resolving.

3.  Abdominal distention, etiology undetermined versus constipation.

4.  Leukopenia.

5.  Normocytic anemia.

6.  Mild coagulopathy.

7.  Transient lactic acidosis.

8.  Persistent refractory hyponatremia.

9.  Non-hemolyzed hyperkalemia.

10.  Hypochloremic hyponatremia.

11.  Hyperbilirubinemia.

12.  Transaminitis.

13.  Elevated prostate-specific antigen.

14.  Gait dysfunction.

15.  Deconditioning.

16.  Proteinuria, hematuria, bacteriuria.

17.  Status post CT-guided L2 vertebral body biopsy.

18.  Hypervolemic hyponatremia, probably secondary to congestive heart

failure versus secondary to metastatic carcinoma.

19.  Syndrome of inappropriate antidiuretic hormone.

20.  Hypertensive kidney disease.

21.  Acute kidney injury with underlying chronic kidney disease.

22.  Systolic congestive heart failure and cardiomyopathy.

23.  Prostatic hypertrophy.

24.  New-onset atrial fibrillation with rapid ventricular response.

25.  Suspected metastases prostate carcinoma with extremely elevated PSA

and bone metastasis.

1.  Atrial fibrillation with rapid ventricular response.

2.  Transient hypotension (resolved).

3.  Leukopenia, anemia.

4.  Transient lactic acidosis.

5.  Refractory hyponatremia.

6.  Hyperkalemia.

7.  Hypomagnesemia.

8.  Hyperbilirubinemia.

9.  Most likely suspected metastatic prostate carcinoma with bone

metastasis with elevated PSA.

10.  Trace proteinuria, microscopic hematuria.

11.  Status post CT-guided lumbar spine biopsy.

12.  Atrial fibrillation with rapid ventricular response.

13.  Voiding dysfunction.

14.  Gait dysfunction.

15.  Systolic congestive heart failure (resolving).

16.  Constipation.

17.  Dyslipidemia.

18.  Hypomagnesemia.

19.  Hyperuricemia.

20.  Deconditioning.

1.  Atrial fibrillation with rapid ventricular response.

2.  Suspect metastatic prostate carcinoma with bone metastasis.

3.  Pleural effusion.

4.  Leukopenia.

5.  Anemia.

6.  Refractory hyponatremia.

7.  Hypervolemic hyponatremia secondary to congestive heart failure.

8.  Possible syndrome of inappropriate antidiuretic hormone.

9.  Hypertensive chronic kidney disease.

10.  Systolic congestive heart failure with cardiomyopathy.

11.  Hypokalemia.

12.  Hypomagnesemia.

13.  Normocytic anemia.

14.  Granulocytosis.

15.  Transient lactic acidosis.

16.  Hypokalemia.

17.  Hyponatremia.

18.  Hypomagnesemia.

19.  Hyperbilirubinemia.

20.  Transient hypotension.

21.  Constipation.

22.  Hypercholesteremia.

23.  Hypomagnesemia.

24.  Hyperuricemia.

1.  New-onset atrial fibrillation with rapid ventricular response.

2.  Dilated cardiomyopathy.

3.  Acute systolic congestive heart failure with elevated brain natriuretic

peptide.

4.  Hyponatremia.

5.  Questionable syndrome of inappropriate antidiuretic hormone secretion.

6.  Most probable metastatic prostate carcinoma with multiple bone

metastases.

7.  Leukopenia and anemia.

8.  Transient lactic acidosis.

9.  Hypokalemia.

10.  Hyperbilirubinemia.

11.  Elevated prostate-specific antigen of greater than 90.

12.  Proteinuria, microscopic hematuria.

13.  Moderate pulmonary arterial hypertension with moderate tricuspid

regurgitation and elevated right ventricular systolic pressure of 52 mmHg.

14.  Bilateral lower extremity lymphedema and venous stasis, resolving.

15.  Lateral coronary ischemic changes.

16.  Constipation.

17.  Hypercholesteremia.

18.  Hypomagnesemia.

19.  Hyperuricemia.

1.  Atrial fibrillation with rapid ventricular response.

2.  Dilated cardiomyopathy with ejection fraction of 21%.

3.  Concentric left ventricular hypertrophy.

4.  Moderate to severely impaired left ventricular function with left

ventricle ejection fraction of 21%.

5.  Global left ventricular hypokinesis.

6.  Moderately reduced right ventricular systolic function.

7.  Moderately dilated left and right atrium.

8.  Moderately thickened aortic valve.

9.  Moderately thickened mitral valve with moderate mitral regurgitation.

10.  Moderate tricuspid regurgitation with moderate pulmonary arterial

hypertension with right ventricular systolic pressure of 52 mmHg.

11.  Atrial fibrillation with rapid ventricular response.

12.  Gait dysfunction.

13.  Bilateral lower extremity lymphedema and venous stasis.

14.  Leukopenia.

15.  Anemia.

16.  Lactic acidosis.

17.  Hypokalemia.

18.  Hyperbilirubinemia.

19.  Elevated prostate-specific antigen.

20.  Hyperbilirubinemia.

21.  Hyponatremia.

22.  Possible syndrome of inappropriate antidiuretic hormone.

23.  Systolic congestive heart failure with elevated BNP.

24.  Systolic and diastolic congestive heart failure with elevated BNP,

decreased left ventricular ejection fraction of 21% and moderate pulmonary

arterial hypertension, moderate tricuspid regurgitation.

1.  Hypertension.

2.  Atrial fibrillation with rapid ventricular response.

3.  Acute systolic and diastolic congestive heart failure with decreased

left ventricular ejection fraction of 20% and abnormal weight gain and

bilateral lower extremity lymphedema.

4. Leukopenia.

5.  Transient lactic acidosis.

6.  Hyponatremia, possibly syndrome of inappropriate antidiuretic hormone

secretion.

7.  Hypomagnesemia.

8.  Hyperbilirubinemia.

9.  Elevated prostate-specific antigen of greater than 90.

10.  Gait dysfunction.

11.  Deconditioning.

12.  Proteinuria, hematuria, bacteriuria.

13.  Axial skeleton diffuse osseous metastatic disease with abnormal

increased uptake of the thoracolumbar spine, right iliac bone, posterior

rib in the right rib, bilateral scapula, proximal femur bilaterally

including the lesser trochanter on the right and subtrochanteric region on

the left with increased uptake in the sternum.

14.  Cerebral cortical atrophy of the brain with moderate volume loss.

15.  Centrilobular and paraseptal emphysema with atelectasis scarring.

16.  Subcentimeter pulmonary nodules of 0.3 cm.

17.  Bilateral pleural effusion.

18.  Cardiomegaly.

19.  Scattered sclerotic bony lesion.

20.  Moderate diffuse stranding of the subcutaneous tissue and anasarca.

21.  Bilateral adrenal gland hypertrophy.

22.  Probable right renal cyst.

23.  Colonic diverticulosis.

24.  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

25.  Degenerative joint disease of the spine.

26.  Small fat-containing umbilical hernia.

27.  Small fat-containing inguinal hernia.

28.  Subcentimeter retroperitoneal lymphadenopathy.

29.  Dilated cardiomyopathy with left ventricular ejection fraction of 21%.

30.  Concentric left ventricular hypertrophy.

31.  Moderate-to-severely impaired left ventricular systolic function with

left ventricular ejection fraction of 21% and left ventricular global

hypokinesis.

32.  Moderately reduced right ventricular systolic function.

33.  Moderately dilated left and right atrium.

34.  Moderately thickened aortic valve.

35.  Moderately thickened mitral valve.

36.  Moderate mitral regurgitation.

37.  Moderate tricuspid regurgitation with moderate pulmonary arterial

hypertension with right ventricular systolic pressure of 52 mmHg.

38.  History of poor compliance and noncompliance.

39.  Hypervolemic hyponatremia secondary to systolic congestive heart

failure.

40.  Increased urinary retention.

41.  Possible and probable stage IV prostate carcinoma with bone metastasis

and extremely high PSA of greater than 90.

1.  Atrial fibrillation with rapid ventricular response.

2.  Noel-inferolateral coronary ischemia.

3.  History of hypertension.

4.  Most possible and most likely systolic congestive heart failure with

elevated BNP.

5.  Cardiomegaly and pulmonary vascular congestion, suggestive of

congestive heart failure.

6.  Leukopenia.

7.  Lactic acidosis.

8.  Hyponatremia, etiology undetermined.

9.  Questionable syndrome of inappropriate (excretion) of antidiuretic

hormone.

10.  Indeterminate troponin.

11.  Elevated BNP suggestive of congestive heart failure.

12.  Trace proteinuria, trace microscopic hematuria, trace bacteriuria.

13.  Bilateral lower extremity lymphedema.

14.  Anasarca.

15.  Extremely poor compliance and noncompliance.

16.  History of hypovitaminosis D.

17.  History of hyperuricemia, hypomagnesemia and history of elevated

prostate-specific antigen.



PLAN:  At this time, the patient has been put on clear liquid diet.  We

will consider Dulcolax suppository again and possible NG tube placement, if

patient agrees.  Patient has not been complying with physical therapy and

 recommendations about subacute rehab and patient is not being

much out of bed to chair.



Patient's current medications has been updated and revised by Dr. Adam Farah.  Patient is on Cardizem  mg daily, Colace 100 mg three times a

day, Dulcolax suppository 10 mg once, Ecotrin 81 mg daily, Eliquis 2.5 mg

twice a day.  Patient is on Lipitor 40 mg daily, Lopressor 50 mg twice a

day, magnesium oxide 800 twice a day, Protonix 40 mg daily, tolvaptan 30 mg

daily, Zofran 4 mg IV p.r.n., and allopurinol 300 mg daily.



Patient has been put on liquid diet.  Awaiting possible consideration for

NG tube placement, physical therapy, occupational therapy.  Patient has

been advised and recommended to consider subacute rehab option.



Dictated and electronically signed, not read.



Signing off,











__________________________________________

Dale Márquez MD



DD:  02/15/2018 11:03:18

DT:  02/15/2018 14:10:28

Job # [id number]

MTDD

## 2018-02-15 NOTE — CP.PCM.PN
Subjective





- Date & Time of Evaluation


Date of Evaluation: 02/15/18


Time of Evaluation: 12:36





- Subjective


Subjective: 


Follow up Nephrology Consultation Note





Assessment: Stable


Hypervolemic hyponatremia likely due to CHF and ? contribution by metastatic 

cancer leading to excess ADH (SIADH)


Hypertensive Chronic Kidney Disease (I12.9)





Chronic Kidney Disease (N18.3) Stage 3 with Cr 1.2 with superimposed mild SUSSY


metastatic cancer, HTN (I12.9)


CHF LVEF 20%


Hypokalemia hypomagnesemia, hx of BPH





Plan


No acute need for renal replacement therapy at this time. 


Hypertension control with meds as ordered. not on ACEI/ARB due to SUSSY, consider 

outpt once stable K and stable creatinine.


Monitor Input/Output, daily weights and renal function with basic metabolic 

panel


supplement electrolytes as needed


continue with tolvaptan 30 mg while in hospital and at d/c. Rx was given to CM. 

Avoid correction in serum Na > 6-8 meq/24 hrs


should be able to resume diuretics as lasix soon once renal function stable.


Avoid nephrotoxins/NSAIDs


Glycemic control


Further work up for as per primary team


pt stable for d/c from renal perspective when planned





Thanks for allowing me to participate in care of your patient. Will follow 

patient with you. Please call if any Qs. had d/w team


Dr iRckey Tapia


Office: [phone] Cell: [phone] Fax: [phone]





Subjective: Noted events overnight. Patients feels okay. 


Denies chest pain, palpitation, improved shortness of breath, leg swelling. 


All other negative. had bone biopsy by IR on 2/13/18





Physical Examination:      


General Appearance: Comfortable, in no acute respiratory distress, co-operative 

. 


Vitals reviewed and noted as below


Head; Atraumatic, normocephalic


ENT: no ulcers no thrush. Tongue is midline. Oropharynx: no rash or ulcers.


EYES: Pupils are equal, round and reactive to light accommodation. Eye muscles 

and extraocular movement intact. Sclera is anicteric.


Neck; supple no lymphadenopathy, no thyromegaly or bruit


Lungs: Normal respiratory rate/effort. Breath sounds bilateral decreased at 

bases


Heart: Normal rate. s1s2 normal. No rub or gallop. 


Extremities: no edema. No varicose veins


Neurological: Patient is alert, awake and oriented to person, place and time. 

No focal deficit. Strength bilateral appropriate and equal


Skin: Warm and dry. Normal turgor. No rash. Palpitation: Normal elasticity for 

age


Abdomen: Abdomen is soft. Bowel sounds +. There is no abdominal tenderness, no 

guarding/rigidity no organomegaly


Psych: normal insight and normal affect/mood


MSK: no joint tenderness or swelling. Digits and nails normal, no deformity


: kidney or bladder not palpable





Labs/imaging reviewed.


Past medical history, past surgical history, family history, social history, 

allergy reviewed and noted as below


Family hx: no hx of CKD. Rest non-contributory 





urine Na 75 urine osmol 190 Serum osmol 281 uric acid 7.9 TSH 2.3 LVEF 20%





Objective





- Vital Signs/Intake and Output


Vital Signs (last 24 hours): 


 











Temp Pulse Resp BP Pulse Ox


 


 97.6 F   84   20   121/76   98 


 


 02/15/18 11:46  02/15/18 11:46  02/15/18 11:46  02/15/18 11:46  02/15/18 06:00








Intake and Output: 


 











 02/15/18 02/15/18





 06:59 18:59


 


Intake Total 240 


 


Output Total 800 


 


Balance -560 














- Medications


Medications: 


 Current Medications





Allopurinol (Zyloprim)  300 mg PO DAILY [facility]ending [facility] to [facility]


   Last Admin: 02/14/18 13:37 Dose:  300 mg


Apixaban (Eliquis)  2.5 mg PO BID [facility] to [facility]


   PRN Reason: Protocol


   Last Admin: 02/15/18 09:09 Dose:  2.5 mg


Aspirin (Ecotrin)  81 mg PO DAILY [facility]ending [facility] to [facility]


   Last Admin: 02/15/18 09:09 Dose:  81 mg


Atorvastatin Calcium (Lipitor)  40 mg PO DIN [facility] to [facility]


   Last Admin: 02/14/18 18:00 Dose:  40 mg


Diltiazem HCl (Cardizem Cd)  240 mg PO DAILY [facility]ending [facility] to [facility]


   Last Admin: 02/15/18 09:12 Dose:  240 mg


Docusate Sodium (Colace)  100 mg PO TID [facility]ending [facility] to [facility]


   Last Admin: 02/15/18 09:09 Dose:  100 mg


Magnesium Oxide (Mag-Ox)  800 mg PO BID Pending [facility] to [facility]


   Last Admin: 02/15/18 09:10 Dose:  800 mg


Metoprolol Tartrate (Lopressor)  50 mg PO BID Pending [facility] to [facility]


   Last Admin: 02/15/18 09:09 Dose:  50 mg


Ondansetron HCl (Zofran Inj)  4 mg IVP ONCE PRN


   PRN Reason: Nausea/Vomiting


Pantoprazole Sodium (Protonix Ec Tab)  20 mg PO 0600,1600 [facility]


   Last Admin: 02/15/18 05:13 Dose:  20 mg


Tolvaptan (Samsca)  30 mg PO DAILY [facility]


   Stop: 02/16/18 09:45


   Last Admin: 02/14/18 13:38 Dose:  30 mg











- Labs


Labs: 


 





 02/15/18 06:40 





 02/15/18 06:40 





 











PT  16.1 SECONDS (9.4-12.5)  H  02/12/18  04:30    


 


INR  1.39  (0.93-1.08)  H  02/12/18  04:30    


 


APTT  46.1 Seconds (25.1-36.5)  H  02/12/18  04:30

## 2018-02-15 NOTE — PN
DATE:  02/15/2018



CARDIOLOGY FOLLOWUP



SUBJECTIVE:  The patient is comfortable.  There is no further urologic

procedures plan.



PHYSICAL EXAMINATION

VITAL SIGNS:  Blood pressure 119/95, the heart rate is currently 110,

atrial fibrillation.

NECK:  Negative JVD.

LUNGS:  Without rales.

HEART:  Reveals S1, S2.

EXTREMITIES:  Without edema.



LABORATORY DATA:  Hemoglobin is 12.7.  Chemistries:  BUN and creatinine are

unremarkable.



IMPRESSION:

1.  Atrial fibrillation with increased heart rate.

2.  Prostate cancer.

3.  Hypertension.

4.  Improved renal function.

5.  Hypercholesterolemia.



PLAN:  Given these findings, we will give an extra dose of Cardizem today. 

We will increase his long-acting Cardizem to 240 CD.



In addition, we will restart his Eliquis at 2.5 mg b.i.d. to prevent

thromboembolism.





__________________________________________

Adam Farah MD



DD:  02/15/2018 8:52:29

DT:  02/15/2018 8:55:56

Job # [id number]

## 2018-02-15 NOTE — PN
DATE:  02/14/2018



SUBJECTIVE:  The patient is seen lying in the bed in room 272, bed 2.  The

patient was attempted to be seen in the morning, but the patient was

undergoing multiple testing and was in the operating room for urological

intervention.



PHYSICAL EXAMINATION:

VITAL SIGNS:  T-max 98.4.  Telemetry shows atrial fibrillation, heart rate

63, 96, 69, 91, 85, 86, 78; respiration 18; O2 sat is 100%; blood pressure

in the last 24 hours 127/79, 122/56, 100/74, 130/82, 109/74, 104/74. 

Intake/output:  Yesterday's output is 800.  Today's urine output is 1000.

HEENT:  The patient's head examination is normocephalic, atraumatic.  HEENT

examination shows pink conjunctivae.  Anicteric sclerae.  No oropharyngeal

lesion.  No jugular venous distention.

CHEST:  Kyphosis.

LUNGS:  Shows questionable decreased breath sound at the bases.

CARDIOVASCULAR:  S1 and S2 with regular rhythm.  Positive systolic murmur,

left sternal border, right second intercostal space, left second

intercostal space.

ABDOMEN:  Protuberant, distended, tympanic, but decreased bowel sounds.

GENITALIA:  Male.  Positive Fraire catheter noted draining urine.  Bladder

scan was done, which shows zero residual.

EXTREMITIES:  Shows complete resolution of the pitting edema of the legs

and the feet.  There is some edema of the thigh noted.

MUSCULOSKELETAL:  Examination shows a body mass index of 29.

NEUROLOGIC:  The patient is alert, awake, responsive, is able to move upper

and lower extremity without assistance.  Gait examination is not tested.

VASCULAR:  Palpable pulses.



DIAGNOSTICS:  On 02/14/2018, WBC 5.2, hemoglobin and hematocrit 13.3 and

40.5, platelet 156.  Sodium has dropped to 126, potassium 5.1, chloride 87,

CO2 of 26, anion gap 18, BUN 19, creatinine 1.7, GFR 47, glucose 104,

calcium 9.4, magnesium 2.1, total bili 1.7, direct bili 1.1, AST 62.  LFTs

are normal.  HIV is negative.



IMPRESSION AND PLAN:

1.  Status post cystoscopy, insertion of 18-Kazakh two-way Fraire catheter. 

Status post cystoscopy, evacuation of clots and fulguration and retrograde

pyelogram.

2.  Atrial fibrillation with rapid ventricular response, resolving.

3.  Abdominal distention, etiology undetermined versus constipation.

4.  Leukopenia.

5.  Normocytic anemia.

6.  Mild coagulopathy.

7.  Transient lactic acidosis.

8.  Persistent refractory hyponatremia.

9.  Non-hemolyzed hyperkalemia.

10.  Hypochloremic hyponatremia.

11.  Hyperbilirubinemia.

12.  Transaminitis.

13.  Elevated prostate-specific antigen.

14.  Gait dysfunction.

15.  Deconditioning.

16.  Proteinuria, hematuria, bacteriuria.

17.  Status post CT-guided L2 vertebral body biopsy.

18.  Hypervolemic hyponatremia, probably secondary to congestive heart

failure versus secondary to metastatic carcinoma.

19.  Syndrome of inappropriate antidiuretic hormone.

20.  Hypertensive kidney disease.

21.  Acute kidney injury with underlying chronic kidney disease.

22.  Systolic congestive heart failure and cardiomyopathy.

23.  Prostatic hypertrophy.

24.  New-onset atrial fibrillation with rapid ventricular response.

25.  Suspected metastases prostate carcinoma with extremely elevated PSA

and bone metastasis.

1.  Atrial fibrillation with rapid ventricular response.

2.  Transient hypotension (resolved).

3.  Leukopenia, anemia.

4.  Transient lactic acidosis.

5.  Refractory hyponatremia.

6.  Hyperkalemia.

7.  Hypomagnesemia.

8.  Hyperbilirubinemia.

9.  Most likely suspected metastatic prostate carcinoma with bone

metastasis with elevated PSA.

10.  Trace proteinuria, microscopic hematuria.

11.  Status post CT-guided lumbar spine biopsy.

12.  Atrial fibrillation with rapid ventricular response.

13.  Voiding dysfunction.

14.  Gait dysfunction.

15.  Systolic congestive heart failure (resolving).

16.  Constipation.

17.  Dyslipidemia.

18.  Hypomagnesemia.

19.  Hyperuricemia.

20.  Deconditioning.

1.  Atrial fibrillation with rapid ventricular response.

2.  Suspect metastatic prostate carcinoma with bone metastasis.

3.  Pleural effusion.

4.  Leukopenia.

5.  Anemia.

6.  Refractory hyponatremia.

7.  Hypervolemic hyponatremia secondary to congestive heart failure.

8.  Possible syndrome of inappropriate antidiuretic hormone.

9.  Hypertensive chronic kidney disease.

10.  Systolic congestive heart failure with cardiomyopathy.

11.  Hypokalemia.

12.  Hypomagnesemia.

13.  Normocytic anemia.

14.  Granulocytosis.

15.  Transient lactic acidosis.

16.  Hypokalemia.

17.  Hyponatremia.

18.  Hypomagnesemia.

19.  Hyperbilirubinemia.

20.  Transient hypotension.

21.  Constipation.

22.  Hypercholesteremia.

23.  Hypomagnesemia.

24.  Hyperuricemia.

1.  New-onset atrial fibrillation with rapid ventricular response.

2.  Dilated cardiomyopathy.

3.  Acute systolic congestive heart failure with elevated brain natriuretic

peptide.

4.  Hyponatremia.

5.  Questionable syndrome of inappropriate antidiuretic hormone secretion.

6.  Most probable metastatic prostate carcinoma with multiple bone

metastases.

7.  Leukopenia and anemia.

8.  Transient lactic acidosis.

9.  Hypokalemia.

10.  Hyperbilirubinemia.

11.  Elevated prostate-specific antigen of greater than 90.

12.  Proteinuria, microscopic hematuria.

13.  Moderate pulmonary arterial hypertension with moderate tricuspid

regurgitation and elevated right ventricular systolic pressure of 52 mmHg.

14.  Bilateral lower extremity lymphedema and venous stasis, resolving.

15.  Lateral coronary ischemic changes.

16.  Constipation.

17.  Hypercholesteremia.

18.  Hypomagnesemia.

19.  Hyperuricemia.

1.  Atrial fibrillation with rapid ventricular response.

2.  Dilated cardiomyopathy with ejection fraction of 21%.

3.  Concentric left ventricular hypertrophy.

4.  Moderate to severely impaired left ventricular function with left

ventricle ejection fraction of 21%.

5.  Global left ventricular hypokinesis.

6.  Moderately reduced right ventricular systolic function.

7.  Moderately dilated left and right atrium.

8.  Moderately thickened aortic valve.

9.  Moderately thickened mitral valve with moderate mitral regurgitation.

10.  Moderate tricuspid regurgitation with moderate pulmonary arterial

hypertension with right ventricular systolic pressure of 52 mmHg.

11.  Atrial fibrillation with rapid ventricular response.

12.  Gait dysfunction.

13.  Bilateral lower extremity lymphedema and venous stasis.

14.  Leukopenia.

15.  Anemia.

16.  Lactic acidosis.

17.  Hypokalemia.

18.  Hyperbilirubinemia.

19.  Elevated prostate-specific antigen.

20.  Hyperbilirubinemia.

21.  Hyponatremia.

22.  Possible syndrome of inappropriate antidiuretic hormone.

23.  Systolic congestive heart failure with elevated BNP.

24.  Systolic and diastolic congestive heart failure with elevated BNP,

decreased left ventricular ejection fraction of 21% and moderate pulmonary

arterial hypertension, moderate tricuspid regurgitation.

1.  Hypertension.

2.  Atrial fibrillation with rapid ventricular response.

3.  Acute systolic and diastolic congestive heart failure with decreased

left ventricular ejection fraction of 20% and abnormal weight gain and

bilateral lower extremity lymphedema.

4. Leukopenia.

5.  Transient lactic acidosis.

6.  Hyponatremia, possibly syndrome of inappropriate antidiuretic hormone

secretion.

7.  Hypomagnesemia.

8.  Hyperbilirubinemia.

9.  Elevated prostate-specific antigen of greater than 90.

10.  Gait dysfunction.

11.  Deconditioning.

12.  Proteinuria, hematuria, bacteriuria.

13.  Axial skeleton diffuse osseous metastatic disease with abnormal

increased uptake of the thoracolumbar spine, right iliac bone, posterior

rib in the right rib, bilateral scapula, proximal femur bilaterally

including the lesser trochanter on the right and subtrochanteric region on

the left with increased uptake in the sternum.

14.  Cerebral cortical atrophy of the brain with moderate volume loss.

15.  Centrilobular and paraseptal emphysema with atelectasis scarring.

16.  Subcentimeter pulmonary nodules of 0.3 cm.

17.  Bilateral pleural effusion.

18.  Cardiomegaly.

19.  Scattered sclerotic bony lesion.

20.  Moderate diffuse stranding of the subcutaneous tissue and anasarca.

21.  Bilateral adrenal gland hypertrophy.

22.  Probable right renal cyst.

23.  Colonic diverticulosis.

24.  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

25.  Degenerative joint disease of the spine.

26.  Small fat-containing umbilical hernia.

27.  Small fat-containing inguinal hernia.

28.  Subcentimeter retroperitoneal lymphadenopathy.

29.  Dilated cardiomyopathy with left ventricular ejection fraction of 21%.

30.  Concentric left ventricular hypertrophy.

31.  Moderate-to-severely impaired left ventricular systolic function with

left ventricular ejection fraction of 21% and left ventricular global

hypokinesis.

32.  Moderately reduced right ventricular systolic function.

33.  Moderately dilated left and right atrium.

34.  Moderately thickened aortic valve.

35.  Moderately thickened mitral valve.

36.  Moderate mitral regurgitation.

37.  Moderate tricuspid regurgitation with moderate pulmonary arterial

hypertension with right ventricular systolic pressure of 52 mmHg.

38.  History of poor compliance and noncompliance.

39.  Hypervolemic hyponatremia secondary to systolic congestive heart

failure.

40.  Increased urinary retention.

41.  Possible and probable stage IV prostate carcinoma with bone metastasis

and extremely high PSA of greater than 90.

1.  Atrial fibrillation with rapid ventricular response.

2.  Noel-inferolateral coronary ischemia.

3.  History of hypertension.

4.  Most possible and most likely systolic congestive heart failure with

elevated BNP.

5.  Cardiomegaly and pulmonary vascular congestion, suggestive of

congestive heart failure.

6.  Leukopenia.

7.  Lactic acidosis.

8.  Hyponatremia, etiology undetermined.

9.  Questionable syndrome of inappropriate (excretion) of antidiuretic

hormone.

10.  Indeterminate troponin.

11.  Elevated BNP suggestive of congestive heart failure.

12.  Trace proteinuria, trace microscopic hematuria, trace bacteriuria.

13.  Bilateral lower extremity lymphedema.

14.  Anasarca.

15.  Extremely poor compliance and noncompliance.

16.  History of hypovitaminosis D.

17.  History of hyperuricemia, hypomagnesemia and history of elevated

prostate-specific antigen.



PLAN:  At this time, the patient has been ordered a stat obstructive series

for evaluation of abdominal distention.  The patient is to be continued on

the Fraire catheter drainage of the urinary bladder.  The patient has been

ordered repeat labs.  Obstructive series stat ordered.  Current

consultation:  Cardiology, Nephrology, Urology, Hematology/Oncology,

Interventional Radiology.  The patient has been has been ordered

Transitional Care Unit.  The patient was seen by nephrologist.  Tolv[place]

[place] [facility] [place]  [place] medications:  The

patient is started on Cardizem  mg p.o. daily, Colace 100 mg three

times a day, Ecotrin 81 mg daily.  The patient was given Kayexalate 30 g

p.o. once today, Lipitor 40 mg daily, Lopressor 50 mg twice a day, Lovenox

40 mg subcu daily, magnesium oxide 800 twice a day, Protonix 40 mg daily. 

The patient was given today tolvaptan/Samsca 30 mg daily started by Dr. Ruiz.  The patient is on allopurinol 3 mg daily, oxygen continuous 2 L. 

Out of bed, SILVIANO stockings, SCDs, occupational therapy, physical therapy

ordered.  The patient was seen by physical therapist.  Recommend continue

PT and subacute rehab.  The patient has been ordered out of bed to chair. 

Daily weights, SCDs.



At present, plan at this time, if the patient's bone biopsy results are not

available, the patient will proceed with urology prostate biopsy.  As per

Dr. Gray, which has been updated to the patient.  At present, the

patient has also been ordered Dulcolax suppository for constipation.



Dictated and electronically signed, not read.





__________________________________________

Dale Márquez MD





DD:  02/14/2018 19:34:26

DT:  02/14/2018 19:38:28

Job # [id number]



GABBY

## 2018-02-16 LAB
ALBUMIN SERPL-MCNC: 3.2 G/DL (ref 3–4.8)
ALBUMIN/GLOB SERPL: 1 {RATIO} (ref 1.1–1.8)
ALT SERPL-CCNC: 44 U/L (ref 7–56)
AST SERPL-CCNC: 58 U/L (ref 17–59)
BASOPHILS # BLD AUTO: 0.04 K/MM3 (ref 0–2)
BASOPHILS NFR BLD: 1 % (ref 0–3)
BILIRUB DIRECT SERPL-MCNC: 0.7 MG/DL (ref 0–0.4)
BUN SERPL-MCNC: 18 MG/DL (ref 7–21)
CALCIUM SERPL-MCNC: 9.5 MG/DL (ref 8.4–10.5)
EOSINOPHIL # BLD: 0.2 10*3/UL (ref 0–0.7)
EOSINOPHIL NFR BLD: 3.9 % (ref 1.5–5)
ERYTHROCYTE [DISTWIDTH] IN BLOOD BY AUTOMATED COUNT: 15.3 % (ref 11.5–14.5)
GFR NON-AFRICAN AMERICAN: 49
GRANULOCYTES # BLD: 2.41 10*3/UL (ref 1.4–6.5)
GRANULOCYTES NFR BLD: 59.4 % (ref 50–68)
HGB BLD-MCNC: 12.4 G/DL (ref 14–18)
LYMPHOCYTES # BLD: 0.9 10*3/UL (ref 1.2–3.4)
LYMPHOCYTES NFR BLD AUTO: 21.7 % (ref 22–35)
MAGNESIUM SERPL-MCNC: 2.1 MG/DL (ref 1.7–2.2)
MCH RBC QN AUTO: 28.2 PG (ref 25–35)
MCHC RBC AUTO-ENTMCNC: 33.2 G/DL (ref 31–37)
MCV RBC AUTO: 84.8 FL (ref 80–105)
MONOCYTES # BLD AUTO: 0.6 10*3/UL (ref 0.1–0.6)
MONOCYTES NFR BLD: 14 % (ref 1–6)
PLATELET # BLD: 139 10^3/UL (ref 120–450)
PMV BLD AUTO: 10.2 FL (ref 7–11)
RBC # BLD AUTO: 4.4 10^6/UL (ref 3.5–6.1)
WBC # BLD AUTO: 4.1 10^3/UL (ref 4.5–11)

## 2018-02-16 RX ADMIN — ENOXAPARIN SODIUM SCH MG: 80 INJECTION SUBCUTANEOUS at 11:20

## 2018-02-16 RX ADMIN — PANTOPRAZOLE SODIUM SCH MG: 20 TABLET, DELAYED RELEASE ORAL at 15:47

## 2018-02-16 RX ADMIN — Medication SCH MG: at 18:13

## 2018-02-16 RX ADMIN — PANTOPRAZOLE SODIUM SCH MG: 20 TABLET, DELAYED RELEASE ORAL at 06:53

## 2018-02-16 RX ADMIN — ENOXAPARIN SODIUM SCH MG: 80 INJECTION SUBCUTANEOUS at 22:31

## 2018-02-16 RX ADMIN — DIGOXIN SCH MG: 0.12 TABLET ORAL at 11:16

## 2018-02-16 RX ADMIN — DIGOXIN SCH: 0.12 TABLET ORAL at 13:39

## 2018-02-16 RX ADMIN — DILTIAZEM HYDROCHLORIDE SCH MG: 240 CAPSULE, COATED, EXTENDED RELEASE ORAL at 11:18

## 2018-02-16 RX ADMIN — Medication SCH MG: at 11:16

## 2018-02-16 RX ADMIN — POLYETHYLENE GLYCOL 3350 SCH GM: 17 POWDER, FOR SOLUTION ORAL at 11:21

## 2018-02-16 RX ADMIN — POLYETHYLENE GLYCOL 3350 SCH GM: 17 POWDER, FOR SOLUTION ORAL at 18:14

## 2018-02-16 NOTE — CP.PCM.PN
Subjective





- Date & Time of Evaluation


Date of Evaluation: 02/16/18


Time of Evaluation: 14:46





- Subjective


Subjective: 





Follow up Nephrology Consultation Note





Assessment: Stable


Hypervolemic hyponatremia likely due to CHF and ? contribution by metastatic 

cancer leading to excess ADH (SIADH)


Hypertensive Chronic Kidney Disease (I12.9)





Chronic Kidney Disease (N18.3) Stage 3 with Cr 1.2 with superimposed mild SUSSY


metastatic cancer, HTN (I12.9)


CHF LVEF 20%


Hypokalemia hypomagnesemia, hx of BPH





Plan


No acute need for renal replacement therapy at this time. 


Hypertension control with meds as ordered. started low dose losartan.


Monitor Input/Output, daily weights and renal function with basic metabolic 

panel


supplement electrolytes as needed


continue with tolvaptan 30 mg while in hospital and at d/c. Rx was given to CM. 

Avoid correction in serum Na > 6-8 meq/24 hrs


should be able to resume diuretics as lasix soon once renal function stable.


Avoid nephrotoxins/NSAIDs


Glycemic control


Further work up for as per primary team


pt stable for d/c from renal perspective when planned





Thanks for allowing me to participate in care of your patient. Will follow 

patient with you. Please call if any Qs. had d/w team


Dr Rickey Taipa


Office: [phone] Cell: [phone] Fax: [phone]





Subjective: Noted events overnight. Patients feels okay. 


Denies chest pain, palpitation, improved shortness of breath, leg swelling. 


All other negative. had bone biopsy by IR on 2/13/18





Physical Examination:      


General Appearance: Comfortable, in no acute respiratory distress, co-operative 

. 


Vitals reviewed and noted as below


Head; Atraumatic, normocephalic


ENT: no ulcers no thrush. Tongue is midline. Oropharynx: no rash or ulcers.


EYES: Pupils are equal, round and reactive to light accommodation. Eye muscles 

and extraocular movement intact. Sclera is anicteric.


Neck; supple no lymphadenopathy, no thyromegaly or bruit


Lungs: Normal respiratory rate/effort. Breath sounds bilateral improved


Heart: Normal rate. s1s2 normal. No rub or gallop. 


Extremities: no edema. No varicose veins


Neurological: Patient is alert, awake and oriented to person, place and time. 

No focal deficit. Strength bilateral appropriate and equal


Skin: Warm and dry. Normal turgor. No rash. Palpitation: Normal elasticity for 

age


Abdomen: Abdomen is soft. Bowel sounds +. There is no abdominal tenderness, no 

guarding/rigidity no organomegaly


Psych: normal insight and normal affect/mood


MSK: no joint tenderness or swelling. Digits and nails normal, no deformity


: kidney or bladder not palpable





Labs/imaging reviewed.


Past medical history, past surgical history, family history, social history, 

allergy reviewed and noted as below


Family hx: no hx of CKD. Rest non-contributory 





urine Na 75 urine osmol 190 Serum osmol 281 uric acid 7.9 TSH 2.3 LVEF 20%





Objective





- Vital Signs/Intake and Output


Vital Signs (last 24 hours): 


 











Temp Pulse Resp BP Pulse Ox


 


 97.6 F   73   18   120/72   100 


 


 02/16/18 12:00  02/16/18 12:00  02/16/18 12:00  02/16/18 12:00  02/16/18 06:00








Intake and Output: 


 











 02/16/18 02/16/18





 06:59 18:59


 


Intake Total 360 


 


Output Total 950 


 


Balance -590 














- Medications


Medications: 


 Current Medications





Allopurinol (Zyloprim)  300 mg PO DAILY [facility]


   Last Admin: 02/16/18 12:11 Dose:  300 mg


Aspirin (Ecotrin)  81 mg PO DAILY [facility]


   Last Admin: 02/16/18 11:20 Dose:  81 mg


Atorvastatin Calcium (Lipitor)  40 mg PO DIN [facility]


   Last Admin: 02/15/18 17:18 Dose:  40 mg


Digoxin (Digoxin)  0.125 mg PO 1400 [facility]


   Last Admin: 02/16/18 13:39 Dose:  Not Given


Diltiazem HCl (Cardizem Cd)  240 mg PO DAILY [facility]


   Last Admin: 02/16/18 11:18 Dose:  240 mg


Docusate Sodium (Colace)  100 mg PO TID [facility]


   Last Admin: 02/16/18 11:20 Dose:  100 mg


Enoxaparin Sodium (Lovenox)  80 mg SC Q12H [facility]


   PRN Reason: Protocol


   Last Admin: 02/16/18 11:20 Dose:  80 mg


Losartan Potassium (Cozaar)  25 mg PO QPM [facility]


Magnesium Oxide (Mag-Ox)  800 mg PO BID [facility]


   Last Admin: 02/16/18 11:16 Dose:  800 mg


Metoprolol Tartrate (Lopressor)  50 mg PO BID [facility]


   Last Admin: 02/16/18 11:19 Dose:  50 mg


Ondansetron HCl (Zofran Inj)  4 mg IVP ONCE PRN


   PRN Reason: Nausea/Vomiting


Pantoprazole Sodium (Protonix Ec Tab)  20 mg PO 0600,1600 [facility]


   Last Admin: 02/16/18 06:53 Dose:  20 mg


Polyethylene Glycol (Miralax)  17 gm PO BID [facility]


   Last Admin: 02/16/18 11:21 Dose:  17 gm


Tolvaptan (Samsca)  30 mg PO DAILY [facility]


   Stop: 02/18/18 10:28


   Last Admin: 02/16/18 12:11 Dose:  30 mg











- Labs


Labs: 


 





 02/16/18 06:02 





 02/16/18 06:02 





 











PT  16.1 SECONDS (9.4-12.5)  H  02/12/18  04:30    


 


INR  1.39  (0.93-1.08)  H  02/12/18  04:30    


 


APTT  46.1 Seconds (25.1-36.5)  H  02/12/18  04:30

## 2018-02-16 NOTE — CP.PCM.PN
Subjective





- Date & Time of Evaluation


Date of Evaluation: 02/16/18


Time of Evaluation: 18:00





- Subjective


Subjective: 





No complaints.





Objective





- Vital Signs/Intake and Output


Vital Signs (last 24 hours): 


 











Temp Pulse Resp BP Pulse Ox


 


 97.4 F L  56 L  18   104/52 L  100 


 


 02/16/18 17:58  02/16/18 18:14  02/16/18 17:58  02/16/18 18:14  02/16/18 06:00











- Medications


Medications: 


 Current Medications





Allopurinol (Zyloprim)  300 mg PO DAILY [facility]


   Last Admin: 02/16/18 12:11 Dose:  300 mg


Aspirin (Ecotrin)  81 mg PO DAILY [facility]


   Last Admin: 02/16/18 11:20 Dose:  81 mg


Atorvastatin Calcium (Lipitor)  40 mg PO DIN [facility]


   Last Admin: 02/16/18 18:13 Dose:  40 mg


Digoxin (Digoxin)  0.125 mg PO 1400 [facility]


   Last Admin: 02/16/18 13:39 Dose:  Not Given


Diltiazem HCl (Cardizem Cd)  240 mg PO DAILY [facility]


   Last Admin: 02/16/18 11:18 Dose:  240 mg


Docusate Sodium (Colace)  100 mg PO TID [facility]


   Last Admin: 02/16/18 18:27 Dose:  Not Given


Enoxaparin Sodium (Lovenox)  80 mg SC Q12H [facility]


   PRN Reason: Protocol


   Last Admin: 02/16/18 11:20 Dose:  80 mg


Losartan Potassium (Cozaar)  25 mg PO QPM [facility]


   Last Admin: 02/16/18 18:14 Dose:  Not Given


Magnesium Oxide (Mag-Ox)  800 mg PO BID [facility]


   Last Admin: 02/16/18 18:13 Dose:  800 mg


Metoprolol Tartrate (Lopressor)  50 mg PO BID [facility]


   Last Admin: 02/16/18 18:10 Dose:  Not Given


Ondansetron HCl (Zofran Inj)  4 mg IVP ONCE PRN


   PRN Reason: Nausea/Vomiting


Pantoprazole Sodium (Protonix Ec Tab)  20 mg PO 0600,1600 [facility]


   Last Admin: 02/16/18 15:47 Dose:  20 mg


Polyethylene Glycol (Miralax)  17 gm PO BID [facility]


   Last Admin: 02/16/18 18:14 Dose:  17 gm


Tolvaptan (Samsca)  30 mg PO DAILY [facility]


   Stop: 02/18/18 10:28


   Last Admin: 02/16/18 12:11 Dose:  30 mg











- Labs


Labs: 


 





 02/16/18 06:02 





 02/16/18 06:02 





 











PT  16.1 SECONDS (9.4-12.5)  H  02/12/18  04:30    


 


INR  1.39  (0.93-1.08)  H  02/12/18  04:30    


 


APTT  46.1 Seconds (25.1-36.5)  H  02/12/18  04:30    














- Head Exam


Head Exam: ATRAUMATIC





- Eye Exam


Eye Exam: Normal appearance





- ENT Exam


ENT Exam: Mucous Membranes Dry





- Respiratory Exam


Respiratory Exam: NORMAL BREATHING PATTERN





- Cardiovascular Exam


Cardiovascular Exam: +S1, +S2





- GI/Abdominal Exam


GI & Abdominal Exam: Normal Bowel Sounds





- Extremities Exam


Extremities Exam: Pedal Edema





Assessment and Plan


(1) Bone lesion


Assessment & Plan: 


 suspect metastatic prostate cancer


s/p bone lesion bx


f/u path


Status: Acute   





(2) Pleural effusion


Assessment & Plan: 


comfortable at rest


Status: Acute   





(3) Anemia


Assessment & Plan: 


chronic disease and CKD


bone marrow involvement of likely metastatic disease


Status: Acute

## 2018-02-16 NOTE — PN
DATE:  02/16/2018



CARDIOLOGY FOLLOWUP



SUBJECTIVE:  The patient is comfortable.  The heart rate is improved.



PHYSICAL EXAMINATION

VITAL SIGNS:  Blood pressure is 113/68, the heart rate is atrial

fibrillation at 100.

NECK:  Negative JVD.

LUNGS:  Without rales.

HEART:  Reveals S1, S2.

EXTREMITIES:  Without edema.



LABORATORY DATA:  BUN and creatinine are 18 and 1.4.  Hemoglobin is 12.4.



We will add low-dose digoxin to his regimen.  We will continue the Cardizem

and beta blockers.  We will discontinue telemetry today.





__________________________________________

Adam Farah MD



DD:  02/16/2018 8:59:13

DT:  02/16/2018 9:02:04

Job # [id number]

## 2018-02-16 NOTE — PN
DATE:  02/16/2018



LOCATION:  The patient is seen in room 267, bed 2.



SUBJECTIVE:  The patient is lying in the bed.  The patient is having

breakfast.  Overnight events were noted.  The patient's appetite was 100%. 

The patient was seen by the .  The patient is still interested

in going to rehab or TCU in [facility].  According to the social

worker's eval, TCU is in network with the patient's insurance.  The patient

slept well without any problems.



PHYSICAL EXAMINATION:

VITAL SIGNS:  T-max is 97.4, 97.6.  Heart rate 89, 78, 91, 101, 82, 84, 86;

respiration 19; blood pressure 120/62, 121/76; O2 sat 100%.

HEENT:  Head examination is normocephalic, atraumatic.  HEENT examination

shows pink conjunctivae.  Anicteric sclerae.  No oropharyngeal lesion.  No

neck rigidity.  No jugular venous distention.

CHEST:  Kyphosis.

LUNGS:  Shows no rales, crackles or wheezing.  Decreased breath sound at

the bases.

CARDIOVASCULAR:  S1 and S2 with regular rhythm.

ABDOMEN:  Soft.  Positive bowel sound.

GENITALIA:  Male.

RECTAL:  Deferred.

EXTREMITIES:  Shows no pitting edema.  No calf tenderness.  No Quincy's

sign.  Positive SILVIANO stocking noted.

MUSCULOSKELETAL:  Shows a body mass index of 28.5.

NEUROLOGIC:  The patient is alert, awake, oriented x3.  Cranial nerves II

through XII intact.



DIAGNOSTICS:  On 02/16/2018, WBC 4.1, hemoglobin and hematocrit 124 and

37.3, platelet 139.  Sodium is up to 132, potassium 4.6, chloride 92, CO2

of 28, anion gap 16, BUN 18, creatinine 1.4, GFR 59, glucose 95, calcium

9.5, magnesium 2.1, alk phos 128.  EKG from yesterday shows atrial

fibrillation.



IMPRESSION AND PLAN:

1.  Atrial fibrillation with rapid ventricular response.

2.  Acute systolic congestive heart failure with elevated BNP and bilateral

lower extremity lymphedema (resolved).

3.  Dilated cardiomyopathy with left ventricular ejection fraction of 20%

and pulmonary arterial hypertension with elevated right ventricular

systolic pressure of 52 mmHg.

4.  Moderate to severely impaired left ventricular systolic function and

global left ventricular hypokinesis.

5.  Concentric left ventricular hypertrophy.

6.  Moderately reduced right ventricular systolic function.

7.  Moderately dilated left atrium and moderately dilated right atrium.

8.  Moderately thickened aortic valve.

9.  Moderately thickened mitral valve.

10.  Moderate mitral regurgitation.

11.  Moderate tricuspid regurgitation and moderate pulmonary arterial

hypertension.

12.  Deconditioning.

13.  Gait dysfunction.

14.  Leukopenia, anemia.

15.  Slow resolving refractory hyponatremia.

16.  Lactic acidosis.

17.  Status post nonhemolyzed hyperkalemia.

18.  Status post acute kidney injury with chronic kidney disease stage 3.

19.  Transaminitis.

1.  Possible developing versus impending ileus involving large bowel with

distending large bowel loops and with large bowel loop gas.

2.  Moderate amount of gastric viscus gas.

3.  Diffuse osteopenia, osteoporosis.

4.  Atrial fibrillation with rapid ventricular response.

5.  Hyper and hypotension.

6.  Leukopenia, anemia.

7.  Persistent refractory hyponatremia, non-hemolyzed hyperkalemia.

8.  Transaminitis.

9.  Elevated prostate-specific antigen.

1.  Status post cystoscopy, insertion of 18-[place] two-way Fraire catheter. 

Status post cystoscopy, evacuation of clots and fulguration and retrograde

pyelogram.

2.  Atrial fibrillation with rapid ventricular response, resolving.

3.  Abdominal distention, etiology undetermined versus constipation.

4.  Leukopenia.

5.  Normocytic anemia.

6.  Mild coagulopathy.

7.  Transient lactic acidosis.

8.  Persistent refractory hyponatremia.

9.  Non-hemolyzed hyperkalemia.

10.  Hypochloremic hyponatremia.

11.  Hyperbilirubinemia.

12.  Transaminitis.

13.  Elevated prostate-specific antigen.

14.  Gait dysfunction.

15.  Deconditioning.

16.  Proteinuria, hematuria, bacteriuria.

17.  Status post CT-guided L2 vertebral body biopsy.

18.  Hypervolemic hyponatremia, probably secondary to congestive heart

failure versus secondary to metastatic carcinoma.

19.  Syndrome of inappropriate antidiuretic hormone.

20.  Hypertensive kidney disease.

21.  Acute kidney injury with underlying chronic kidney disease.

22.  Systolic congestive heart failure and cardiomyopathy.

23.  Prostatic hypertrophy.

24.  New-onset atrial fibrillation with rapid ventricular response.

25.  Suspected metastases prostate carcinoma with extremely elevated PSA

and bone metastasis.

1.  Atrial fibrillation with rapid ventricular response.

2.  Transient hypotension (resolved).

3.  Leukopenia, anemia.

4.  Transient lactic acidosis.

5.  Refractory hyponatremia.

6.  Hyperkalemia.

7.  Hypomagnesemia.

8.  Hyperbilirubinemia.

9.  Most likely suspected metastatic prostate carcinoma with bone

metastasis with elevated PSA.

10.  Trace proteinuria, microscopic hematuria.

11.  Status post CT-guided lumbar spine biopsy.

12.  Atrial fibrillation with rapid ventricular response.

13.  Voiding dysfunction.

14.  Gait dysfunction.

15.  Systolic congestive heart failure (resolving).

16.  Constipation.

17.  Dyslipidemia.

18.  Hypomagnesemia.

19.  Hyperuricemia.

20.  Deconditioning.

1.  Atrial fibrillation with rapid ventricular response.

2.  Suspect metastatic prostate carcinoma with bone metastasis.

3.  Pleural effusion.

4.  Leukopenia.

5.  Anemia.

6.  Refractory hyponatremia.

7.  Hypervolemic hyponatremia secondary to congestive heart failure.

8.  Possible syndrome of inappropriate antidiuretic hormone.

9.  Hypertensive chronic kidney disease.

10.  Systolic congestive heart failure with cardiomyopathy.

11.  Hypokalemia.

12.  Hypomagnesemia.

13.  Normocytic anemia.

14.  Granulocytosis.

15.  Transient lactic acidosis.

16.  Hypokalemia.

17.  Hyponatremia.

18.  Hypomagnesemia.

19.  Hyperbilirubinemia.

20.  Transient hypotension.

21.  Constipation.

22.  Hypercholesteremia.

23.  Hypomagnesemia.

24.  Hyperuricemia.

1.  New-onset atrial fibrillation with rapid ventricular response.

2.  Dilated cardiomyopathy.

3.  Acute systolic congestive heart failure with elevated brain natriuretic

peptide.

4.  Hyponatremia.

5.  Questionable syndrome of inappropriate antidiuretic hormone secretion.

6.  Most probable metastatic prostate carcinoma with multiple bone

metastases.

7.  Leukopenia and anemia.

8.  Transient lactic acidosis.

9.  Hypokalemia.

10.  Hyperbilirubinemia.

11.  Elevated prostate-specific antigen of greater than 90.

12.  Proteinuria, microscopic hematuria.

13.  Moderate pulmonary arterial hypertension with moderate tricuspid

regurgitation and elevated right ventricular systolic pressure of 52 mmHg.

14.  Bilateral lower extremity lymphedema and venous stasis, resolving.

15.  Lateral coronary ischemic changes.

16.  Constipation.

17.  Hypercholesteremia.

18.  Hypomagnesemia.

19.  Hyperuricemia.

1.  Atrial fibrillation with rapid ventricular response.

2.  Dilated cardiomyopathy with ejection fraction of 21%.

3.  Concentric left ventricular hypertrophy.

4.  Moderate to severely impaired left ventricular function with left

ventricle ejection fraction of 21%.

5.  Global left ventricular hypokinesis.

6.  Moderately reduced right ventricular systolic function.

7.  Moderately dilated left and right atrium.

8.  Moderately thickened aortic valve.

9.  Moderately thickened mitral valve with moderate mitral regurgitation.

10.  Moderate tricuspid regurgitation with moderate pulmonary arterial

hypertension with right ventricular systolic pressure of 52 mmHg.

11.  Atrial fibrillation with rapid ventricular response.

12.  Gait dysfunction.

13.  Bilateral lower extremity lymphedema and venous stasis.

14.  Leukopenia.

15.  Anemia.

16.  Lactic acidosis.

17.  Hypokalemia.

18.  Hyperbilirubinemia.

19.  Elevated prostate-specific antigen.

20.  Hyperbilirubinemia.

21.  Hyponatremia.

22.  Possible syndrome of inappropriate antidiuretic hormone.

23.  Systolic congestive heart failure with elevated BNP.

24.  Systolic and diastolic congestive heart failure with elevated BNP,

decreased left ventricular ejection fraction of 21% and moderate pulmonary

arterial hypertension, moderate tricuspid regurgitation.

1.  Hypertension.

2.  Atrial fibrillation with rapid ventricular response.

3.  Acute systolic and diastolic congestive heart failure with decreased

left ventricular ejection fraction of 20% and abnormal weight gain and

bilateral lower extremity lymphedema.

4. Leukopenia.

5.  Transient lactic acidosis.

6.  Hyponatremia, possibly syndrome of inappropriate antidiuretic hormone

secretion.

7.  Hypomagnesemia.

8.  Hyperbilirubinemia.

9.  Elevated prostate-specific antigen of greater than 90.

10.  Gait dysfunction.

11.  Deconditioning.

12.  Proteinuria, hematuria, bacteriuria.

13.  Axial skeleton diffuse osseous metastatic disease with abnormal

increased uptake of the thoracolumbar spine, right iliac bone, posterior

rib in the right rib, bilateral scapula, proximal femur bilaterally

including the lesser trochanter on the right and subtrochanteric region on

the left with increased uptake in the sternum.

14.  Cerebral cortical atrophy of the brain with moderate volume loss.

15.  Centrilobular and paraseptal emphysema with atelectasis scarring.

16.  Subcentimeter pulmonary nodules of 0.3 cm.

17.  Bilateral pleural effusion.

18.  Cardiomegaly.

19.  Scattered sclerotic bony lesion.

20.  Moderate diffuse stranding of the subcutaneous tissue and anasarca.

21.  Bilateral adrenal gland hypertrophy.

22.  Probable right renal cyst.

23.  Colonic diverticulosis.

24.  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

25.  Degenerative joint disease of the spine.

26.  Small fat-containing umbilical hernia.

27.  Small fat-containing inguinal hernia.

28.  Subcentimeter retroperitoneal lymphadenopathy.

29.  Dilated cardiomyopathy with left ventricular ejection fraction of 21%.

30.  Concentric left ventricular hypertrophy.

31.  Moderate-to-severely impaired left ventricular systolic function with

left ventricular ejection fraction of 21% and left ventricular global

hypokinesis.

32.  Moderately reduced right ventricular systolic function.

33.  Moderately dilated left and right atrium.

34.  Moderately thickened aortic valve.

35.  Moderately thickened mitral valve.

36.  Moderate mitral regurgitation.

37.  Moderate tricuspid regurgitation with moderate pulmonary arterial

hypertension with right ventricular systolic pressure of 52 mmHg.

38.  History of poor compliance and noncompliance.

39.  Hypervolemic hyponatremia secondary to systolic congestive heart

failure.

40.  Increased urinary retention.

41.  Possible and probable stage IV prostate carcinoma with bone metastasis

and extremely high PSA of greater than 90.

1.  Atrial fibrillation with rapid ventricular response.

2.  Noel-inferolateral coronary ischemia.

3.  History of hypertension.

4.  Most possible and most likely systolic congestive heart failure with

elevated BNP.

5.  Cardiomegaly and pulmonary vascular congestion, suggestive of

congestive heart failure.

6.  Leukopenia.

7.  Lactic acidosis.

8.  Hyponatremia, etiology undetermined.

9.  Questionable syndrome of inappropriate (excretion) of antidiuretic

hormone.

10.  Indeterminate troponin.

11.  Elevated BNP suggestive of congestive heart failure.

12.  Trace proteinuria, trace microscopic hematuria, trace bacteriuria.

13.  Bilateral lower extremity lymphedema.

14.  Anasarca.

15.  Extremely poor compliance and noncompliance.

16.  History of hypovitaminosis D.

17.  History of hyperuricemia, hypomagnesemia and history of elevated

prostate-specific antigen.



At present, I have had a lengthy discussion with the patient.  He is

agreeable for Transitional Care Unit if the patient is accepted to

Transitional Care Unit for aggressive rehab therapy.  The patient has

stated that he would prefer to stay in [place].  The patient has been

ordered repeat labs for the morning.  Current consultation:  Cardiology,

Nephrology, Urology, Hematology/Oncology.  Current medications:  Cardizem

 mg daily, Colace 100 mg three times a day, digoxin 0.125 daily,

Ecotrin 81 mg p.o. daily, Lipitor 40 mg daily, Lopressor 50 mg twice a day.

The patient is resumed on Lovenox 80 mg subcu q. 12 hours in anticipation

for a prostate biopsy by Urology.  Magnesium oxide 800 twice a day,

Protonix 40 mg daily, allopurinol 300 mg daily.  The patient is on oxygen 2

L continuous humidified.  The patient's diet will be increased to heart

healthy since the patient is started to move his bowels.  The patient will

be started on heart-healthy diet.  The patient will be continued on the

above therapeutic intervention.  At present, the patient will be considered

for transfer to TCU for aggressive therapy, ambulation and gait training. 

The patient has been updated about his condition, diagnoses, treatment plan

and recommendation.



Dictated and electronically signed, not read.





__________________________________________

Dale Márquez MD





DD:  02/16/2018 10:19:41

DT:  02/16/2018 12:17:09

Job # [id number]



MTDD

## 2018-02-17 LAB
ALBUMIN SERPL-MCNC: 3.3 G/DL (ref 3–4.8)
ALBUMIN/GLOB SERPL: 1 {RATIO} (ref 1.1–1.8)
ALT SERPL-CCNC: 40 U/L (ref 7–56)
APTT BLD: 45 SECONDS (ref 25.1–36.5)
AST SERPL-CCNC: 58 U/L (ref 17–59)
BASOPHILS # BLD AUTO: 0.05 K/MM3 (ref 0–2)
BASOPHILS NFR BLD: 1.3 % (ref 0–3)
BILIRUB DIRECT SERPL-MCNC: 0.9 MG/DL (ref 0–0.4)
BUN SERPL-MCNC: 19 MG/DL (ref 7–21)
CALCIUM SERPL-MCNC: 9.4 MG/DL (ref 8.4–10.5)
EOSINOPHIL # BLD: 0.2 10*3/UL (ref 0–0.7)
EOSINOPHIL NFR BLD: 4.8 % (ref 1.5–5)
ERYTHROCYTE [DISTWIDTH] IN BLOOD BY AUTOMATED COUNT: 15.3 % (ref 11.5–14.5)
GFR NON-AFRICAN AMERICAN: 45
GRANULOCYTES # BLD: 2.01 10*3/UL (ref 1.4–6.5)
GRANULOCYTES NFR BLD: 53.2 % (ref 50–68)
HGB BLD-MCNC: 12.4 G/DL (ref 14–18)
INR PPP: 1.53 (ref 0.93–1.08)
LYMPHOCYTES # BLD: 1 10*3/UL (ref 1.2–3.4)
LYMPHOCYTES NFR BLD AUTO: 25.1 % (ref 22–35)
MAGNESIUM SERPL-MCNC: 2.2 MG/DL (ref 1.7–2.2)
MCH RBC QN AUTO: 28.2 PG (ref 25–35)
MCHC RBC AUTO-ENTMCNC: 33.2 G/DL (ref 31–37)
MCV RBC AUTO: 85 FL (ref 80–105)
MONOCYTES # BLD AUTO: 0.6 10*3/UL (ref 0.1–0.6)
MONOCYTES NFR BLD: 15.6 % (ref 1–6)
PLATELET # BLD: 141 10^3/UL (ref 120–450)
PMV BLD AUTO: 10.2 FL (ref 7–11)
PROTHROMBIN TIME: 17.8 SECONDS (ref 9.4–12.5)
RBC # BLD AUTO: 4.4 10^6/UL (ref 3.5–6.1)
WBC # BLD AUTO: 3.8 10^3/UL (ref 4.5–11)

## 2018-02-17 RX ADMIN — Medication SCH MG: at 17:40

## 2018-02-17 RX ADMIN — PANTOPRAZOLE SODIUM SCH MG: 20 TABLET, DELAYED RELEASE ORAL at 16:16

## 2018-02-17 RX ADMIN — POLYETHYLENE GLYCOL 3350 SCH GM: 17 POWDER, FOR SOLUTION ORAL at 09:16

## 2018-02-17 RX ADMIN — Medication SCH MG: at 09:18

## 2018-02-17 RX ADMIN — ENOXAPARIN SODIUM SCH MG: 80 INJECTION SUBCUTANEOUS at 21:46

## 2018-02-17 RX ADMIN — PANTOPRAZOLE SODIUM SCH MG: 20 TABLET, DELAYED RELEASE ORAL at 06:32

## 2018-02-17 RX ADMIN — DILTIAZEM HYDROCHLORIDE SCH MG: 240 CAPSULE, COATED, EXTENDED RELEASE ORAL at 09:15

## 2018-02-17 RX ADMIN — DIGOXIN SCH MG: 0.12 TABLET ORAL at 15:00

## 2018-02-17 RX ADMIN — POLYETHYLENE GLYCOL 3350 SCH GM: 17 POWDER, FOR SOLUTION ORAL at 17:42

## 2018-02-17 RX ADMIN — ENOXAPARIN SODIUM SCH MG: 80 INJECTION SUBCUTANEOUS at 09:16

## 2018-02-17 NOTE — PN
DATE:  02/17/2018



SUBJECTIVE:  Patient was again seen in the morning between 8 and 9 a.m. 

Patient is seen lying in the bed in room 267, bed 2.  Patient is alert,

awake, responsive.  Patient is having breakfast.  Patient was found to be

alert, awake, oriented x3 overnight.  Slept well according to the nurses. 

Patient slept well without any distress.



PHYSICAL EXAMINATION:

VITAL SIGNS:  T-max 97.4, heart rate 87, 80, 84, blood pressure in the last

24 hours 116/73, 118/74, 121/74, respirations 20, O2 sat is 96% to 100%. 

Urine output 950.

HEENT:  Head examination:  Normocephalic, atraumatic.  HEENT examination

shows pink conjunctivae.  Anicteric sclerae.  No oropharyngeal lesion.  No

neck rigidity.

CHEST:  Kyphosis.

LUNGS:  Shows decreased breath sound at the bases.  No rales, crackles or

wheezing.

CARDIOVASCULAR:  S1, S2, regular rhythm.  Positive systolic murmur, left

sternal border, right second intercostal space, left second intercostal

space.

ABDOMEN:  Soft, protuberant.  Positive bowel sounds.

GENITALIA:  Male.

RECTAL:  Deferred.

EXTREMITIES:  Shows trace ankle swelling.  No pitting edema noted.  Patient

has not been using SILVIANO stockings and refusing SCDs as per the patient. 

Patient was seen and examined with the patient's nurses who were with the

room with me at the time of examination.  Patient was encouraged to use SILVIANO

stockings and wear SILVIANO stockings and also use SCDs while in bed and SCDs

needs to be connected to the device, which was advised to the patient's

nurses.

MUSCULOSKELETAL:  Shows a body mass index of 28.5.

NEUROLOGIC:  The patient is alert, awake, oriented x3.  He is able to move

upper and lower extremity without assistance.  Gait examination is not

tested.

VASCULAR:  Palpable pulses.



DIAGNOSTICS:  On 02/17, WBC 3.8, hemoglobin and hematocrit 12.4 and 37.4,

platelets 141.  PT 17.8, INR 1.53.  Sodium 131, potassium 4.7, chloride 93,

CO2 29, anion gap 15, BUN 19, creatinine 1.5, GFR 55, glucose 119, calcium

9.4, magnesium 2.2.  LFTs are normal.  Direct bili is down 2.9.



IMPRESSION AND PLAN:

1.  New-onset atrial fibrillation with rapid ventricular response.

2.  Acute systolic congestive heart failure.

3.  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

extremely elevated prostate-specific antigen.

4.  Small pleural effusion.

5.  Hypertension.

6.  Acute systolic congestive heart failure with elevated BNP and bilateral

lower extremity venous stasis.

7.  Leukopenia, anemia.

8.  Transient lactic acidosis.

9.  Hyponatremia, slow resolving.

10.  Non-hemolyzed hyperkalemia and hypokalemia.

11.  Hypochloremic hyponatremia.

12.  Hyperbilirubinemia.

13.  Transaminitis.

14.  Extremely elevated prostate-specific antigen of greater than 85 and

greater than 90.

15.  Gait dysfunction.

16.  Deconditioning.

17.  Transient acute kidney injury.

18.  Possible ileus pattern.

19.  Distended numerous large bowel loops with potential ileus pattern.

20.  Diffuse osteopenia and osteoporosis.

21.  Status post CAT scan guided L2 vertebral biopsy.

22.  Atrial fibrillation with rapid ventricular response.

23.  Constipation.

24.  Dyslipidemia.

25.  Hypomagnesemia.

26.  Hyperuricemia.

1.  Atrial fibrillation with rapid ventricular response.

2.  Acute systolic congestive heart failure with elevated BNP and bilateral

lower extremity lymphedema (resolved).

3.  Dilated cardiomyopathy with left ventricular ejection fraction of 20%

and pulmonary arterial hypertension with elevated right ventricular

systolic pressure of 52 mmHg.

4.  Moderate to severely impaired left ventricular systolic function and

global left ventricular hypokinesis.

5.  Concentric left ventricular hypertrophy.

6.  Moderately reduced right ventricular systolic function.

7.  Moderately dilated left atrium and moderately dilated right atrium.

8.  Moderately thickened aortic valve.

9.  Moderately thickened mitral valve.

10.  Moderate mitral regurgitation.

11.  Moderate tricuspid regurgitation and moderate pulmonary arterial

hypertension.

12.  Deconditioning.

13.  Gait dysfunction.

14.  Leukopenia, anemia.

15.  Slow resolving refractory hyponatremia.

16.  Lactic acidosis.

17.  Status post nonhemolyzed hyperkalemia.

18.  Status post acute kidney injury with chronic kidney disease stage 3.

19.  Transaminitis.

1.  Possible developing versus impending ileus involving large bowel with

distending large bowel loops and with large bowel loop gas.

2.  Moderate amount of gastric viscus gas.

3.  Diffuse osteopenia, osteoporosis.

4.  Atrial fibrillation with rapid ventricular response.

5.  Hyper and hypotension.

6.  Leukopenia, anemia.

7.  Persistent refractory hyponatremia, non-hemolyzed hyperkalemia.

8.  Transaminitis.

9.  Elevated prostate-specific antigen.

1.  Status post cystoscopy, insertion of 18-[place] two-way Fraire catheter. 

Status post cystoscopy, evacuation of clots and fulguration and retrograde

pyelogram.

2.  Atrial fibrillation with rapid ventricular response, resolving.

3.  Abdominal distention, etiology undetermined versus constipation.

4.  Leukopenia.

5.  Normocytic anemia.

6.  Mild coagulopathy.

7.  Transient lactic acidosis.

8.  Persistent refractory hyponatremia.

9.  Non-hemolyzed hyperkalemia.

10.  Hypochloremic hyponatremia.

11.  Hyperbilirubinemia.

12.  Transaminitis.

13.  Elevated prostate-specific antigen.

14.  Gait dysfunction.

15.  Deconditioning.

16.  Proteinuria, hematuria, bacteriuria.

17.  Status post CT-guided L2 vertebral body biopsy.

18.  Hypervolemic hyponatremia, probably secondary to congestive heart

failure versus secondary to metastatic carcinoma.

19.  Syndrome of inappropriate antidiuretic hormone.

20.  Hypertensive kidney disease.

21.  Acute kidney injury with underlying chronic kidney disease.

22.  Systolic congestive heart failure and cardiomyopathy.

23.  Prostatic hypertrophy.

24.  New-onset atrial fibrillation with rapid ventricular response.

25.  Suspected metastases prostate carcinoma with extremely elevated PSA

and bone metastasis.

1.  Atrial fibrillation with rapid ventricular response.

2.  Transient hypotension (resolved).

3.  Leukopenia, anemia.

4.  Transient lactic acidosis.

5.  Refractory hyponatremia.

6.  Hyperkalemia.

7.  Hypomagnesemia.

8.  Hyperbilirubinemia.

9.  Most likely suspected metastatic prostate carcinoma with bone

metastasis with elevated PSA.

10.  Trace proteinuria, microscopic hematuria.

11.  Status post CT-guided lumbar spine biopsy.

12.  Atrial fibrillation with rapid ventricular response.

13.  Voiding dysfunction.

14.  Gait dysfunction.

15.  Systolic congestive heart failure (resolving).

16.  Constipation.

17.  Dyslipidemia.

18.  Hypomagnesemia.

19.  Hyperuricemia.

20.  Deconditioning.

1.  Atrial fibrillation with rapid ventricular response.

2.  Suspect metastatic prostate carcinoma with bone metastasis.

3.  Pleural effusion.

4.  Leukopenia.

5.  Anemia.

6.  Refractory hyponatremia.

7.  Hypervolemic hyponatremia secondary to congestive heart failure.

8.  Possible syndrome of inappropriate antidiuretic hormone.

9.  Hypertensive chronic kidney disease.

10.  Systolic congestive heart failure with cardiomyopathy.

11.  Hypokalemia.

12.  Hypomagnesemia.

13.  Normocytic anemia.

14.  Granulocytosis.

15.  Transient lactic acidosis.

16.  Hypokalemia.

17.  Hyponatremia.

18.  Hypomagnesemia.

19.  Hyperbilirubinemia.

20.  Transient hypotension.

21.  Constipation.

22.  Hypercholesteremia.

23.  Hypomagnesemia.

24.  Hyperuricemia.

1.  New-onset atrial fibrillation with rapid ventricular response.

2.  Dilated cardiomyopathy.

3.  Acute systolic congestive heart failure with elevated brain natriuretic

peptide.

4.  Hyponatremia.

5.  Questionable syndrome of inappropriate antidiuretic hormone secretion.

6.  Most probable metastatic prostate carcinoma with multiple bone

metastases.

7.  Leukopenia and anemia.

8.  Transient lactic acidosis.

9.  Hypokalemia.

10.  Hyperbilirubinemia.

11.  Elevated prostate-specific antigen of greater than 90.

12.  Proteinuria, microscopic hematuria.

13.  Moderate pulmonary arterial hypertension with moderate tricuspid

regurgitation and elevated right ventricular systolic pressure of 52 mmHg.

14.  Bilateral lower extremity lymphedema and venous stasis, resolving.

15.  Lateral coronary ischemic changes.

16.  Constipation.

17.  Hypercholesteremia.

18.  Hypomagnesemia.

19.  Hyperuricemia.

1.  Atrial fibrillation with rapid ventricular response.

2.  Dilated cardiomyopathy with ejection fraction of 21%.

3.  Concentric left ventricular hypertrophy.

4.  Moderate to severely impaired left ventricular function with left

ventricle ejection fraction of 21%.

5.  Global left ventricular hypokinesis.

6.  Moderately reduced right ventricular systolic function.

7.  Moderately dilated left and right atrium.

8.  Moderately thickened aortic valve.

9.  Moderately thickened mitral valve with moderate mitral regurgitation.

10.  Moderate tricuspid regurgitation with moderate pulmonary arterial

hypertension with right ventricular systolic pressure of 52 mmHg.

11.  Atrial fibrillation with rapid ventricular response.

12.  Gait dysfunction.

13.  Bilateral lower extremity lymphedema and venous stasis.

14.  Leukopenia.

15.  Anemia.

16.  Lactic acidosis.

17.  Hypokalemia.

18.  Hyperbilirubinemia.

19.  Elevated prostate-specific antigen.

20.  Hyperbilirubinemia.

21.  Hyponatremia.

22.  Possible syndrome of inappropriate antidiuretic hormone.

23.  Systolic congestive heart failure with elevated BNP.

24.  Systolic and diastolic congestive heart failure with elevated BNP,

decreased left ventricular ejection fraction of 21% and moderate pulmonary

arterial hypertension, moderate tricuspid regurgitation.

1.  Hypertension.

2.  Atrial fibrillation with rapid ventricular response.

3.  Acute systolic and diastolic congestive heart failure with decreased

left ventricular ejection fraction of 20% and abnormal weight gain and

bilateral lower extremity lymphedema.

4. Leukopenia.

5.  Transient lactic acidosis.

6.  Hyponatremia, possibly syndrome of inappropriate antidiuretic hormone

secretion.

7.  Hypomagnesemia.

8.  Hyperbilirubinemia.

9.  Elevated prostate-specific antigen of greater than 90.

10.  Gait dysfunction.

11.  Deconditioning.

12.  Proteinuria, hematuria, bacteriuria.

13.  Axial skeleton diffuse osseous metastatic disease with abnormal

increased uptake of the thoracolumbar spine, right iliac bone, posterior

rib in the right rib, bilateral scapula, proximal femur bilaterally

including the lesser trochanter on the right and subtrochanteric region on

the left with increased uptake in the sternum.

14.  Cerebral cortical atrophy of the brain with moderate volume loss.

15.  Centrilobular and paraseptal emphysema with atelectasis scarring.

16.  Subcentimeter pulmonary nodules of 0.3 cm.

17.  Bilateral pleural effusion.

18.  Cardiomegaly.

19.  Scattered sclerotic bony lesion.

20.  Moderate diffuse stranding of the subcutaneous tissue and anasarca.

21.  Bilateral adrenal gland hypertrophy.

22.  Probable right renal cyst.

23.  Colonic diverticulosis.

24.  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

25.  Degenerative joint disease of the spine.

26.  Small fat-containing umbilical hernia.

27.  Small fat-containing inguinal hernia.

28.  Subcentimeter retroperitoneal lymphadenopathy.

29.  Dilated cardiomyopathy with left ventricular ejection fraction of 21%.

30.  Concentric left ventricular hypertrophy.

31.  Moderate-to-severely impaired left ventricular systolic function with

left ventricular ejection fraction of 21% and left ventricular global

hypokinesis.

32.  Moderately reduced right ventricular systolic function.

33.  Moderately dilated left and right atrium.

34.  Moderately thickened aortic valve.

35.  Moderately thickened mitral valve.

36.  Moderate mitral regurgitation.

37.  Moderate tricuspid regurgitation with moderate pulmonary arterial

hypertension with right ventricular systolic pressure of 52 mmHg.

38.  History of poor compliance and noncompliance.

39.  Hypervolemic hyponatremia secondary to systolic congestive heart

failure.

40.  Increased urinary retention.

41.  Possible and probable stage IV prostate carcinoma with bone metastasis

and extremely high PSA of greater than 90.

1.  Atrial fibrillation with rapid ventricular response.

2.  Noel-inferolateral coronary ischemia.

3.  History of hypertension.

4.  Most possible and most likely systolic congestive heart failure with

elevated BNP.

5.  Cardiomegaly and pulmonary vascular congestion, suggestive of

congestive heart failure.

6.  Leukopenia.

7.  Lactic acidosis.

8.  Hyponatremia, etiology undetermined.

9.  Questionable syndrome of inappropriate (excretion) of antidiuretic

hormone.

10.  Indeterminate troponin.

11.  Elevated BNP suggestive of congestive heart failure.

12.  Trace proteinuria, trace microscopic hematuria, trace bacteriuria.

13.  Bilateral lower extremity lymphedema.

14.  Anasarca.

15.  Extremely poor compliance and noncompliance.

16.  History of hypovitaminosis D.

17.  History of hyperuricemia, hypomagnesemia and history of elevated

prostate-specific antigen.



PLAN:  At this time, the patient was ordered out of bed to chair, SILVIANO

stockings, SCDs.  Patient has been ordered serial labs.



CURRENT CONSULTATION:  Cardiology and Nephrology.



CURRENT MEDICATIONS:

1.  Cardizem  mg daily.

2.  Colace 100 mg 3 times a day.

3.  Cozaar 25 mg daily.

4.  Digoxin 0.125 mg daily.

5.  Aspirin 81 mg daily.

6.  Lipitor 40 mg p.o. daily.

7.  Lopressor 50 mg twice a day.

8.  Lovenox 80 mg subcu q.12.

9.  Magnesium oxide 800 mg twice a day.

10.  MiraLax 17 g twice a day.

11.  Protonix 40 mg daily.

12.  Tolvaptan 30 mg daily.

13.  Zofran 4 mg IV q.4 p.r.n.

14.  Allopurinol 300 mg daily,



At present, the patient is awaiting for subacute rehab placement versus TCU

placement.



In the meantime, the patient will be continued to be monitored.  Patient

has been ordered out of bed to chair.  Patient has been ordered physical

therapy, ambulation therapy, gait training.



Patient updated about his condition, diagnosis, treatment plan, management

plan, which he acknowledged and understand.  Patient promises to comply.



Patient has been extensively explained about his diagnostic test results

and diagnosis.



Dictated and electronically signed, not read.







__________________________________________

Dale Márquez MD



DD:  02/17/2018 16:38:44

DT:  02/17/2018 16:46:03

Job # [id number]

[id number]

## 2018-02-17 NOTE — CP.PCM.PN
Subjective





- Date & Time of Evaluation


Date of Evaluation: 02/17/18


Time of Evaluation: 09:00





- Subjective


Subjective: 





Follow up Nephrology Consultation Note





Assessment: Stable


Hypervolemic hyponatremia likely due to CHF and ? contribution by metastatic 

cancer leading to excess ADH (SIADH)


Hypertensive Chronic Kidney Disease (I12.9)





Chronic Kidney Disease (N18.3) Stage 3 with Cr 1.2 with superimposed mild SUSSY


metastatic cancer, HTN (I12.9)


CHF LVEF 20%


Hypokalemia hypomagnesemia, hx of BPH





Plan


No acute need for renal replacement therapy at this time. 


Hypertension control with meds as ordered. started low dose losartan.


Monitor Input/Output, daily weights and renal function with basic metabolic 

panel


supplement electrolytes as needed


continue with tolvaptan 30 mg while in hospital and at d/c. Rx was given to CM. 

Avoid correction in serum Na > 6-8 meq/24 hrs


should be able to resume diuretics as lasix soon once renal function stable.


Avoid nephrotoxins/NSAIDs


Glycemic control


Further work up for as per primary team


pt stable for d/c from renal perspective when planned





Thanks for allowing me to participate in care of your patient. Will follow 

patient with you. Please call if any Qs. had d/w team


Dr Rickey Tapia


Office: [phone] Cell: [phone] Fax: [phone]





Subjective: Noted events overnight. Patients feels okay. 


Denies chest pain, palpitation, improved shortness of breath, leg swelling. 


All other negative. had bone biopsy by IR on 2/13/18





Physical Examination:      


General Appearance: Comfortable, in no acute respiratory distress, co-operative 

. 


Vitals reviewed and noted as below


Head; Atraumatic, normocephalic


ENT: no ulcers no thrush. Tongue is midline. Oropharynx: no rash or ulcers.


EYES: Pupils are equal, round and reactive to light accommodation. Eye muscles 

and extraocular movement intact. Sclera is anicteric.


Neck; supple no lymphadenopathy, no thyromegaly or bruit


Lungs: Normal respiratory rate/effort. Breath sounds bilateral improved


Heart: Normal rate. s1s2 normal. No rub or gallop. 


Extremities: no edema. No varicose veins


Neurological: Patient is alert, awake and oriented to person, place and time. 

No focal deficit. Strength bilateral appropriate and equal


Skin: Warm and dry. Normal turgor. No rash. Palpitation: Normal elasticity for 

age


Abdomen: Abdomen is soft. Bowel sounds +. There is no abdominal tenderness, no 

guarding/rigidity no organomegaly


Psych: normal insight and normal affect/mood


MSK: no joint tenderness or swelling. Digits and nails normal, no deformity


: kidney or bladder not palpable





Labs/imaging reviewed.


Past medical history, past surgical history, family history, social history, 

allergy reviewed and noted as below


Family hx: no hx of CKD. Rest non-contributory 





urine Na 75 urine osmol 190 Serum osmol 281 uric acid 7.9 TSH 2.3 LVEF 20%





Objective





- Vital Signs/Intake and Output


Vital Signs (last 24 hours): 


 











Temp Pulse Resp BP Pulse Ox


 


 97.1 F L  87   20   114/68   96 


 


 02/17/18 12:00  02/17/18 12:00  02/17/18 12:00  02/17/18 12:00  02/17/18 05:35








Intake and Output: 


 











 02/17/18 02/17/18





 06:59 18:59


 


Intake Total 240 


 


Output Total 1100 


 


Balance -860 














- Medications


Medications: 


 Current Medications





Allopurinol (Zyloprim)  300 mg PO DAILY [facility]


   Last Admin: 02/17/18 09:28 Dose:  300 mg


Aspirin (Ecotrin)  81 mg PO DAILY [facility]


   Last Admin: 02/17/18 09:17 Dose:  81 mg


Atorvastatin Calcium (Lipitor)  40 mg PO DIN [facility]


   Last Admin: 02/16/18 18:13 Dose:  40 mg


Digoxin (Digoxin)  0.125 mg PO 1400 [facility]


   Last Admin: 02/17/18 15:00 Dose:  0.125 mg


Diltiazem HCl (Cardizem Cd)  240 mg PO DAILY [facility]


   Last Admin: 02/17/18 09:15 Dose:  240 mg


Docusate Sodium (Colace)  100 mg PO TID [facility]


   Last Admin: 02/17/18 15:00 Dose:  100 mg


Enoxaparin Sodium (Lovenox)  80 mg SC Q12H [facility]


   PRN Reason: Protocol


   Last Admin: 02/17/18 09:16 Dose:  80 mg


Losartan Potassium (Cozaar)  25 mg PO QPM [facility]


   Last Admin: 02/16/18 18:14 Dose:  Not Given


Magnesium Oxide (Mag-Ox)  800 mg PO BID [facility]


   Last Admin: 02/17/18 09:18 Dose:  800 mg


Metoprolol Tartrate (Lopressor)  50 mg PO BID [facility]


   Last Admin: 02/17/18 09:18 Dose:  50 mg


Ondansetron HCl (Zofran Inj)  4 mg IVP ONCE PRN


   PRN Reason: Nausea/Vomiting


Ondansetron HCl (Zofran Inj)  4 mg IVP Q4H PRN


   PRN Reason: Nausea/Vomiting


Pantoprazole Sodium (Protonix Ec Tab)  20 mg PO 0600,1600 [facility]


   Last Admin: 02/17/18 16:16 Dose:  20 mg


Polyethylene Glycol (Miralax)  17 gm PO BID [facility]


   Last Admin: 02/17/18 09:16 Dose:  17 gm


Tolvaptan (Samsca)  30 mg PO DAILY [facility]


   Stop: 02/18/18 10:28


   Last Admin: 02/17/18 09:28 Dose:  30 mg











- Labs


Labs: 


 





 02/17/18 06:00 





 02/17/18 06:00 





 











PT  17.8 SECONDS (9.4-12.5)  H  02/17/18  06:00    


 


INR  1.53  (0.93-1.08)  H  02/17/18  06:00    


 


APTT  45.0 Seconds (25.1-36.5)  H  02/17/18  06:00

## 2018-02-17 NOTE — CP.PCM.PN
Subjective





- Date & Time of Evaluation


Date of Evaluation: 02/17/18


Time of Evaluation: 19:00





- Subjective


Subjective: 





No complaints.





Objective





- Vital Signs/Intake and Output


Vital Signs (last 24 hours): 


 











Temp Pulse Resp BP Pulse Ox


 


 97.4 F L  89   20   117/67   96 


 


 02/17/18 17:26  02/17/18 17:41  02/17/18 17:26  02/17/18 17:41  02/17/18 05:35











- Medications


Medications: 


 Current Medications





Allopurinol (Zyloprim)  300 mg PO DAILY [facility]


   Last Admin: 02/17/18 09:28 Dose:  300 mg


Aspirin (Ecotrin)  81 mg PO DAILY [facility]


   Last Admin: 02/17/18 09:17 Dose:  81 mg


Atorvastatin Calcium (Lipitor)  40 mg PO DIN [facility]


   Last Admin: 02/17/18 17:41 Dose:  40 mg


Digoxin (Digoxin)  0.125 mg PO 1400 [facility]


   Last Admin: 02/17/18 15:00 Dose:  0.125 mg


Diltiazem HCl (Cardizem Cd)  240 mg PO DAILY [facility]


   Last Admin: 02/17/18 09:15 Dose:  240 mg


Docusate Sodium (Colace)  100 mg PO TID [facility]


   Last Admin: 02/17/18 19:03 Dose:  Not Given


Enoxaparin Sodium (Lovenox)  80 mg SC Q12H [facility]


   PRN Reason: Protocol


   Last Admin: 02/17/18 09:16 Dose:  80 mg


Ergocalciferol (Drisdol 50,000 Intl Units Cap)  1 cap PO Q7D [facility]


Losartan Potassium (Cozaar)  25 mg PO QPM [facility]


   Last Admin: 02/17/18 17:41 Dose:  25 mg


Magnesium Oxide (Mag-Ox)  800 mg PO BID [facility]


   Last Admin: 02/17/18 17:40 Dose:  800 mg


Metoprolol Tartrate (Lopressor)  50 mg PO BID [facility]


   Last Admin: 02/17/18 17:41 Dose:  50 mg


Ondansetron HCl (Zofran Inj)  4 mg IVP ONCE PRN


   PRN Reason: Nausea/Vomiting


Ondansetron HCl (Zofran Inj)  4 mg IVP Q4H PRN


   PRN Reason: Nausea/Vomiting


Pantoprazole Sodium (Protonix Ec Tab)  20 mg PO 0600,1600 [facility]


   Last Admin: 02/17/18 16:16 Dose:  20 mg


Polyethylene Glycol (Miralax)  17 gm PO BID [facility]


   Last Admin: 02/17/18 17:42 Dose:  17 gm


Tolvaptan (Samsca)  30 mg PO DAILY [facility]


   Stop: 02/18/18 10:28


   Last Admin: 02/17/18 09:28 Dose:  30 mg











- Labs


Labs: 


 





 02/17/18 06:00 





 02/17/18 06:00 





 











PT  17.8 SECONDS (9.4-12.5)  H  02/17/18  06:00    


 


INR  1.53  (0.93-1.08)  H  02/17/18  06:00    


 


APTT  45.0 Seconds (25.1-36.5)  H  02/17/18  06:00    














- Head Exam


Head Exam: ATRAUMATIC





- Eye Exam


Eye Exam: Normal appearance





- ENT Exam


ENT Exam: Mucous Membranes Dry





- Respiratory Exam


Respiratory Exam: NORMAL BREATHING PATTERN





- Cardiovascular Exam


Cardiovascular Exam: +S1, +S2





- GI/Abdominal Exam


GI & Abdominal Exam: Normal Bowel Sounds





- Extremities Exam


Extremities Exam: Pedal Edema





Assessment and Plan


(1) Bone lesion


Assessment & Plan: 


s/p biopsy


? metastatic prostate cancer


f/u path


Status: Acute   





(2) Pleural effusion


Assessment & Plan: 


comfortable at rest


Status: Acute   





(3) Anemia


Assessment & Plan: 


chronic disease, likely bone mets, mild CKD


Status: Acute

## 2018-02-18 VITALS — RESPIRATION RATE: 18 BRPM

## 2018-02-18 RX ADMIN — Medication SCH MG: at 11:00

## 2018-02-18 RX ADMIN — DIGOXIN SCH MG: 0.12 TABLET ORAL at 14:30

## 2018-02-18 RX ADMIN — POLYETHYLENE GLYCOL 3350 SCH GM: 17 POWDER, FOR SOLUTION ORAL at 17:49

## 2018-02-18 RX ADMIN — ENOXAPARIN SODIUM SCH MG: 80 INJECTION SUBCUTANEOUS at 11:07

## 2018-02-18 RX ADMIN — DILTIAZEM HYDROCHLORIDE SCH MG: 240 CAPSULE, COATED, EXTENDED RELEASE ORAL at 11:01

## 2018-02-18 RX ADMIN — PANTOPRAZOLE SODIUM SCH MG: 20 TABLET, DELAYED RELEASE ORAL at 05:13

## 2018-02-18 RX ADMIN — ENOXAPARIN SODIUM SCH MG: 80 INJECTION SUBCUTANEOUS at 20:44

## 2018-02-18 RX ADMIN — POLYETHYLENE GLYCOL 3350 SCH GM: 17 POWDER, FOR SOLUTION ORAL at 11:00

## 2018-02-18 RX ADMIN — Medication SCH MG: at 17:48

## 2018-02-18 RX ADMIN — PANTOPRAZOLE SODIUM SCH MG: 20 TABLET, DELAYED RELEASE ORAL at 16:43

## 2018-02-18 NOTE — PN
DATE:



SUBJECTIVE:  The patient is a 77-year-old  male.  He is in the

[facility] in [place], admitted few days ago with congestive

heart failure, new onset atrial fibrillation, hypertension.  The patient is

seen this morning.  He seems to be comfortable.  He has no additional

complaints.



PHYSICAL EXAMINATION:

VITAL SIGNS:  His pulse is 87, AFib, blood pressure 121/73, respirations

are 20, O2 sat 100% on 2 L of oxygen.

HEENT:  Head is normocephalic.  The patient has no deficits in the scalp or

neck area.

NECK:  The patient's thyroid is not enlarged.

LUNGS:  Clear.  Trachea is central.  Breath sounds are vesicular. 

Crepitations heard in the basal area.

HEART:  Atrial fibrillation with moderate ventricular response at this

time.

ABDOMEN:  Soft.  Liver and spleen not palpable clinically.  There is no

evidence of any ascites.

CENTRAL NERVOUS SYSTEM:  The patient is conscious, rational and  oriented. 

No evidence of any cranial nerve abnormalities.  He has some gait

disability.



He has history of gout in the past.  The patient also has history of

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  The patient

has history of hypertension and degenerative arthritis.



MEDICATIONS:  List consist of Cardizem 240 mg daily.  The patient is on

Lasix 100 mg t.i.d., Losartan 25 mg daily.  The patient is on digoxin 125

mcg daily.  Vitamin D.  The patient is on aspirin 81 mg daily, Lipitor 40

mg daily, metoprolol 50 mg daily, Lovenox 80 mg subcutaneous q.12 hours. 

The patient is on magnesium oxide, Miralax, pantoprazole 20 mg daily.  The

patient is on Samsca (tolvaptan) possibly for prostrate cancer.  The

patient is getting allopurinol 300 mg daily also in the hospital.



ASSESSMENT AND PLAN:  The patient's diet is heart healthy.  The patient is

clinically stable.  We will continue current management and follow up.







__________________________________________

Derian Mercer MD





DD:  02/18/2018 10:21:23

DT:  02/18/2018 12:17:27

Job # [id number]





[email or web address]

## 2018-02-19 LAB
ALBUMIN SERPL-MCNC: 3.5 G/DL (ref 3–4.8)
ALBUMIN/GLOB SERPL: 1 {RATIO} (ref 1.1–1.8)
ALT SERPL-CCNC: 41 U/L (ref 7–56)
APTT BLD: 43.5 SECONDS (ref 25.1–36.5)
AST SERPL-CCNC: 58 U/L (ref 17–59)
BASOPHILS # BLD AUTO: 0.04 K/MM3 (ref 0–2)
BASOPHILS NFR BLD: 0.9 % (ref 0–3)
BILIRUB DIRECT SERPL-MCNC: 0.9 MG/DL (ref 0–0.4)
BUN SERPL-MCNC: 15 MG/DL (ref 7–21)
BUN SERPL-MCNC: 16 MG/DL (ref 7–21)
CALCIUM SERPL-MCNC: 9.8 MG/DL (ref 8.4–10.5)
CALCIUM SERPL-MCNC: 9.8 MG/DL (ref 8.4–10.5)
EOSINOPHIL # BLD: 0.1 10*3/UL (ref 0–0.7)
EOSINOPHIL NFR BLD: 2.3 % (ref 1.5–5)
ERYTHROCYTE [DISTWIDTH] IN BLOOD BY AUTOMATED COUNT: 15.4 % (ref 11.5–14.5)
GFR NON-AFRICAN AMERICAN: 45
GFR NON-AFRICAN AMERICAN: 45
GRANULOCYTES # BLD: 2.32 10*3/UL (ref 1.4–6.5)
GRANULOCYTES NFR BLD: 52.2 % (ref 50–68)
HGB BLD-MCNC: 13.3 G/DL (ref 14–18)
INR PPP: 1.44 (ref 0.93–1.08)
LYMPHOCYTES # BLD: 1.2 10*3/UL (ref 1.2–3.4)
LYMPHOCYTES NFR BLD AUTO: 26.4 % (ref 22–35)
MAGNESIUM SERPL-MCNC: 2.2 MG/DL (ref 1.7–2.2)
MCH RBC QN AUTO: 28.3 PG (ref 25–35)
MCHC RBC AUTO-ENTMCNC: 32.9 G/DL (ref 31–37)
MCV RBC AUTO: 86 FL (ref 80–105)
MONOCYTES # BLD AUTO: 0.8 10*3/UL (ref 0.1–0.6)
MONOCYTES NFR BLD: 18.2 % (ref 1–6)
PLATELET # BLD: 158 10^3/UL (ref 120–450)
PMV BLD AUTO: 9.9 FL (ref 7–11)
PROTHROMBIN TIME: 16.7 SECONDS (ref 9.4–12.5)
RBC # BLD AUTO: 4.7 10^6/UL (ref 3.5–6.1)
WBC # BLD AUTO: 4.4 10^3/UL (ref 4.5–11)

## 2018-02-19 RX ADMIN — Medication SCH MG: at 19:51

## 2018-02-19 RX ADMIN — POLYETHYLENE GLYCOL 3350 SCH GM: 17 POWDER, FOR SOLUTION ORAL at 12:23

## 2018-02-19 RX ADMIN — ENOXAPARIN SODIUM SCH MG: 80 INJECTION SUBCUTANEOUS at 11:30

## 2018-02-19 RX ADMIN — DILTIAZEM HYDROCHLORIDE SCH MG: 240 CAPSULE, COATED, EXTENDED RELEASE ORAL at 12:18

## 2018-02-19 RX ADMIN — PANTOPRAZOLE SODIUM SCH MG: 20 TABLET, DELAYED RELEASE ORAL at 05:50

## 2018-02-19 RX ADMIN — Medication SCH MG: at 12:22

## 2018-02-19 RX ADMIN — POLYETHYLENE GLYCOL 3350 SCH: 17 POWDER, FOR SOLUTION ORAL at 19:51

## 2018-02-19 RX ADMIN — PANTOPRAZOLE SODIUM SCH MG: 20 TABLET, DELAYED RELEASE ORAL at 19:51

## 2018-02-19 RX ADMIN — DIGOXIN SCH MG: 0.12 TABLET ORAL at 14:00

## 2018-02-19 NOTE — PN
DATE:  02/15/2018



UROLOGY PROGRESS NOTE



See the previously dictated consult notes and operative reports from 02/14.



SUBJECTIVE:  Mr. Green is currently resting comfortably.



Eating.



He has a Fraire catheter in place, draining well.



PAST MEDICAL HISTORY:  No other changes.



PAST SURGICAL HISTORY:  No other changes.



REVIEW OF SYSTEMS:  No change.



PHYSICAL EXAMINATION:  No change.



UROLOGY DIAGNOSES:  Urinary retention, voiding dysfunction, gross

hematuria, elevated prostate surface antigen.



PLAN:  As follows:  We checked the pathology, it is still pending.



They gave a verbal, but it is really pending and I do not want to act upon

this.



So, at this point, no urology changes.  We are waiting to see _____ if the

biopsy from the bone is prostate cancer.  I did have a chance to speak to

Dr. Adam Shine who thinks he has got a good bone biopsy and sample.



So, we are going to wait for the pathology.



Once we know the pathology, then we can make the next recommendation.  In

the event that the bone biopsy shows prostate cancer, no prostate biopsy

will be done.  In the event that it does not, we will consider prostate

ultrasound guided biopsy.



The other part of importance is the patient's gross hematuria, urinary

retention, and voiding dysfunction.



We have discussed options with the patient including Flomax and a voiding

trial versus the possibility for TURP.  For now, the patient does not want

the catheter back and forth and back and forth, he wants one treatment

_____ himself.  We will have to discuss this in terms of medical clearance.

We will discuss this further with Dr. Márquez.









In the interim, the plan is for Fraire to straight drainage, await for

pathology and then discuss workup and treatment for the prostate.





__________________________________________

Kevin Gray MD



DD:  02/19/2018 6:36:57

DT:  02/19/2018 8:08:05

Job # [id number]

## 2018-02-19 NOTE — CP.PCM.PN
Subjective





- Date & Time of Evaluation


Date of Evaluation: 02/19/18


Time of Evaluation: 15:39





- Subjective


Subjective: 





Follow up Nephrology Consultation Note





Assessment: Stable


Hypervolemic hyponatremia likely due to CHF and ? contribution by metastatic 

cancer leading to excess ADH (SIADH)


Hypertensive Chronic Kidney Disease (I12.9)





Chronic Kidney Disease (N18.3) Stage 3 with Cr 1.2 with superimposed mild SUSSY


metastatic cancer, HTN (I12.9)


CHF LVEF 20%


Hypokalemia hypomagnesemia, hx of BPH





Plan


No acute need for renal replacement therapy at this time. 


Hypertension control with meds as ordered. started low dose losartan but d/c 

due to hyperkalemia.


Monitor Input/Output, daily weights and renal function with basic metabolic 

panel


supplement electrolytes as needed


continue with tolvaptan 30 mg at d/c.


agree with lasix soon 


Avoid nephrotoxins/NSAIDs


Glycemic control


Further work up for as per primary team


pt stable for d/c from renal perspective when planned





Thanks for allowing me to participate in care of your patient. Will follow 

patient with you. Please call if any Qs. 


Dr Rickey Tapia


Office: [phone] Cell: [phone] Fax: [phone]





Subjective: Noted events overnight. Patients feels okay. 


Denies chest pain, palpitation, c/o exertional shortness of breath, denies leg 

swelling. 


All other negative. had bone biopsy by IR on 2/13/18





Physical Examination:      


General Appearance: Comfortable, in no acute respiratory distress, co-operative 

. 


Vitals reviewed and noted as below


Head; Atraumatic, normocephalic


ENT: no ulcers no thrush. Tongue is midline. Oropharynx: no rash or ulcers.


EYES: Pupils are equal, round and reactive to light accommodation. Eye muscles 

and extraocular movement intact. Sclera is anicteric.


Neck; supple no lymphadenopathy, no thyromegaly or bruit


Lungs: Normal respiratory rate/effort. Breath sounds reduced at rt base


Heart: Normal rate. s1s2 normal. No rub or gallop. 


Extremities: no edema. No varicose veins


Neurological: Patient is alert, awake and oriented to person, place and time. 

No focal deficit. Strength bilateral appropriate and equal


Skin: Warm and dry. Normal turgor. No rash. Palpitation: Normal elasticity for 

age


Abdomen: Abdomen is soft. Bowel sounds +. There is no abdominal tenderness, no 

guarding/rigidity no organomegaly


Psych: normal insight and normal affect/mood


MSK: no joint tenderness or swelling. Digits and nails normal, no deformity


: kidney or bladder not palpable





Labs/imaging reviewed.


Past medical history, past surgical history, family history, social history, 

allergy reviewed and noted as below


Family hx: no hx of CKD. Rest non-contributory 





urine Na 75 urine osmol 190 Serum osmol 281 uric acid 7.9 TSH 2.3 LVEF 20%








Objective





- Vital Signs/Intake and Output


Vital Signs (last 24 hours): 


 











Temp Pulse Resp BP Pulse Ox


 


 97.9 F   88   18   115/88   100 


 


 02/19/18 06:00  02/19/18 06:00  02/19/18 06:00  02/19/18 06:00  02/19/18 06:00











- Medications


Medications: 


 Current Medications





Allopurinol (Zyloprim)  300 mg PO DAILY [facility]


   Last Admin: 02/19/18 12:23 Dose:  300 mg


Apixaban (Eliquis)  2.5 mg PO BID [facility]


   PRN Reason: Protocol


Aspirin (Ecotrin)  81 mg PO DAILY [facility]


   Last Admin: 02/19/18 12:22 Dose:  81 mg


Atorvastatin Calcium (Lipitor)  40 mg PO DIN [facility]


   Last Admin: 02/18/18 17:49 Dose:  40 mg


Digoxin (Digoxin)  0.125 mg PO 1400 [facility]


   Last Admin: 02/18/18 14:30 Dose:  0.125 mg


Diltiazem HCl (Cardizem Cd)  240 mg PO DAILY [facility]


   Last Admin: 02/19/18 12:18 Dose:  240 mg


Docusate Sodium (Colace)  100 mg PO TID [facility]


   Last Admin: 02/19/18 12:22 Dose:  100 mg


Ergocalciferol (Drisdol 50,000 Intl Units Cap)  1 cap PO Q7D [facility]


   Last Admin: 02/17/18 21:46 Dose:  1 cap


Furosemide (Lasix)  40 mg IVP DAILY [facility]


Magnesium Oxide (Mag-Ox)  800 mg PO BID [facility]


   Last Admin: 02/19/18 12:22 Dose:  800 mg


Metoprolol Tartrate (Lopressor)  50 mg PO BID [facility]


   Last Admin: 02/19/18 12:22 Dose:  50 mg


Ondansetron HCl (Zofran Inj)  4 mg IVP Q4H PRN


   PRN Reason: Nausea/Vomiting


Pantoprazole Sodium (Protonix Ec Tab)  20 mg PO 0600,1600 [facility]


   Last Admin: 02/19/18 05:50 Dose:  20 mg


Polyethylene Glycol (Miralax)  17 gm PO BID [facility]


   Last Admin: 02/19/18 12:23 Dose:  17 gm











- Labs


Labs: 


 





 02/19/18 07:00 





 02/19/18 12:10 





 











PT  16.7 SECONDS (9.4-12.5)  H  02/19/18  07:00    


 


INR  1.44  (0.93-1.08)  H  02/19/18  07:00    


 


APTT  43.5 Seconds (25.1-36.5)  H  02/19/18  07:00

## 2018-02-19 NOTE — PN
DATE:  02/19/2018



CARDIOLOGY FOLLOWUP



SUBJECTIVE:  The patient is comfortable in bed.  He remains confused from a

urologic perspective whether biopsies going to be done.



OBJECTIVE:

VITAL SIGNS:  Blood pressure is 115/88, heart rate is atrial fibrillation

in the 80s.

NECK:  Negative JVD.

LUNGS:  Without rales.

HEART:  With S1, S2.

EXTREMITIES:  Without edema.



LABORATORY DATA:  Hemoglobin is 13.  Chemistry, potassium is 5.8.



IMPRESSION:

1.  Chronic atrial fibrillation.

2.  Urinary obstruction.

3.  Likely prostate cancer.

4.  Heart rate is well-controlled.



PLAN:  Given these findings, biopsies plans for an outpatient.  We start

the patient on Eliquis today and discontinue the subcu Lovenox.





__________________________________________

Adam Farah MD





DD:  02/19/2018 10:56:01

DT:  02/19/2018 11:00:04

Job # [id number]

## 2018-02-19 NOTE — PN
DATE:  02/16/2018



See many previously dictated notes.



SUBJECTIVE:  This is a very pleasant gentleman who presents with elevated

PSA, looks like a bone lesion.



He also has urinary retention, voiding dysfunction and gross hematuria and

Urology is following for all the above, that is for elevated PSA, urinary

retention, and gross hematuria.



Our plans were as, see previous notes, to wait for the results of the bone

biopsy because this is prostate cancer, no need for an ultrasound and

prostate biopsy.



For the voiding dysfunction and hematuria, we did a cystoscopy, see the

dictated note.



There is no pathology report yet back in the computer, Dr. Adam Shine did

a biopsy the other day.



I have been calling Pathology, in fact there was a verbal report that it

was possibly negative, but, actually I called again today and they sent out

for a second opinion from a special team.



It looks like in fact, the biopsy result will be malignancy, see the plans

listed below.



I am waiting to give more results to the patient, but not giving them as of

yet.



See all plans listed below



In the meantime, the patient is doing well.  He is stable.  Fraire catheter

is in place, draining clear yellow urine.



Also in medical standpoint, the patient has also been started just today on

one dose of Eliquis.



PAST MEDICAL HISTORY:  Otherwise, unchanged.



PAST SURGICAL HISTORY:  Otherwise, unchanged.



REVIEW OF SYSTEMS:  _____.



PHYSICAL EXAMINATION

GENERAL:  Well-developed, well-nourished male, in no apparent distress.

VITAL SIGNS:  Within normal limits. included in the chart.

LUNGS:  Clear.

HEART:  S1, S2.

ABDOMEN:  Soft.  Fraire catheter in place.



DIAGNOSES:  Urinary retention, voiding dysfunction, gross hematuria,

elevated prostate surface antigen, and possible bone metastasis from

prostate cancer.



From the Urology standpoint, _____ I discussed with the patient the idea of

using Flomax and trial to void.  He does not want the Fraire catheter going

in and out, so for now, we are going to leave that Fraire catheter.  We will

have to decide medically what to do with the presence of the Eliquis, but

we are not going to perform a biopsy today,  We are going to wait to see

the final report.



I spoke directly to the pathologist today who thinks that it should be back

shortly, but at this point, it is 02/16/2018, that is Friday, so for today

we are not going to remove the Fraire, we are going to wait to decide what

to do _____



We are also not planning for prostate biopsy until we get the results and

if the results show prostate cancer, we will not do a biopsy; for now, we

will do biopsy of the prostate.



The plan for the today are as follows;



1.  Maintain Fraire.

2.  Plus or minus Flomax if medically okay to accept medication.



Subsequently, we will plan for further evaluation.



The patient may require transurethral resection of the prostate or

GreenLight laser of the prostate depending on whether he can tolerate such

medically



Further plans to follow.  We will discuss with Dr. Márquez.





__________________________________________

Kevin Gray MD



DD:  02/19/2018 6:31:43

DT:  02/19/2018 11:25:23

Job # [id number]

## 2018-02-19 NOTE — RAD
HISTORY:

dimished lung sounds  



COMPARISON:

02/08/2018 



FINDINGS:



LUNGS:

There is a minimal patchy infiltrate at the right lung base



PLEURA:

No significant pleural effusion identified, no pneumothorax apparent.



CARDIOVASCULAR:

Moderate cardiomegaly



OSSEOUS STRUCTURES:

No significant abnormalities.



VISUALIZED UPPER ABDOMEN:

Normal.



OTHER FINDINGS:

None.



IMPRESSION:

Minimal patchy infiltrate at the right lung base

## 2018-02-20 VITALS — SYSTOLIC BLOOD PRESSURE: 145 MMHG | HEART RATE: 81 BPM | DIASTOLIC BLOOD PRESSURE: 96 MMHG

## 2018-02-20 VITALS — TEMPERATURE: 97.5 F

## 2018-02-20 VITALS — OXYGEN SATURATION: 96 %

## 2018-02-20 VITALS — HEART RATE: 66 BPM

## 2018-02-20 LAB
ALBUMIN SERPL-MCNC: 3.4 G/DL (ref 3–4.8)
ALBUMIN/GLOB SERPL: 1 {RATIO} (ref 1.1–1.8)
ALT SERPL-CCNC: 38 U/L (ref 7–56)
AST SERPL-CCNC: 53 U/L (ref 17–59)
BUN SERPL-MCNC: 13 MG/DL (ref 7–21)
BUN SERPL-MCNC: 15 MG/DL (ref 7–21)
CALCIUM SERPL-MCNC: 9.5 MG/DL (ref 8.4–10.5)
CALCIUM SERPL-MCNC: 9.7 MG/DL (ref 8.4–10.5)
GFR NON-AFRICAN AMERICAN: 49
GFR NON-AFRICAN AMERICAN: 53
MAGNESIUM SERPL-MCNC: 2.1 MG/DL (ref 1.7–2.2)
URATE SERPL-MCNC: 5.2 MG/DL (ref 3.5–8.5)

## 2018-02-20 RX ADMIN — Medication SCH MG: at 09:23

## 2018-02-20 RX ADMIN — PANTOPRAZOLE SODIUM SCH MG: 20 TABLET, DELAYED RELEASE ORAL at 06:40

## 2018-02-20 RX ADMIN — DIGOXIN SCH MG: 0.12 TABLET ORAL at 14:13

## 2018-02-20 RX ADMIN — POLYETHYLENE GLYCOL 3350 SCH GM: 17 POWDER, FOR SOLUTION ORAL at 09:26

## 2018-02-20 RX ADMIN — DILTIAZEM HYDROCHLORIDE SCH MG: 240 CAPSULE, COATED, EXTENDED RELEASE ORAL at 09:24

## 2018-02-20 NOTE — CP.PCM.PN
Subjective





- Date & Time of Evaluation


Date of Evaluation: 02/20/18


Time of Evaluation: 09:53





- Subjective


Subjective: 


Follow up Nephrology Consultation Note





Assessment: Stable


Hypervolemic hyponatremia likely due to CHF and ? contribution by metastatic 

cancer leading to excess ADH (SIADH)


Hypertensive Chronic Kidney Disease (I12.9)





Chronic Kidney Disease (N18.3) Stage 3 with Cr 1.2 with superimposed mild SUSSY


metastatic cancer, HTN (I12.9)


CHF LVEF 20%


Hypokalemia hypomagnesemia, hx of BPH





Plan


No acute need for renal replacement therapy at this time. 


Hypertension control with meds as ordered. started low dose losartan but d/c 

due to hyperkalemia.


Monitor Input/Output, daily weights and renal function with basic metabolic 

panel


supplement electrolytes as needed


agree with diuretics


Avoid nephrotoxins/NSAIDs


Glycemic control


Further work up for as per primary team


medical management of hyperkalemia. expect K to improve as diuretics resumed. 

dig level 0.7 noted.


pt stable for d/c from renal perspective when planned. can stop tolvaptan. off 

since sunday





Thanks for allowing me to participate in care of your patient. Will follow 

patient with you. Please call if any Qs. d/w team


Dr Rickey Tapia


Office: [phone] Cell: [phone] Fax: [phone]





Subjective: Noted events overnight. Patients feels okay. 


Denies chest pain, palpitation,improved shortness of breath, denies leg 

swelling. 


All other negative. had bone biopsy by IR on 2/13/18





Physical Examination:      


General Appearance: Comfortable, in no acute respiratory distress, co-operative 

. 


Vitals reviewed and noted as below


Head; Atraumatic, normocephalic


ENT: no ulcers no thrush. Tongue is midline. Oropharynx: no rash or ulcers.


EYES: Pupils are equal, round and reactive to light accommodation. Eye muscles 

and extraocular movement intact. Sclera is anicteric.


Neck; supple no lymphadenopathy, no thyromegaly or bruit


Lungs: Normal respiratory rate/effort. Breath sounds reduced at rt base


Heart: Normal rate. s1s2 normal. No rub or gallop. 


Extremities: no edema. No varicose veins


Neurological: Patient is alert, awake and oriented to person, place and time. 

No focal deficit. Strength bilateral appropriate and equal


Skin: Warm and dry. Normal turgor. No rash. Palpitation: Normal elasticity for 

age


Abdomen: Abdomen is soft. Bowel sounds +. There is no abdominal tenderness, no 

guarding/rigidity no organomegaly


Psych: normal insight and normal affect/mood


MSK: no joint tenderness or swelling. Digits and nails normal, no deformity


: kidney or bladder not palpable





Labs/imaging reviewed.


Past medical history, past surgical history, family history, social history, 

allergy reviewed and noted as below


Family hx: no hx of CKD. Rest non-contributory 





urine Na 75 urine osmol 190 Serum osmol 281 uric acid 7.9 TSH 2.3 LVEF 20%





Objective





- Vital Signs/Intake and Output


Vital Signs (last 24 hours): 


 











Temp Pulse Resp BP Pulse Ox


 


 98 F   81   18   145/96 H  100 


 


 02/20/18 00:01  02/20/18 09:25  02/20/18 00:01  02/20/18 09:26  02/20/18 00:01








Intake and Output: 


 











 02/20/18 02/20/18





 06:59 18:59


 


Intake Total 120 


 


Output Total 1000 


 


Balance -880 














- Medications


Medications: 


 Current Medications





Allopurinol (Zyloprim)  300 mg PO DAILY [facility]


   Last Admin: 02/20/18 09:26 Dose:  300 mg


Apixaban (Eliquis)  2.5 mg PO BID [facility]


   PRN Reason: Protocol


   Last Admin: 02/20/18 09:25 Dose:  2.5 mg


Aspirin (Ecotrin)  81 mg PO DAILY [facility]


   Last Admin: 02/20/18 09:26 Dose:  81 mg


Atorvastatin Calcium (Lipitor)  40 mg PO DIN [facility]


   Last Admin: 02/19/18 19:50 Dose:  40 mg


Digoxin (Digoxin)  0.125 mg PO 1400 [facility]


   Last Admin: 02/19/18 14:00 Dose:  0.125 mg


Diltiazem HCl (Cardizem Cd)  240 mg PO DAILY [facility]


   Last Admin: 02/20/18 09:24 Dose:  240 mg


Docusate Sodium (Colace)  100 mg PO TID [facility]


   Last Admin: 02/20/18 09:25 Dose:  100 mg


Ergocalciferol (Drisdol 50,000 Intl Units Cap)  1 cap PO Q7D [facility]


   Last Admin: 02/17/18 21:46 Dose:  1 cap


Furosemide (Lasix)  40 mg IVP DAILY [facility]


   Last Admin: 02/20/18 09:26 Dose:  40 mg


Magnesium Oxide (Mag-Ox)  800 mg PO BID [facility]


   Last Admin: 02/20/18 09:23 Dose:  800 mg


Metoprolol Tartrate (Lopressor)  50 mg PO BID [facility]


   Last Admin: 02/20/18 09:25 Dose:  50 mg


Ondansetron HCl (Zofran Inj)  4 mg IVP Q4H PRN


   PRN Reason: Nausea/Vomiting


Pantoprazole Sodium (Protonix Ec Tab)  20 mg PO 0600,1600 [facility]


   Last Admin: 02/20/18 06:40 Dose:  20 mg


Polyethylene Glycol (Miralax)  17 gm PO BID [facility]


   Last Admin: 02/20/18 09:26 Dose:  17 gm


Tamsulosin HCl (Flomax)  0.4 mg PO DAILY [facility]


   Last Admin: 02/20/18 09:26 Dose:  0.4 mg











- Labs


Labs: 


 





 02/19/18 07:00 





 02/20/18 05:15 





 











PT  16.7 SECONDS (9.4-12.5)  H  02/19/18  07:00    


 


INR  1.44  (0.93-1.08)  H  02/19/18  07:00    


 


APTT  43.5 Seconds (25.1-36.5)  H  02/19/18  07:00

## 2018-02-20 NOTE — DS
SUBJECTIVE:  The patient was seen in room 235, bed #2.  The patient is

lying in the bed.  The patient is comfortable.  Overnight nurse's notes

were reviewed.  The patient is seen by .  The patient's

insurance does not accept TCU and the only subacute rehab, which is in the

network with the patient's insurance is [facility].  The

patient is seen lying in the bed.



OBJECTIVE

VITAL SIGNS:  T-max 97.9; heart rate 74, 84, 82, 62; blood pressure 115/58,

136/76; respirations 18; O2 sat 100%.  Output 850 yesterday and 1500.

HEAD:  Normocephalic, atraumatic.

HEENT:  Shows pinkish conjunctivae.  Anicteric sclerae.

NECK:  No jugular venous distention.

CHEST:  Kyphosis.

LUNGS:  Shows decreased breath sound at the bases, left more than the

right.

CARDIOVASCULAR:  Shows S1 and S2, regular rhythm.

ABDOMEN:  Slightly protuberant.  Positive bowel sound. No

hepatosplenomegaly noted.  No guarding.  No rigidity.  No rebound

tenderness.

GENITALIA:  Male.

RECTAL:  Deferred.

EXTREMITIES:  Shows positive SCDs.  No SILVIANO stockings.  Positive swelling of

the ankles noted and trace swelling of the legs noted.

MUSCULOSKELETAL:  Shows a body mass index of 28.5.

NEUROLOGIC:  The patient is alert, awake, oriented x3.  Cranial nerves

II-XII intact.  Gait examination is not tested.  VASCULAR:  Palpable

pulses.



DIAGNOSTICS:  WBC 4.4, hemoglobin and hematocrit 13.3 and 40.4, platelets

158,000.  Sodium 132, potassium 5.8, chloride 95, CO2 25, anion gap18, BUN

16, creatinine 1.5, GFR 55, glucose 90, calcium 9.8, magnesium 2.2. 

Alkaline phosphatase 127.  LFTs are normal.  L2 vertebral body shows

metastatic adenocarcinoma of the prostatic origin.  Positive confirming. 

PSA and PSAP immunostains, positive metastatic adenocarcinoma of the

prostatic origin.



IMPRESSION AND PLAN

1.  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

post lumbar 2 vertebral biopsy.

2.  Decompensated nonischemic cardiomyopathy with decompensated systolic

congestive heart failure.

3.  Atrial fibrillation with rapid ventricular response.

4.  Non-hemolyzed hyperkalemia.

5.  Slowly resolving hyponatremia.

6.  Anemia and leukopenia.

7.  Transient lactic acidosis.

8.  Hypovitaminosis D.

9.  Elevated prostate surface antigen of greater than 85, 90 and 97.

10.  Trace proteinuria.

11.  Constipation.

12.  Hypomagnesemia.

13.  Hyperuricemia.

1.  New-onset atrial fibrillation with rapid ventricular response.

2.  Acute systolic congestive heart failure.

3.  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

extremely elevated prostate-specific antigen.

4.  Small pleural effusion.

5.  Hypertension.

6.  Acute systolic congestive heart failure with elevated BNP and bilateral

lower extremity venous stasis.

7.  Leukopenia, anemia.

8.  Transient lactic acidosis.

9.  Hyponatremia, slow resolving.

10.  Non-hemolyzed hyperkalemia and hypokalemia.

11.  Hypochloremic hyponatremia.

12.  Hyperbilirubinemia.

13.  Transaminitis.

14.  Extremely elevated prostate-specific antigen of greater than 85 and

greater than 90.

15.  Gait dysfunction.

16.  Deconditioning.

17.  Transient acute kidney injury.

18.  Possible ileus pattern.

19.  Distended numerous large bowel loops with potential ileus pattern.

20.  Diffuse osteopenia and osteoporosis.

21.  Status post CAT scan guided L2 vertebral biopsy.

22.  Atrial fibrillation with rapid ventricular response.

23.  Constipation.

24.  Dyslipidemia.

25.  Hypomagnesemia.

26.  Hyperuricemia.

1.  Atrial fibrillation with rapid ventricular response.

2.  Acute systolic congestive heart failure with elevated BNP and bilateral

lower extremity lymphedema (resolved).

3.  Dilated cardiomyopathy with left ventricular ejection fraction of 20%

and pulmonary arterial hypertension with elevated right ventricular

systolic pressure of 52 mmHg.

4.  Moderate to severely impaired left ventricular systolic function and

global left ventricular hypokinesis.

5.  Concentric left ventricular hypertrophy.

6.  Moderately reduced right ventricular systolic function.

7.  Moderately dilated left atrium and moderately dilated right atrium.

8.  Moderately thickened aortic valve.

9.  Moderately thickened mitral valve.

10.  Moderate mitral regurgitation.

11.  Moderate tricuspid regurgitation and moderate pulmonary arterial

hypertension.

12.  Deconditioning.

13.  Gait dysfunction.

14.  Leukopenia, anemia.

15.  Slow resolving refractory hyponatremia.

16.  Lactic acidosis.

17.  Status post nonhemolyzed hyperkalemia.

18.  Status post acute kidney injury with chronic kidney disease stage 3.

19.  Transaminitis.

1.  Possible developing versus impending ileus involving large bowel with

distending large bowel loops and with large bowel loop gas.

2.  Moderate amount of gastric viscus gas.

3.  Diffuse osteopenia, osteoporosis.

4.  Atrial fibrillation with rapid ventricular response.

5.  Hyper and hypotension.

6.  Leukopenia, anemia.

7.  Persistent refractory hyponatremia, non-hemolyzed hyperkalemia.

8.  Transaminitis.

9.  Elevated prostate-specific antigen.

1.  Status post cystoscopy, insertion of 18-[place] two-way Fraire catheter. 

Status post cystoscopy, evacuation of clots and fulguration and retrograde

pyelogram.

2.  Atrial fibrillation with rapid ventricular response, resolving.

3.  Abdominal distention, etiology undetermined versus constipation.

4.  Leukopenia.

5.  Normocytic anemia.

6.  Mild coagulopathy.

7.  Transient lactic acidosis.

8.  Persistent refractory hyponatremia.

9.  Non-hemolyzed hyperkalemia.

10.  Hypochloremic hyponatremia.

11.  Hyperbilirubinemia.

12.  Transaminitis.

13.  Elevated prostate-specific antigen.

14.  Gait dysfunction.

15.  Deconditioning.

16.  Proteinuria, hematuria, bacteriuria.

17.  Status post CT-guided L2 vertebral body biopsy.

18.  Hypervolemic hyponatremia, probably secondary to congestive heart

failure versus secondary to metastatic carcinoma.

19.  Syndrome of inappropriate antidiuretic hormone.

20.  Hypertensive kidney disease.

21.  Acute kidney injury with underlying chronic kidney disease.

22.  Systolic congestive heart failure and cardiomyopathy.

23.  Prostatic hypertrophy.

24.  New-onset atrial fibrillation with rapid ventricular response.

25.  Suspected metastases prostate carcinoma with extremely elevated PSA

and bone metastasis.

1.  Atrial fibrillation with rapid ventricular response.

2.  Transient hypotension (resolved).

3.  Leukopenia, anemia.

4.  Transient lactic acidosis.

5.  Refractory hyponatremia.

6.  Hyperkalemia.

7.  Hypomagnesemia.

8.  Hyperbilirubinemia.

9.  Most likely suspected metastatic prostate carcinoma with bone

metastasis with elevated PSA.

10.  Trace proteinuria, microscopic hematuria.

11.  Status post CT-guided lumbar spine biopsy.

12.  Atrial fibrillation with rapid ventricular response.

13.  Voiding dysfunction.

14.  Gait dysfunction.

15.  Systolic congestive heart failure (resolving).

16.  Constipation.

17.  Dyslipidemia.

18.  Hypomagnesemia.

19.  Hyperuricemia.

20.  Deconditioning.

1.  Atrial fibrillation with rapid ventricular response.

2.  Suspect metastatic prostate carcinoma with bone metastasis.

3.  Pleural effusion.

4.  Leukopenia.

5.  Anemia.

6.  Refractory hyponatremia.

7.  Hypervolemic hyponatremia secondary to congestive heart failure.

8.  Possible syndrome of inappropriate antidiuretic hormone.

9.  Hypertensive chronic kidney disease.

10.  Systolic congestive heart failure with cardiomyopathy.

11.  Hypokalemia.

12.  Hypomagnesemia.

13.  Normocytic anemia.

14.  Granulocytosis.

15.  Transient lactic acidosis.

16.  Hypokalemia.

17.  Hyponatremia.

18.  Hypomagnesemia.

19.  Hyperbilirubinemia.

20.  Transient hypotension.

21.  Constipation.

22.  Hypercholesteremia.

23.  Hypomagnesemia.

24.  Hyperuricemia.

1.  New-onset atrial fibrillation with rapid ventricular response.

2.  Dilated cardiomyopathy.

3.  Acute systolic congestive heart failure with elevated brain natriuretic

peptide.

4.  Hyponatremia.

5.  Questionable syndrome of inappropriate antidiuretic hormone secretion.

6.  Most probable metastatic prostate carcinoma with multiple bone

metastases.

7.  Leukopenia and anemia.

8.  Transient lactic acidosis.

9.  Hypokalemia.

10.  Hyperbilirubinemia.

11.  Elevated prostate-specific antigen of greater than 90.

12.  Proteinuria, microscopic hematuria.

13.  Moderate pulmonary arterial hypertension with moderate tricuspid

regurgitation and elevated right ventricular systolic pressure of 52 mmHg.

14.  Bilateral lower extremity lymphedema and venous stasis, resolving.

15.  Lateral coronary ischemic changes.

16.  Constipation.

17.  Hypercholesteremia.

18.  Hypomagnesemia.

19.  Hyperuricemia.

1.  Atrial fibrillation with rapid ventricular response.

2.  Dilated cardiomyopathy with ejection fraction of 21%.

3.  Concentric left ventricular hypertrophy.

4.  Moderate to severely impaired left ventricular function with left

ventricle ejection fraction of 21%.

5.  Global left ventricular hypokinesis.

6.  Moderately reduced right ventricular systolic function.

7.  Moderately dilated left and right atrium.

8.  Moderately thickened aortic valve.

9.  Moderately thickened mitral valve with moderate mitral regurgitation.

10.  Moderate tricuspid regurgitation with moderate pulmonary arterial

hypertension with right ventricular systolic pressure of 52 mmHg.

11.  Atrial fibrillation with rapid ventricular response.

12.  Gait dysfunction.

13.  Bilateral lower extremity lymphedema and venous stasis.

14.  Leukopenia.

15.  Anemia.

16.  Lactic acidosis.

17.  Hypokalemia.

18.  Hyperbilirubinemia.

19.  Elevated prostate-specific antigen.

20.  Hyperbilirubinemia.

21.  Hyponatremia.

22.  Possible syndrome of inappropriate antidiuretic hormone.

23.  Systolic congestive heart failure with elevated BNP.

24.  Systolic and diastolic congestive heart failure with elevated BNP,

decreased left ventricular ejection fraction of 21% and moderate pulmonary

arterial hypertension, moderate tricuspid regurgitation.

1.  Hypertension.

2.  Atrial fibrillation with rapid ventricular response.

3.  Acute systolic and diastolic congestive heart failure with decreased

left ventricular ejection fraction of 20% and abnormal weight gain and

bilateral lower extremity lymphedema.

4. Leukopenia.

5.  Transient lactic acidosis.

6.  Hyponatremia, possibly syndrome of inappropriate antidiuretic hormone

secretion.

7.  Hypomagnesemia.

8.  Hyperbilirubinemia.

9.  Elevated prostate-specific antigen of greater than 90.

10.  Gait dysfunction.

11.  Deconditioning.

12.  Proteinuria, hematuria, bacteriuria.

13.  Axial skeleton diffuse osseous metastatic disease with abnormal

increased uptake of the thoracolumbar spine, right iliac bone, posterior

rib in the right rib, bilateral scapula, proximal femur bilaterally

including the lesser trochanter on the right and subtrochanteric region on

the left with increased uptake in the sternum.

14.  Cerebral cortical atrophy of the brain with moderate volume loss.

15.  Centrilobular and paraseptal emphysema with atelectasis scarring.

16.  Subcentimeter pulmonary nodules of 0.3 cm.

17.  Bilateral pleural effusion.

18.  Cardiomegaly.

19.  Scattered sclerotic bony lesion.

20.  Moderate diffuse stranding of the subcutaneous tissue and anasarca.

21.  Bilateral adrenal gland hypertrophy.

22.  Probable right renal cyst.

23.  Colonic diverticulosis.

24.  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

25.  Degenerative joint disease of the spine.

26.  Small fat-containing umbilical hernia.

27.  Small fat-containing inguinal hernia.

28.  Subcentimeter retroperitoneal lymphadenopathy.

29.  Dilated cardiomyopathy with left ventricular ejection fraction of 21%.

30.  Concentric left ventricular hypertrophy.

31.  Moderate-to-severely impaired left ventricular systolic function with

left ventricular ejection fraction of 21% and left ventricular global

hypokinesis.

32.  Moderately reduced right ventricular systolic function.

33.  Moderately dilated left and right atrium.

34.  Moderately thickened aortic valve.

35.  Moderately thickened mitral valve.

36.  Moderate mitral regurgitation.

37.  Moderate tricuspid regurgitation with moderate pulmonary arterial

hypertension with right ventricular systolic pressure of 52 mmHg.

38.  History of poor compliance and noncompliance.

39.  Hypervolemic hyponatremia secondary to systolic congestive heart

failure.

40.  Increased urinary retention.

41.  Possible and probable stage IV prostate carcinoma with bone metastasis

and extremely high PSA of greater than 90.

1.  Atrial fibrillation with rapid ventricular response.

2.  Noel-inferolateral coronary ischemia.

3.  History of hypertension.

4.  Most possible and most likely systolic congestive heart failure with

elevated BNP.

5.  Cardiomegaly and pulmonary vascular congestion, suggestive of

congestive heart failure.

6.  Leukopenia.

7.  Lactic acidosis.

8.  Hyponatremia, etiology undetermined.

9.  Questionable syndrome of inappropriate (excretion) of antidiuretic

hormone.

10.  Indeterminate troponin.

11.  Elevated BNP suggestive of congestive heart failure.

12.  Trace proteinuria, trace microscopic hematuria, trace bacteriuria.

13.  Bilateral lower extremity lymphedema.

14.  Anasarca.

15.  Extremely poor compliance and noncompliance.

16.  History of hypovitaminosis D.

17.  History of hyperuricemia, hypomagnesemia and history of elevated

prostate-specific antigen.



PLAN:  At this time, the patient will be considered for discharge home or

subacute rehab as approved by the patient's insurance if the patient is not

accepted to [facility].  The patient's discharge medicines

as per ambulatory orders plus new scripts.  The patient will be given all

new scripts upon discharge.  Followup with Dr. Márquez within 1 week with all

medications.  The patient is also advised to follow up with Dr. Gray and

Dr. Ambrocio within one week.



DISCHARGE MEDICATIONS

1.  Eliquis 2.5 mg twice a day.

2.  Ecotrin 81 mg daily.

3.  Lipitor 40 mg daily.

4.  Vitamin D3 2000 international units daily.

5.  Digoxin 0.125 mg daily.

6.  Cardizem  mg daily.

7.  Colace 100 mg three times a day.

8.  Drisdol 50,000 units every 7 days x 12.

9.  Uloric 40 mg daily.

10.  Allopurinol 300 mg daily.

11.  Folic acid 1 mg daily.

12.  Lasix 40 mg once or twice daily.

13.  Magnesium oxide 800 mg twice a day.

14.  Lopressor 50 mg twice a day.

15.  Protonix 40 mg daily.

16.  MiraLax 17 g twice a day.

17.  Tolvaptan 30 mg daily.



The patient's Cozaar is stopped.  The patient is discharged home with above

followups.



During this hospitalization, the patient and the patient's son, Claude,

have been extensively explained about the patient's diagnosis, test

results, the need for the patient's compliance with close followup, and

need for strict compliance with medication, diet, physician followup.  All

of the above was explained to the patient and the patient's son in layman's

language.  All questions concerned answered, which they acknowledged to

understand.



Time spent in the entire discharge process more than 45 minutes.



Dictated and electronically signed, not read.











__________________________________________

Dale Márquez MD



DD:  02/19/2018 11:23:12

DT:  02/19/2018 11:38:31

Job # [id number]









MTDD

## 2018-02-21 NOTE — DS
SUBJECTIVE:  The patient is seen lying in the bed in room 235, bed #2.



PHYSICAL EXAMINATION:

VITAL SIGNS:  T-max 98, heart rate 81, 82, 84, blood pressure initially in

the last 24 hours 136/76, 128/_____, 145/96, respirations 18, O2 sat 96%. 

Intake/output; output only shows 1500 mL day before yesterday and 1000 mL

today.

HEAD:  Normocephalic, atraumatic.

HEENT:  Shows pinkish conjunctivae.  Anicteric sclerae.  No oropharyngeal

lesion.  No neck rigidity.  No jugular venous distention.

CHEST:  Kyphosis.

LUNGS:  Shows decreased breath sound at the left base.  No crackles, rales,

or wheezing noted.

CARDIOVASCULAR:  Shows S1, S2, irregular rhythm.  Positive systolic murmur

at left sternal border, left second intercostal space, right second

intercostal space.

ABDOMEN:  Less protuberant.

GENITALIA:  Male.

RECTAL:  Deferred.

EXTREMITIES:  Shows positive SCDs.  Trace swelling of the ankles and feet

noted.

MUSCULOSKELETAL:  Shows a body mass index of 29.

NEUROLOGIC:  The patient is alert, awake, responsive.  He is able to move

upper and lower extremities without assistance.

GAIT:  Not tested.

VASCULAR:  Palpable pulses.



DIAGNOSTICS:  On February 20th; sodium 134, potassium 5.7, non-hemolyzed,

chloride 97, CO2 of 29, anion gap 14, BUN 15, creatinine 1.4, GFR 59,

glucose 94, uric acid 5.2, magnesium 2.1.  LFTs are normal.



FINAL IMPRESSION AND DISCHARGE DIAGNOSES:

1.  Metastatic adenocarcinoma of the prostate.

2.  Hypertension.

3.  Atrial fibrillation with rapid ventricular response.

4.  Leukopenia.

5.  Anemia.

6.  Mild coagulopathy.

7.  Transient lactic acidosis.

8.  Non-hemolyzed hyperkalemia, probably angiotensin receptive blocker

induced.

9.  Hypovitaminosis D.

10.  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

pulmonary arterial hypertension and right ventricular systolic pressure of

52 mmHg.

11.  Mild concentric left ventricular hypertrophy.

12.  Moderate to severely impaired left ventricular systolic function and

left ventricular global hypokinesis.

13.  Moderately dilated left and right atriums.

14.  Moderately reduced right ventricular systolic function.

15.  Moderately thickened aortic valve.

16.  Moderately thickened mitral valve.

17.  Moderate mitral regurgitation.

18.  Moderate tricuspid regurgitation.

19.  Moderate pulmonary arterial hypertension.

20.  Hypervolemic hyponatremia, secondary to congestive heart failure and

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

inappropriate antidiuretic hormone.

21.  Hypertensive chronic kidney disease stage III.

22.  Hypokalemia, hypomagnesemia.

23.  Constipation.

24.  Hyperuricemia.

1.  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

post lumbar 2 vertebral biopsy.

2.  Decompensated nonischemic cardiomyopathy with decompensated systolic

congestive heart failure.

3.  Atrial fibrillation with rapid ventricular response.

4.  Non-hemolyzed hyperkalemia.

5.  Slowly resolving hyponatremia.

6.  Anemia and leukopenia.

7.  Transient lactic acidosis.

8.  Hypovitaminosis D.

9.  Elevated prostate surface antigen of greater than 85, 90 and 97.

10.  Trace proteinuria.

11.  Constipation.

12.  Hypomagnesemia.

13.  Hyperuricemia.

1.  New-onset atrial fibrillation with rapid ventricular response.

2.  Acute systolic congestive heart failure.

3.  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

extremely elevated prostate-specific antigen.

4.  Small pleural effusion.

5.  Hypertension.

6.  Acute systolic congestive heart failure with elevated BNP and bilateral

lower extremity venous stasis.

7.  Leukopenia, anemia.

8.  Transient lactic acidosis.

9.  Hyponatremia, slow resolving.

10.  Non-hemolyzed hyperkalemia and hypokalemia.

11.  Hypochloremic hyponatremia.

12.  Hyperbilirubinemia.

13.  Transaminitis.

14.  Extremely elevated prostate-specific antigen of greater than 85 and

greater than 90.

15.  Gait dysfunction.

16.  Deconditioning.

17.  Transient acute kidney injury.

18.  Possible ileus pattern.

19.  Distended numerous large bowel loops with potential ileus pattern.

20.  Diffuse osteopenia and osteoporosis.

21.  Status post CAT scan guided L2 vertebral biopsy.

22.  Atrial fibrillation with rapid ventricular response.

23.  Constipation.

24.  Dyslipidemia.

25.  Hypomagnesemia.

26.  Hyperuricemia.

1.  Atrial fibrillation with rapid ventricular response.

2.  Acute systolic congestive heart failure with elevated BNP and bilateral

lower extremity lymphedema (resolved).

3.  Dilated cardiomyopathy with left ventricular ejection fraction of 20%

and pulmonary arterial hypertension with elevated right ventricular

systolic pressure of 52 mmHg.

4.  Moderate to severely impaired left ventricular systolic function and

global left ventricular hypokinesis.

5.  Concentric left ventricular hypertrophy.

6.  Moderately reduced right ventricular systolic function.

7.  Moderately dilated left atrium and moderately dilated right atrium.

8.  Moderately thickened aortic valve.

9.  Moderately thickened mitral valve.

10.  Moderate mitral regurgitation.

11.  Moderate tricuspid regurgitation and moderate pulmonary arterial

hypertension.

12.  Deconditioning.

13.  Gait dysfunction.

14.  Leukopenia, anemia.

15.  Slow resolving refractory hyponatremia.

16.  Lactic acidosis.

17.  Status post nonhemolyzed hyperkalemia.

18.  Status post acute kidney injury with chronic kidney disease stage 3.

19.  Transaminitis.

1.  Possible developing versus impending ileus involving large bowel with

distending large bowel loops and with large bowel loop gas.

2.  Moderate amount of gastric viscus gas.

3.  Diffuse osteopenia, osteoporosis.

4.  Atrial fibrillation with rapid ventricular response.

5.  Hyper and hypotension.

6.  Leukopenia, anemia.

7.  Persistent refractory hyponatremia, non-hemolyzed hyperkalemia.

8.  Transaminitis.

9.  Elevated prostate-specific antigen.

1.  Status post cystoscopy, insertion of 18-Fijian two-way Fraire catheter. 

Status post cystoscopy, evacuation of clots and fulguration and retrograde

pyelogram.

2.  Atrial fibrillation with rapid ventricular response, resolving.

3.  Abdominal distention, etiology undetermined versus constipation.

4.  Leukopenia.

5.  Normocytic anemia.

6.  Mild coagulopathy.

7.  Transient lactic acidosis.

8.  Persistent refractory hyponatremia.

9.  Non-hemolyzed hyperkalemia.

10.  Hypochloremic hyponatremia.

11.  Hyperbilirubinemia.

12.  Transaminitis.

13.  Elevated prostate-specific antigen.

14.  Gait dysfunction.

15.  Deconditioning.

16.  Proteinuria, hematuria, bacteriuria.

17.  Status post CT-guided L2 vertebral body biopsy.

18.  Hypervolemic hyponatremia, probably secondary to congestive heart

failure versus secondary to metastatic carcinoma.

19.  Syndrome of inappropriate antidiuretic hormone.

20.  Hypertensive kidney disease.

21.  Acute kidney injury with underlying chronic kidney disease.

22.  Systolic congestive heart failure and cardiomyopathy.

23.  Prostatic hypertrophy.

24.  New-onset atrial fibrillation with rapid ventricular response.

25.  Suspected metastases prostate carcinoma with extremely elevated PSA

and bone metastasis.

1.  Atrial fibrillation with rapid ventricular response.

2.  Transient hypotension (resolved).

3.  Leukopenia, anemia.

4.  Transient lactic acidosis.

5.  Refractory hyponatremia.

6.  Hyperkalemia.

7.  Hypomagnesemia.

8.  Hyperbilirubinemia.

9.  Most likely suspected metastatic prostate carcinoma with bone

metastasis with elevated PSA.

10.  Trace proteinuria, microscopic hematuria.

11.  Status post CT-guided lumbar spine biopsy.

12.  Atrial fibrillation with rapid ventricular response.

13.  Voiding dysfunction.

14.  Gait dysfunction.

15.  Systolic congestive heart failure (resolving).

16.  Constipation.

17.  Dyslipidemia.

18.  Hypomagnesemia.

19.  Hyperuricemia.

20.  Deconditioning.

1.  Atrial fibrillation with rapid ventricular response.

2.  Suspect metastatic prostate carcinoma with bone metastasis.

3.  Pleural effusion.

4.  Leukopenia.

5.  Anemia.

6.  Refractory hyponatremia.

7.  Hypervolemic hyponatremia secondary to congestive heart failure.

8.  Possible syndrome of inappropriate antidiuretic hormone.

9.  Hypertensive chronic kidney disease.

10.  Systolic congestive heart failure with cardiomyopathy.

11.  Hypokalemia.

12.  Hypomagnesemia.

13.  Normocytic anemia.

14.  Granulocytosis.

15.  Transient lactic acidosis.

16.  Hypokalemia.

17.  Hyponatremia.

18.  Hypomagnesemia.

19.  Hyperbilirubinemia.

20.  Transient hypotension.

21.  Constipation.

22.  Hypercholesteremia.

23.  Hypomagnesemia.

24.  Hyperuricemia.

1.  New-onset atrial fibrillation with rapid ventricular response.

2.  Dilated cardiomyopathy.

3.  Acute systolic congestive heart failure with elevated brain natriuretic

peptide.

4.  Hyponatremia.

5.  Questionable syndrome of inappropriate antidiuretic hormone secretion.

6.  Most probable metastatic prostate carcinoma with multiple bone

metastases.

7.  Leukopenia and anemia.

8.  Transient lactic acidosis.

9.  Hypokalemia.

10.  Hyperbilirubinemia.

11.  Elevated prostate-specific antigen of greater than 90.

12.  Proteinuria, microscopic hematuria.

13.  Moderate pulmonary arterial hypertension with moderate tricuspid

regurgitation and elevated right ventricular systolic pressure of 52 mmHg.

14.  Bilateral lower extremity lymphedema and venous stasis, resolving.

15.  Lateral coronary ischemic changes.

16.  Constipation.

17.  Hypercholesteremia.

18.  Hypomagnesemia.

19.  Hyperuricemia.

1.  Atrial fibrillation with rapid ventricular response.

2.  Dilated cardiomyopathy with ejection fraction of 21%.

3.  Concentric left ventricular hypertrophy.

4.  Moderate to severely impaired left ventricular function with left

ventricle ejection fraction of 21%.

5.  Global left ventricular hypokinesis.

6.  Moderately reduced right ventricular systolic function.

7.  Moderately dilated left and right atrium.

8.  Moderately thickened aortic valve.

9.  Moderately thickened mitral valve with moderate mitral regurgitation.

10.  Moderate tricuspid regurgitation with moderate pulmonary arterial

hypertension with right ventricular systolic pressure of 52 mmHg.

11.  Atrial fibrillation with rapid ventricular response.

12.  Gait dysfunction.

13.  Bilateral lower extremity lymphedema and venous stasis.

14.  Leukopenia.

15.  Anemia.

16.  Lactic acidosis.

17.  Hypokalemia.

18.  Hyperbilirubinemia.

19.  Elevated prostate-specific antigen.

20.  Hyperbilirubinemia.

21.  Hyponatremia.

22.  Possible syndrome of inappropriate antidiuretic hormone.

23.  Systolic congestive heart failure with elevated BNP.

24.  Systolic and diastolic congestive heart failure with elevated BNP,

decreased left ventricular ejection fraction of 21% and moderate pulmonary

arterial hypertension, moderate tricuspid regurgitation.

1.  Hypertension.

2.  Atrial fibrillation with rapid ventricular response.

3.  Acute systolic and diastolic congestive heart failure with decreased

left ventricular ejection fraction of 20% and abnormal weight gain and

bilateral lower extremity lymphedema.

4. Leukopenia.

5.  Transient lactic acidosis.

6.  Hyponatremia, possibly syndrome of inappropriate antidiuretic hormone

secretion.

7.  Hypomagnesemia.

8.  Hyperbilirubinemia.

9.  Elevated prostate-specific antigen of greater than 90.

10.  Gait dysfunction.

11.  Deconditioning.

12.  Proteinuria, hematuria, bacteriuria.

13.  Axial skeleton diffuse osseous metastatic disease with abnormal

increased uptake of the thoracolumbar spine, right iliac bone, posterior

rib in the right rib, bilateral scapula, proximal femur bilaterally

including the lesser trochanter on the right and subtrochanteric region on

the left with increased uptake in the sternum.

14.  Cerebral cortical atrophy of the brain with moderate volume loss.

15.  Centrilobular and paraseptal emphysema with atelectasis scarring.

16.  Subcentimeter pulmonary nodules of 0.3 cm.

17.  Bilateral pleural effusion.

18.  Cardiomegaly.

19.  Scattered sclerotic bony lesion.

20.  Moderate diffuse stranding of the subcutaneous tissue and anasarca.

21.  Bilateral adrenal gland hypertrophy.

22.  Probable right renal cyst.

23.  Colonic diverticulosis.

24.  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

25.  Degenerative joint disease of the spine.

26.  Small fat-containing umbilical hernia.

27.  Small fat-containing inguinal hernia.

28.  Subcentimeter retroperitoneal lymphadenopathy.

29.  Dilated cardiomyopathy with left ventricular ejection fraction of 21%.

30.  Concentric left ventricular hypertrophy.

31.  Moderate-to-severely impaired left ventricular systolic function with

left ventricular ejection fraction of 21% and left ventricular global

hypokinesis.

32.  Moderately reduced right ventricular systolic function.

33.  Moderately dilated left and right atrium.

34.  Moderately thickened aortic valve.

35.  Moderately thickened mitral valve.

36.  Moderate mitral regurgitation.

37.  Moderate tricuspid regurgitation with moderate pulmonary arterial

hypertension with right ventricular systolic pressure of 52 mmHg.

38.  History of poor compliance and noncompliance.

39.  Hypervolemic hyponatremia secondary to systolic congestive heart

failure.

40.  Increased urinary retention.

41.  Possible and probable stage IV prostate carcinoma with bone metastasis

and extremely high PSA of greater than 90.

1.  Atrial fibrillation with rapid ventricular response.

2.  Noel-inferolateral coronary ischemia.

3.  History of hypertension.

4.  Most possible and most likely systolic congestive heart failure with

elevated BNP.

5.  Cardiomegaly and pulmonary vascular congestion, suggestive of

congestive heart failure.

6.  Leukopenia.

7.  Lactic acidosis.

8.  Hyponatremia, etiology undetermined.

9.  Questionable syndrome of inappropriate (excretion) of antidiuretic

hormone.

10.  Indeterminate troponin.

11.  Elevated BNP suggestive of congestive heart failure.

12.  Trace proteinuria, trace microscopic hematuria, trace bacteriuria.

13.  Bilateral lower extremity lymphedema.

14.  Anasarca.

15.  Extremely poor compliance and noncompliance.

16.  History of hypovitaminosis D.

17.  History of hyperuricemia, hypomagnesemia and history of elevated

prostate-specific antigen.



PLAN:  At this time, the patient is to be discharged.  The patient has been

cleared for discharge by all subspecialty including Nephrology.  The

patient is to be discharged home or subacute rehab as approved by the

patient's insurance, if the patient is not accepted to [facility] and if the repeat potassium is normal today.  Repeat potassium

has been ordered to 11:00 a.m.  Discharge medications as per updated

ambulatory orders and the new prescription.



DISCHARGE MEDICATIONS:  Include,

1.  Allopurinol 300 mg daily.

2.  Eliquis 2.5 mg b.i.d.

3.  Ecotrin 81 mg daily.

4.  Lipitor 40 mg daily.

5.  Vitamin D3 2000 units daily.

6.  Digoxin 0.125 mg daily.

7.  Cardizem  mg daily.

8.  Colace 100 mg three times a day.

9.  Vitamin D 250,000 units every week.

10.  Uloric 40 mg daily or allopurinol 300 mg daily.

11.  Folic acid 1 mg daily.

12.  Lasix 40 mg p.o. daily.

13.  Magnesium oxide 800 mg twice a day.

14.  Lopressor 50 mg twice a day.

15.  Protonix 40 mg daily.

16.  MiraLax 17 g twice a day.

17.  Flomax 0.4 mg daily.

18.  Tolvaptan 15 mg every 48 hour for 10 doses.



The patient's case is referred to  for discharge planning.



The patient has been updated about his diagnoses, test results,

recommendation by all the physicians involved in the care of the patient. 

The patient has been advised that if the patient is accepted to subacute

rehab, upon discharge, the patient needs to follow up with Dr. Márquez right

away and the oncologist right away.  The patient has been explained about

the diagnosis of the prostate cancer spread to the bone in layman's

language which he acknowledged to understand.



Time spent in the entire discharge process, more than 45 minutes.



Dictated and electronically signed, not read.





__________________________________________

Dale Márquez MD





DD:  02/20/2018 10:59:09

DT:  02/20/2018 11:04:31

Job # [id number]





MTDD

## 2018-03-01 ENCOUNTER — HOSPITAL ENCOUNTER (INPATIENT)
Dept: HOSPITAL 42 - ED | Age: 78
LOS: 6 days | Discharge: NURSING, REHAB OR LONG-TERM CARE | DRG: 643 | End: 2018-03-07
Attending: INTERNAL MEDICINE | Admitting: INTERNAL MEDICINE
Payer: MEDICARE

## 2018-03-01 VITALS — BODY MASS INDEX: 29.2 KG/M2

## 2018-03-01 DIAGNOSIS — E22.2: Primary | ICD-10-CM

## 2018-03-01 DIAGNOSIS — N40.0: ICD-10-CM

## 2018-03-01 DIAGNOSIS — J18.9: ICD-10-CM

## 2018-03-01 DIAGNOSIS — E78.5: ICD-10-CM

## 2018-03-01 DIAGNOSIS — N18.3: ICD-10-CM

## 2018-03-01 DIAGNOSIS — D63.8: ICD-10-CM

## 2018-03-01 DIAGNOSIS — R26.81: ICD-10-CM

## 2018-03-01 DIAGNOSIS — E79.0: ICD-10-CM

## 2018-03-01 DIAGNOSIS — I13.0: ICD-10-CM

## 2018-03-01 DIAGNOSIS — L03.115: ICD-10-CM

## 2018-03-01 DIAGNOSIS — Z79.01: ICD-10-CM

## 2018-03-01 DIAGNOSIS — E86.1: ICD-10-CM

## 2018-03-01 DIAGNOSIS — E55.9: ICD-10-CM

## 2018-03-01 DIAGNOSIS — C79.51: ICD-10-CM
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
## 2018-03-01 DIAGNOSIS — G31.9: ICD-10-CM

## 2018-03-01 DIAGNOSIS — E83.42: ICD-10-CM

## 2018-03-01 DIAGNOSIS — C61: ICD-10-CM

## 2018-03-01 DIAGNOSIS — I27.21: ICD-10-CM

## 2018-03-01 DIAGNOSIS — I48.2: ICD-10-CM

## 2018-03-01 DIAGNOSIS — Z87.891: ICD-10-CM

## 2018-03-01 DIAGNOSIS — I50.22: ICD-10-CM

## 2018-03-01 DIAGNOSIS — M62.82: ICD-10-CM

## 2018-03-01 DIAGNOSIS — E86.0: ICD-10-CM

## 2018-03-01 DIAGNOSIS — I42.0: ICD-10-CM

## 2018-03-01 DIAGNOSIS — J90: ICD-10-CM

## 2018-03-01 DIAGNOSIS — E87.5: ICD-10-CM

## 2018-03-01 DIAGNOSIS — R31.29: ICD-10-CM

## 2018-03-01 DIAGNOSIS — M81.0: ICD-10-CM

## 2018-03-01 DIAGNOSIS — K59.00: ICD-10-CM

## 2018-03-01 LAB
ALBUMIN SERPL-MCNC: 3.5 G/DL (ref 3–4.8)
ALBUMIN/GLOB SERPL: 0.9 {RATIO} (ref 1.1–1.8)
ALT SERPL-CCNC: 82 U/L (ref 7–56)
APPEARANCE UR: (no result)
APTT BLD: 36.5 SECONDS (ref 25.1–36.5)
AST SERPL-CCNC: 145 U/L (ref 17–59)
BACTERIA #/AREA URNS HPF: (no result) /[HPF]
BASOPHILS # BLD AUTO: 0.01 K/MM3 (ref 0–2)
BASOPHILS NFR BLD: 0.1 % (ref 0–3)
BILIRUB UR-MCNC: NEGATIVE MG/DL
BUN SERPL-MCNC: 21 MG/DL (ref 7–21)
BUN SERPL-MCNC: 22 MG/DL (ref 7–21)
CALCIUM SERPL-MCNC: 9.2 MG/DL (ref 8.4–10.5)
CALCIUM SERPL-MCNC: 9.5 MG/DL (ref 8.4–10.5)
CK MB SERPL-MCNC: 10.4 NG/ML (ref 0–3.6)
CK MB SERPL-MCNC: 10.9 NG/ML (ref 0–3.6)
CK MB SERPL-MCNC: 11.1 NG/ML (ref 0–3.6)
CK MB%: 2.2 % (ref 2.5–3)
CK MB%: 2.2 % (ref 2.5–3)
CK MB%: 2.5 % (ref 2.5–3)
COLOR UR: YELLOW
EOSINOPHIL # BLD: 0 10*3/UL (ref 0–0.7)
EOSINOPHIL NFR BLD: 0.4 % (ref 1.5–5)
EPI CELLS #/AREA URNS HPF: (no result) /HPF (ref 0–5)
ERYTHROCYTE [DISTWIDTH] IN BLOOD BY AUTOMATED COUNT: 14.5 % (ref 11.5–14.5)
GFR NON-AFRICAN AMERICAN: 58
GFR NON-AFRICAN AMERICAN: 58
GLUCOSE UR STRIP-MCNC: NEGATIVE MG/DL
GRANULOCYTES # BLD: 4.83 10*3/UL (ref 1.4–6.5)
GRANULOCYTES NFR BLD: 70.5 % (ref 50–68)
HEPATITIS A IGM: NEGATIVE
HEPATITIS B CORE AB: NEGATIVE
HEPATITIS C ANTIBODY: NEGATIVE
HGB BLD-MCNC: 12.8 G/DL (ref 14–18)
INR PPP: 1.59 (ref 0.93–1.08)
LEUKOCYTE ESTERASE UR-ACNC: (no result) LEU/UL
LYMPHOCYTES # BLD: 1.1 10*3/UL (ref 1.2–3.4)
LYMPHOCYTES NFR BLD AUTO: 16.6 % (ref 22–35)
MCH RBC QN AUTO: 28.3 PG (ref 25–35)
MCHC RBC AUTO-ENTMCNC: 35.3 G/DL (ref 31–37)
MCV RBC AUTO: 80.1 FL (ref 80–105)
MONOCYTES # BLD AUTO: 0.9 10*3/UL (ref 0.1–0.6)
MONOCYTES NFR BLD: 12.4 % (ref 1–6)
PH UR STRIP: 6 [PH] (ref 4.7–8)
PLATELET # BLD: 191 10^3/UL (ref 120–450)
PMV BLD AUTO: 9.8 FL (ref 7–11)
PROT UR STRIP-MCNC: (no result) MG/DL
PROTHROMBIN TIME: 18.5 SECONDS (ref 9.4–12.5)
RBC # BLD AUTO: 4.53 10^6/UL (ref 3.5–6.1)
RBC # UR STRIP: (no result) /UL
SP GR UR STRIP: 1.01 (ref 1–1.03)
TROPONIN I SERPL-MCNC: 0.02 NG/ML
TROPONIN I SERPL-MCNC: 0.03 NG/ML
TROPONIN I SERPL-MCNC: 0.03 NG/ML
UROBILINOGEN UR STRIP-ACNC: 0.2 E.U./DL
WBC # BLD AUTO: 6.9 10^3/UL (ref 4.5–11)

## 2018-03-01 RX ADMIN — POLYETHYLENE GLYCOL 3350 SCH GM: 17 POWDER, FOR SOLUTION ORAL at 11:19

## 2018-03-01 RX ADMIN — DIGOXIN SCH MG: 0.12 TABLET ORAL at 15:20

## 2018-03-01 RX ADMIN — Medication SCH MG: at 11:20

## 2018-03-01 RX ADMIN — PANTOPRAZOLE SODIUM SCH MG: 20 TABLET, DELAYED RELEASE ORAL at 15:31

## 2018-03-01 RX ADMIN — POLYETHYLENE GLYCOL 3350 SCH GM: 17 POWDER, FOR SOLUTION ORAL at 17:17

## 2018-03-01 RX ADMIN — Medication SCH MG: at 17:17

## 2018-03-01 RX ADMIN — DILTIAZEM HYDROCHLORIDE SCH MG: 240 CAPSULE, COATED, EXTENDED RELEASE ORAL at 11:13

## 2018-03-01 RX ADMIN — CEFTRIAXONE SCH MLS/HR: 2 INJECTION, POWDER, FOR SOLUTION INTRAMUSCULAR; INTRAVENOUS at 11:20

## 2018-03-01 RX ADMIN — LEVALBUTEROL SCH MG: 0.63 SOLUTION RESPIRATORY (INHALATION) at 10:56

## 2018-03-01 NOTE — CARD
--------------- APPROVED REPORT --------------





EKG Measurement

Heart [id number]

[id number]

[id number]

[id number]



<Conclusion>

Atrial fibrillation Moderate Rate.

ST_T Changes.

## 2018-03-01 NOTE — CP.PCM.CON
History of Present Illness





- History of Present Illness


History of Present Illness: 





Critical Care Consult Note





HPI


Patient is 78yo male with PMhx of Prostate CA, CHF, Afib on A/C, HTN, presents 

from NH for periods of confusion. Pt reports he has no major complaints, 

patient is AAOx3, NAD, comfortable. Pt denies fever, chills, cough, chest pain, 

sob, palpitations, HA, dizziness. Endorses BALLESTEROS which is chronic, and LE edema. 

No other constitutional symptoms. Labs in the ER showed Na 115, given 3% saline 

100cc. Repeat Na 117. 





PMHx as above


PSHx as above


Allergies NKDA


Meds as per EMR


ROS as above


FHx NC





Review of Systems





- Review of Systems


Review of Systems: 





as per HPI





Past Patient History





- Infectious Disease


Hx of Infectious Diseases: None





- Tetanus Immunizations


Tetanus Immunization: Unknown





- Past Social History


Smoking Status: Never Smoked





- CARDIAC


Hx Cardiac Disorders: Yes (Afib)


Hx Hypertension: Yes





- PULMONARY


Hx Respiratory Disorders: No





- NEUROLOGICAL


Hx Neurological Disorder: No





- HEENT


Hx HEENT Problems: No





- RENAL


Hx Chronic Kidney Disease: No





- ENDOCRINE/METABOLIC


Hx Endocrine Disorders: No





- HEMATOLOGICAL/ONCOLOGICAL


Hx Blood Disorders: No





- INTEGUMENTARY


Hx Dermatological Problems: No





- MUSCULOSKELETAL/RHEUMATOLOGICAL


Hx Musculoskeletal Disorders: Yes


Hx Unsteady Gait: Yes





- GASTROINTESTINAL


Hx Gastrointestinal Disorders: No





- GENITOURINARY/GYNECOLOGICAL


Hx Genitourinary Disorders: No





- PSYCHIATRIC


Hx Depression: No


Hx Emotional Abuse: No


Hx Physical Abuse: No


Hx Substance Use: No





- SURGICAL HISTORY


Hx Surgeries: No





- ANESTHESIA


Hx Anesthesia: No





Meds


Allergies/Adverse Reactions: 


 Allergies











Allergy/AdvReac Type Severity Reaction Status Date / Time


 


No Known Allergies Allergy   Verified 01/28/14 15:34














- Medications


Medications: 


 Current Medications





Allopurinol (Zyloprim)  300 mg PO DAILY MATTHIAS


Apixaban (Eliquis)  2.5 mg PO BID MATTHIAS


   PRN Reason: Protocol


Aspirin (Ecotrin)  81 mg PO DAILY MATTHIAS


Atorvastatin Calcium (Lipitor)  40 mg PO DIN AMTTHIAS


Bisacodyl (Dulcolax)  10 mg RC DAILY PRN


   PRN Reason: Constipation


Digoxin (Digoxin)  0.125 mg PO 1400 [facility]


Diltiazem HCl (Cardizem Cd)  240 mg PO DAILY [facility]


Docusate Sodium (Colace)  100 mg PO TID MATTHIAS


Magnesium Oxide (Mag-Ox)  800 mg PO BID [facility]


Metoprolol Tartrate (Lopressor)  50 mg PO BID [facility]


Pantoprazole Sodium (Protonix Ec Tab)  20 mg PO ACB [facility]


   Last Admin: 03/01/18 07:37 Dose:  20 mg


Polyethylene Glycol (Miralax)  17 gm PO BID [facility]











Physical Exam





- Constitutional


Appears: Well, Non-toxic, No Acute Distress





- Head Exam


Head Exam: NORMAL INSPECTION





- Eye Exam


Eye Exam: Normal appearance





- ENT Exam


ENT Exam: Mucous Membranes Dry





- Neck Exam


Neck exam: Positive for: Full Rom





- Respiratory Exam


Respiratory Exam: Clear to Auscultation Bilateral, NORMAL BREATHING PATTERN





- Cardiovascular Exam


Cardiovascular Exam: REGULAR RHYTHM, +S1, +S2





- GI/Abdominal Exam


GI & Abdominal Exam: Normal Bowel Sounds, Soft





- Extremities Exam


Extremities exam: Positive for: normal inspection





- Neurological Exam


Neurological exam: Alert, Oriented x3





Results





- Vital Signs


Recent Vital Signs: 


 Last Vital Signs











Temp  98.2 F   03/01/18 04:26


 


Pulse  88   03/01/18 08:59


 


Resp  18   03/01/18 08:59


 


BP  137/65   03/01/18 08:59


 


Pulse Ox  100   03/01/18 08:59














- Labs


Result Diagrams: 


 03/01/18 04:45





 03/01/18 09:20


Labs: 


 Laboratory Results - last 24 hr











  03/01/18





  09:20


 


Sodium  117 L*


 


Potassium  4.8


 


Chloride  84 L


 


Carbon Dioxide  22


 


Anion Gap  17


 


BUN  21


 


Creatinine  1.2


 


Est GFR ( Amer)  > 60


 


Est GFR (Non-Af Amer)  58


 


Random Glucose  83


 


Calcium  9.2














- Imaging and Cardiology


  ** Chest x-ray


Status: Image reviewed by me, Report reviewed by me





Assessment & Plan





- Assessment and Plan (Free Text)


Assessment: 





78yo male a/w severe hyponatremia











Hyponatremia


Confusion


Hx Prostate Ca


Dehydration


CHF, chronic


Afib on A/C








Recommend:


- supp o2 as needed


- pancutlure, BCx, UCx, Procal


- BP control


- resume A/C


- Cardiology eval


- IVF hydration, renal consult


- check Ulytes, Cortisol level, TSH, Uric Acid


- neuro checks


- Q6hr BMP


- RUQ sono


- GI eval for elevated LFTs


- GI ppx


- DVT ppx


- Monitor in MICU

## 2018-03-01 NOTE — CT
PROCEDURE:  CT Chest, Abdomen and Pelvis without intravenous contrast



HISTORY:

PNEUMONIA/PROSTATE CA/HYPONATREMIA



COMPARISON:

02/08/2018



TECHNIQUE:

Radiation dose:



Total exam DLP = 1100 mGy-cm.



This CT exam was performed using one or more of the following dose 

reduction techniques: Automated exposure control, adjustment of the 

mA and/or kV according to patient size, and/or use of iterative 

reconstruction technique.



FINDINGS:



CT CHEST WITHOUT CONTRAST:



LUNGS:

There is focal consolidation at the right lung base adjacent to the 

pleural effusion



MEDIASTINUM:

Unremarkable. Normal caliber aorta and pulmonary arterial trunk. 

Normal size heart.



LYMPH NODES:

Unremarkable.



PLEURA:

Moderate size right pleural effusion



BONES:

Unremarkable.



OTHER FINDINGS:

None.



CT ABDOMEN AND PELVIS:



LIVER:

Unremarkable. No gross lesion or ductal dilatation. 



GALLBLADDER AND BILE DUCTS:

Unremarkable. 



PANCREAS:

Unremarkable. No gross lesion or ductal dilatation.



SPLEEN:

Unremarkable. 



ADRENALS:

Unremarkable. No mass. 



KIDNEYS AND URETERS:

Unremarkable. No hydronephrosis. No solid mass. 



VASCULATURE:

Unremarkable. No aortic aneurysm. 



BOWEL:

Unremarkable. No obstruction. No gross mural thickening. 



APPENDIX:

Normal appendix. 



PERITONEUM:

Unremarkable. No free fluid. No free air. 



LYMPH NODES:

Unremarkable. No enlarged lymph nodes. 



BLADDER:

There is a Fraire catheter in the bladder. The bladder is decompressed 



REPRODUCTIVE:

Unremarkable. 



BONES:

There is bony sclerosis throughout the L1 vertebral body suspicious 

for metastatic disease. There is also a sclerotic lesion in the mid 

thoracic spine 



OTHER FINDINGS:

None.



IMPRESSION:

Right lower lobe consolidation with moderate to large right pleural 

effusion.



Sclerotic lesions in the spine consistent with metastatic disease.

## 2018-03-01 NOTE — CP.PCM.CON
History of Present Illness





- History of Present Illness


History of Present Illness: 





77 year old male with a history of CHF, afib on Eliquis, stage IV hormone naive 

prostate cancer diagnosed 2/2018, admitted with AMS from rehab, found to have 

hyponatremia.  The patient was recently discharged after being found to have 

diffuse bone lesions s/p percutaneous biopsy confirming metastatic prostate 

cancer.  He is conversational but his mental status is not like his prior 

admission.  It appears his sodium is improving.  He does have some 

conversational dyspnea.  





Past medical history:  CHF, afib





Past surgical history:  None





Family history:  Father had throat cancer





Social history:  Former 1/2ppd x 50 years, former alcohol, denies illicit drug 

use.





Allergies:  NKA





Review of systems:  All remaining review of systems including HEENT, 

cardiovascular, respiratory, gastrointestinal, genitourinary, musculoskeletal, 

dermatologic, neurologic and psychiatric are negative unless mentioned in the 

HPI.





Past Patient History





- Infectious Disease


Hx of Infectious Diseases: None





- Tetanus Immunizations


Tetanus Immunization: Unknown





- Past Social History


Smoking Status: Never Smoked





- CARDIAC


Hx Cardiac Disorders: Yes (Afib)


Hx Hypertension: Yes





- PULMONARY


Hx Respiratory Disorders: No





- NEUROLOGICAL


Hx Neurological Disorder: No





- HEENT


Hx HEENT Problems: No





- RENAL


Hx Chronic Kidney Disease: No





- ENDOCRINE/METABOLIC


Hx Endocrine Disorders: No





- HEMATOLOGICAL/ONCOLOGICAL


Hx Blood Disorders: No





- INTEGUMENTARY


Hx Dermatological Problems: No





- MUSCULOSKELETAL/RHEUMATOLOGICAL


Hx Musculoskeletal Disorders: Yes


Hx Unsteady Gait: Yes





- GASTROINTESTINAL


Hx Gastrointestinal Disorders: No





- GENITOURINARY/GYNECOLOGICAL


Hx Genitourinary Disorders: No





- PSYCHIATRIC


Hx Depression: No


Hx Emotional Abuse: No


Hx Physical Abuse: No


Hx Substance Use: No





- SURGICAL HISTORY


Hx Surgeries: No





- ANESTHESIA


Hx Anesthesia: No





Meds


Allergies/Adverse Reactions: 


 Allergies











Allergy/AdvReac Type Severity Reaction Status Date / Time


 


No Known Allergies Allergy   Verified 01/28/14 15:34














- Medications


Medications: 


 Current Medications





Allopurinol (Zyloprim)  300 mg PO DAILY [facility]


   Last Admin: 03/01/18 11:13 Dose:  300 mg


Apixaban (Eliquis)  2.5 mg PO BID [facility]


   PRN Reason: Protocol


   Last Admin: 03/01/18 17:18 Dose:  2.5 mg


Aspirin (Ecotrin)  81 mg PO DAILY [facility]


   Last Admin: 03/01/18 11:14 Dose:  81 mg


Atorvastatin Calcium (Lipitor)  40 mg PO DIN [facility]


   Last Admin: 03/01/18 17:18 Dose:  40 mg


Bisacodyl (Dulcolax)  10 mg RC DAILY [facility]


   PRN Reason: Constipation


Digoxin (Digoxin)  0.125 mg PO 1400 [facility]


   Last Admin: 03/01/18 15:20 Dose:  0.125 mg


Diltiazem HCl (Cardizem Cd)  240 mg PO DAILY [facility]


   Last Admin: 03/01/18 11:13 Dose:  240 mg


Docusate Sodium (Colace)  100 mg PO TID [facility]


   Last Admin: 03/01/18 17:17 Dose:  100 mg


Doxycycline Hyclate (Doryx)  100 mg PO Q12 [facility]


   PRN Reason: Protocol


   Last Admin: 03/01/18 15:17 Dose:  100 mg


Ceftriaxone Sodium (Rocephin 2 Gm Ivpb)  2 gm in 100 mls @ 100 mls/hr IVPB 

DAILY [facility]


   PRN Reason: Protocol


   Last Admin: 03/01/18 11:20 Dose:  100 mls/hr


Levalbuterol HCl (Xopenex)  0.63 mg IH [id number] [facility]


   Last Admin: 03/01/18 10:56 Dose:  0.63 mg


Levalbuterol HCl (Xopenex)  0.63 mg IH Q2H [facility]


   PRN Reason: Shortness of Breath


Magnesium Oxide (Mag-Ox)  800 mg PO BID [facility]


   Last Admin: 03/01/18 17:17 Dose:  800 mg


Metoprolol Tartrate (Lopressor)  50 mg PO BID [facility]


   Last Admin: 03/01/18 17:18 Dose:  50 mg


Pantoprazole Sodium (Protonix Ec Tab)  20 mg PO ACBD [facility]


   Last Admin: 03/01/18 15:31 Dose:  20 mg


Polyethylene Glycol (Miralax)  17 gm PO BID [facility]


   Last Admin: 03/01/18 17:17 Dose:  17 gm











Physical Exam





- Head Exam


Head Exam: ATRAUMATIC





- Eye Exam


Eye Exam: Normal appearance





- ENT Exam


ENT Exam: Mucous Membranes Dry





- Respiratory Exam


Respiratory Exam: Decreased Breath Sounds





- Cardiovascular Exam


Cardiovascular Exam: +S1, +S2





- GI/Abdominal Exam


GI & Abdominal Exam: Normal Bowel Sounds





- Extremities Exam


Extremities exam: Positive for: pedal edema





- Neurological Exam


Neurological exam: Altered





- Psychiatric Exam


Psychiatric exam: Normal Affect, Normal Mood





- Skin


Skin Exam: Warm





Results





- Vital Signs


Recent Vital Signs: 


 Last Vital Signs











Temp  98.2 F   03/01/18 04:26


 


Pulse  64   03/01/18 17:35


 


Resp  27 H  03/01/18 17:21


 


BP  118/67   03/01/18 17:18


 


Pulse Ox  100   03/01/18 08:59














- Labs


Result Diagrams: 


 03/01/18 04:45





 03/01/18 20:55


Labs: 


 Laboratory Results - last 24 hr











  03/01/18 03/01/18 03/01/18





  09:20 10:12 12:00


 


Sodium  117 L*  Cancelled  119 L*


 


Potassium  4.8  Cancelled 


 


Chloride  84 L  Cancelled 


 


Carbon Dioxide  22  Cancelled 


 


Anion Gap  17  Cancelled 


 


BUN  21  Cancelled 


 


Creatinine  1.2  


 


Est GFR ( Amer)  > 60  Cancelled 


 


Est GFR (Non-Af Amer)  58  Cancelled 


 


Random Glucose  83  Cancelled 


 


Calcium  9.2  Cancelled 


 


Total Creatine Kinase   493 H 


 


CK-MB (CK-2)   10.9 H 


 


CK-MB (CK-2) %   2.2 L 


 


Troponin I   0.03 














  03/01/18 03/01/18





  16:25 17:56


 


Sodium  Cancelled  120 L


 


Potassium  Cancelled 


 


Chloride  Cancelled 


 


Carbon Dioxide  Cancelled 


 


Anion Gap  Cancelled 


 


BUN  Cancelled 


 


Creatinine  


 


Est GFR ( Amer)  Cancelled 


 


Est GFR (Non-Af Amer)  Cancelled 


 


Random Glucose  Cancelled 


 


Calcium  Cancelled 


 


Total Creatine Kinase  436 H 


 


CK-MB (CK-2)  11.1 H 


 


CK-MB (CK-2) %  2.5 


 


Troponin I  0.02  D 














Assessment & Plan


(1) Anemia


Assessment and Plan: 


Prior w/u consistent with anemia of chronic disease and bone marrow metastasis


Status: Acute   





(2) Prostate cancer


Assessment and Plan: 


stage IV with bone metastasis


hormone naive


will start peripheral androgen blockage with Casodex daily


central androgen blockade to be added in about 2 weeks.





Thank you for this interesting consult.


Status: Acute

## 2018-03-01 NOTE — CT
EXAM:

  CT Head Without Intravenous Contrast



CLINICAL HISTORY:

  77 years old, male; Signs and symptoms; Altered mental status/memory loss; 

Confusion or disorientation; Additional info: AMS



TECHNIQUE:

  Axial computed tomography images of the head/brain without intravenous 

contrast.  All CT scans at this facility use one or more dose reduction 

techniques, viz.: automated exposure control; ma/kV adjustment per patient size 

(including targeted exams where dose is matched to indication; i.e. head); or 

iterative reconstruction technique.

  Coronal and sagittal reformatted images were created and reviewed.



COMPARISON:

  MR - BRAIN W   WO CONTRAST 2018-02-09 10:33



FINDINGS:

  Brain:  Moderate atrophy.  No intracranial hemorrhage.  No mass.  Minimal 

decreased attenuation within periventricular white matter.  Probable chronic 

lacunar infarct about RIGHT basal ganglia.  No definite edema.

  Ventricles:  No hydrocephalus.

  Bones/joints:  No acute fracture.

  Soft tissues:  Unremarkable.

  Vasculature:  Atherosclerotic disease of intracranial arteries.

  Sinuses:  No acute sinusitis.

  Mastoid air cells:  No mastoid effusion.

  Orbits:  Unremarkable as visualized.



IMPRESSION:     

1.  Nonspecific white matter changes.  Acute infarction may be CT occult within 

first 24 hours.  If a focal deficit persists, consider followup CT or MRI for 

further evaluation.

2.  Incidental/non-acute findings are described above.

## 2018-03-01 NOTE — CP.PCM.CON
History of Present Illness





- History of Present Illness


History of Present Illness: 





Nephrology Consultation Note





Assessment: Critical


Hypervolemic hyponatremia likely Multifactorial due to CHF, SIADH Caused by 

metastatic prostate CA


Hypertensive Chronic Kidney Disease (I12.9)


Altered MENTAL status


Chronic Kidney Disease (N18.3) Stage 3 with Cr 1.2 


metastatic cancer, HTN (I12.9)


CHF LVEF 20%


hx of BPH


Vitamin D deficiency





Plan


No acute need for renal replacement therapy at this time. 


Current patient was on hypertonic saline his repeat sodium is 119 initially his 

sodium was 115. Stop hypertonic saline now.  Resume low-salt diet with oral 

fluid fluid restriction 1000 m L per day.  Monitor serum sodium every 6 hours.  

Avoid correction serum sodium more than 6-8 meq over 24 hours


We'll consider resuming diuretics or Samsca tomorrow


Check urine osm and urine sodium


Hypertension control with meds as ordered. unable to tolerate losartan due to 

hyperkalemia.


Monitor Input/Output, daily weights and renal function with basic metabolic 

panel


supplement electrolytes as needed


Avoid nephrotoxins/NSAIDs


Glycemic control


Further work up for as per primary team





Thanks for allowing me to participate in care of your patient. Will follow 

patient with you. Please call if any Qs. d/w team. 


Dr Rickey Tapia


Office: [phone] Cell: [phone] Fax: [phone]





Chief complaint low salt 


Reason for consultation is Hyponatremia


HPI patient is 77-year-old male with history of chronic systolic CHF EF 20% 

also metastatic prostate cancer , BPH, recently seen for hyponatremia due to 

CHF and likely SIADH which was treated with samsca and and diuretic. Discharged 

to nursing home here with the acute change in mentaL status and low sodium of 

115 hence renal consult was requested. Patient at this time Feels better.He 

denies shortness of breath nausea vomiting any pain in his stomach.





Subjective/ROS: Noted events overnight. Patients feels okay. 


Denies chest pain, palpitation,improved shortness of breath, denies leg 

swelling. 


All other negative Except as mentioned in HPI. had bone biopsy by IR on 2/13/18 

Showed metastatic adeno carcinoma of prostate





Physical Examination:      


General Appearance: Comfortable, in no acute respiratory distress, co-operative 

. 


Vitals reviewed and noted as below


Head; Atraumatic, normocephalic


ENT: no ulcers no thrush. Tongue is midline. Oropharynx: no rash or ulcers.


EYES: Pupils are equal, round and reactive to light accommodation. Eye muscles 

and extraocular movement intact. Sclera is anicteric.


Neck; supple no lymphadenopathy, no thyromegaly or bruit


Lungs: Normal respiratory rate/effort. Breath sounds reduced at base


Heart: Normal rate. s1s2 normal. No rub or gallop. 


Extremities: RLE 1+ edema. No varicose veins


Neurological: Patient is alert, awake and oriented to person, place and time. 

No focal deficit. Strength bilateral appropriate and equal


Skin: Warm and dry. Normal turgor. No rash. Palpitation: Normal elasticity for 

age. Feets somewhat cool to touch


Abdomen: Abdomen is soft. Bowel sounds +. There is no abdominal tenderness, no 

guarding/rigidity no organomegaly


Psych: normal insight and normal affect/mood


MSK: no joint tenderness or swelling. Digits and nails normal, no deformity


: kidney or bladder not palpable. Has Fraire catheter in place





Labs/imaging reviewed.


Past medical history, past surgical history, family history, social history, 

allergy reviewed and noted as below


Family hx: no hx of CKD. Rest non-contributory 





Past Patient History





- Infectious Disease


Hx of Infectious Diseases: None





- Tetanus Immunizations


Tetanus Immunization: Unknown





- Past Social History


Smoking Status: Never Smoked





- CARDIAC


Hx Cardiac Disorders: Yes (Afib)


Hx Hypertension: Yes





- PULMONARY


Hx Respiratory Disorders: No





- NEUROLOGICAL


Hx Neurological Disorder: No





- HEENT


Hx HEENT Problems: No





- RENAL


Hx Chronic Kidney Disease: No





- ENDOCRINE/METABOLIC


Hx Endocrine Disorders: No





- HEMATOLOGICAL/ONCOLOGICAL


Hx Blood Disorders: No





- INTEGUMENTARY


Hx Dermatological Problems: No





- MUSCULOSKELETAL/RHEUMATOLOGICAL


Hx Musculoskeletal Disorders: Yes


Hx Unsteady Gait: Yes





- GASTROINTESTINAL


Hx Gastrointestinal Disorders: No





- GENITOURINARY/GYNECOLOGICAL


Hx Genitourinary Disorders: No





- PSYCHIATRIC


Hx Depression: No


Hx Emotional Abuse: No


Hx Physical Abuse: No


Hx Substance Use: No





- SURGICAL HISTORY


Hx Surgeries: No





- ANESTHESIA


Hx Anesthesia: No





Meds


Allergies/Adverse Reactions: 


 Allergies











Allergy/AdvReac Type Severity Reaction Status Date / Time


 


No Known Allergies Allergy   Verified 01/28/14 15:34














- Medications


Medications: 


 Current Medications





Allopurinol (Zyloprim)  300 mg PO DAILY [facility]


   Last Admin: 03/01/18 11:13 Dose:  300 mg


Apixaban (Eliquis)  2.5 mg PO BID [facility]


   PRN Reason: Protocol


Aspirin (Ecotrin)  81 mg PO DAILY [facility]


   Last Admin: 03/01/18 11:14 Dose:  81 mg


Atorvastatin Calcium (Lipitor)  40 mg PO DIN [facility]


Bisacodyl (Dulcolax)  10 mg RC DAILY [facility]


   PRN Reason: Constipation


Digoxin (Digoxin)  0.125 mg PO 1400 [facility]


Diltiazem HCl (Cardizem Cd)  240 mg PO DAILY [facility]


   Last Admin: 03/01/18 11:13 Dose:  240 mg


Docusate Sodium (Colace)  100 mg PO TID [facility]


   Last Admin: 03/01/18 11:13 Dose:  100 mg


Doxycycline Hyclate (Doryx)  100 mg PO Q12 [facility]


   PRN Reason: Protocol


Ceftriaxone Sodium (Rocephin 2 Gm Ivpb)  2 gm in 100 mls @ 100 mls/hr IVPB 

DAILY [facility]


   PRN Reason: Protocol


   Last Admin: 03/01/18 11:20 Dose:  100 mls/hr


Levalbuterol HCl (Xopenex)  0.63 mg IH [id number] [facility]


   Last Admin: 03/01/18 10:56 Dose:  0.63 mg


Magnesium Oxide (Mag-Ox)  800 mg PO BID [facility]


   Last Admin: 03/01/18 11:20 Dose:  800 mg


Metoprolol Tartrate (Lopressor)  50 mg PO BID [facility]


   Last Admin: 03/01/18 11:14 Dose:  50 mg


Pantoprazole Sodium (Protonix Ec Tab)  20 mg PO ACBD [facility]


Polyethylene Glycol (Miralax)  17 gm PO BID [facility]


   Last Admin: 03/01/18 11:19 Dose:  17 gm











Results





- Vital Signs


Recent Vital Signs: 


 Last Vital Signs











Temp  98.2 F   03/01/18 04:26


 


Pulse  91 H  03/01/18 11:14


 


Resp  20   03/01/18 10:59


 


BP  149/99 H  03/01/18 11:14


 


Pulse Ox  100   03/01/18 08:59














- Labs


Result Diagrams: 


 03/01/18 04:45





 03/01/18 12:00


Labs: 


 Laboratory Results - last 24 hr











  03/01/18 03/01/18 03/01/18





  09:20 10:12 12:00


 


Sodium  117 L*  Cancelled  119 L*


 


Potassium  4.8  Cancelled 


 


Chloride  84 L  Cancelled 


 


Carbon Dioxide  22  Cancelled 


 


Anion Gap  17  Cancelled 


 


BUN  21  Cancelled 


 


Creatinine  1.2  


 


Est GFR ( Amer)  > 60  Cancelled 


 


Est GFR (Non-Af Amer)  58  Cancelled 


 


Random Glucose  83  Cancelled 


 


Calcium  9.2  Cancelled 


 


Total Creatine Kinase   493 H 


 


CK-MB (CK-2)   10.9 H 


 


CK-MB (CK-2) %   2.2 L 


 


Troponin I   0.03

## 2018-03-01 NOTE — CP.PCM.HP
History of Present Illness





- History of Present Illness


History of Present Illness: 





History and Physical for Dr. Márquez





77 year old male with past medical history of prostate cancer, CHF, A-fib on 

eliquis, and hypertension presents to the ED from nursing home for altered 

mental status. According to nursing home staff, patient had intermittent bouts 

of confusion and was sent to the ED. Patient was discovered to have severe 

hyponatremia and sent to the ICU. Upon examination, patient was alert and 

oriented x3. Patient states he was feeling fine and had no point of confusion. 

Patient denies chest pain, shortness of breath, nausea, vomiting, diarrhea, 

constipation, fever, chills, weight loss, syncope, numbness, tingling, changes 

in vision. 





PMH: prostate cancer, CHF, A-fib on eliquis, and hypertension 


Family history: Noncontributory


Social History: Former smoker, former alcohol use. No illicit drug use


Allergies: NKDA


Medication: Reviewed, as per MAR





Present on Admission





- Present on Admission


Any Indicators Present on Admission: No





Review of Systems





- Review of Systems


Review of Systems: 





12 point ROS as per HPI, otherwise negative. 





Past Patient History





- Infectious Disease


Hx of Infectious Diseases: None





- Tetanus Immunizations


Tetanus Immunization: Unknown





- Past Social History


Smoking Status: Never Smoked





- CARDIAC


Hx Cardiac Disorders: Yes (Afib)


Hx Hypertension: Yes





- PULMONARY


Hx Respiratory Disorders: No





- NEUROLOGICAL


Hx Neurological Disorder: No





- HEENT


Hx HEENT Problems: No





- RENAL


Hx Chronic Kidney Disease: No





- ENDOCRINE/METABOLIC


Hx Endocrine Disorders: No





- HEMATOLOGICAL/ONCOLOGICAL


Hx Blood Disorders: No





- INTEGUMENTARY


Hx Dermatological Problems: No





- MUSCULOSKELETAL/RHEUMATOLOGICAL


Hx Musculoskeletal Disorders: Yes


Hx Unsteady Gait: Yes





- GASTROINTESTINAL


Hx Gastrointestinal Disorders: No





- GENITOURINARY/GYNECOLOGICAL


Hx Genitourinary Disorders: No





- PSYCHIATRIC


Hx Depression: No


Hx Emotional Abuse: No


Hx Physical Abuse: No


Hx Substance Use: No





- SURGICAL HISTORY


Hx Surgeries: No





- ANESTHESIA


Hx Anesthesia: No





Meds


Allergies/Adverse Reactions: 


 Allergies











Allergy/AdvReac Type Severity Reaction Status Date / Time


 


No Known Allergies Allergy   Verified 01/28/14 15:34














Physical Exam





- Constitutional


Appears: Non-toxic, No Acute Distress





- Head Exam


Head Exam: ATRAUMATIC, NORMAL INSPECTION, NORMOCEPHALIC





- Eye Exam


Eye Exam: EOMI, Normal appearance





- ENT Exam


ENT Exam: Mucous Membranes Moist, Normal Exam





- Neck Exam


Neck exam: Positive for: Normal Inspection.  Negative for: Lymphadenopathy





- Respiratory Exam


Respiratory Exam: Decreased Breath Sounds, Rhonchi, NORMAL BREATHING PATTERN.  

absent: Rales, Wheezes





- Cardiovascular Exam


Cardiovascular Exam: RRR, +S1, +S2





- GI/Abdominal Exam


GI & Abdominal Exam: Firm, Normal Bowel Sounds.  absent: Rebound, Tenderness





- Extremities Exam


Extremities exam: Positive for: pedal edema (+1 edema b/l).  Negative for: calf 

tenderness





- Neurological Exam


Neurological exam: Alert, CN II-XII Intact, Oriented x3





- Psychiatric Exam


Psychiatric exam: Normal Affect, Normal Mood





- Skin


Skin Exam: Intact, Normal Color, Warm





Results





- Vital Signs


Recent Vital Signs: 





 Last Vital Signs











Temp  98.2 F   03/01/18 04:26


 


Pulse  88   03/01/18 08:59


 


Resp  18   03/01/18 08:59


 


BP  137/65   03/01/18 08:59


 


Pulse Ox  100   03/01/18 08:59














- Labs


Result Diagrams: 


 03/01/18 04:45





 03/01/18 09:20


Labs: 





 Laboratory Results - last 24 hr











  03/01/18 03/01/18





  09:20 10:12


 


Sodium  117 L*  Cancelled


 


Potassium  4.8  Cancelled


 


Chloride  84 L  Cancelled


 


Carbon Dioxide  22  Cancelled


 


Anion Gap  17  Cancelled


 


BUN  21  Cancelled


 


Creatinine  1.2 


 


Est GFR ( Amer)  > 60  Cancelled


 


Est GFR (Non-Af Amer)  58  Cancelled


 


Random Glucose  83  Cancelled


 


Calcium  9.2  Cancelled


 


Total Creatine Kinase   493 H


 


Troponin I   0.03














Assessment & Plan





- Assessment and Plan (Free Text)


Plan: 





77 year old male with past medical history of prostate cancer, CHF, A-fib on 

eliquis, and hypertension presents to the ED from nursing home for altered 

mental status likely secondary to severe hyponatremia. Patient alert and 

oriented at bedside. Patient received hypertonic saline in the ED. Patient will 

be continued on hypertonic saline as to improve sodium levels at the rate of 0.5

-1/hr. We will consult nephrology for his hyponatremia. Will follow sodium 

levels q6h. Patient also found to have possible infiltrates on chest x-ray, 

will be started on empiric antibiotics for CAP. Cardiology to be consulted for a

-fib. Patient also has a history of progressively worsening prostate cancer and 

will have oncology consulted. Will order chest/abdomen/pelvis CT to evaluate 

for worsening metastases. Patient also being followed by GI for acute elevation 

in transaminases. Continue current home medications at this time. We will 

continue to follow patient closely along with the ICU team. 





Campos, PGY-2

## 2018-03-01 NOTE — CON
DATE:  03/01/2018



CARDIOLOGY CONSULTATION



HISTORY OF PRESENT ILLNESS:  The patient is a 77-year-old male, who

presents from the subacute rehabilitation center for altered mental status.



The patient's past medical history includes documented severe

cardiomyopathy with an EF of 20% in the past.  This was done by

echocardiogram in 02/2018.



In addition, the patient suffers from chronic atrial fibrillation in which

the heart rate has been well controlled on digoxin as well as

anticoagulated with Eliquis.



He also suffers from chronic pedal edema, which has been treated with

Lasix.



Currently, his altered mental status is resolved.  He denies chest pain. 

Denies shortness of breath.



SOCIAL HISTORY:  The patient does not drink.



REVIEW OF SYSTEMS:  Fourteen-point review of systems is reviewed in detail.

No cardiac symptomatology is noted other than his chronic pedal edema.



PHYSICAL EXAMINATION:

VITAL SIGNS:  Blood pressure 140/95; the heart rate is in the 80s, atrial

fibrillation.

NECK:  Negative JVD.

LUNGS:  Without rales.

HEART:  Reveals S1, S2.

EXTREMITIES:  1+ edema.



EKG shows atrial fibrillation with nonspecific ST-T changes.



LABORATORY DATA:  Hemoglobin of 12.8.  Chemistries:  BUN and creatinine of

21 and 1.2.  The sodium was found to be 115 and finally up to 119 today

with IV fluids.  His troponins are negative.



IMPRESSION:

1.  Altered mental status secondary to #2.

2.  Hyponatremia.

3.  Chronic atrial fibrillation, treated successfully with beta blockers,

digoxin and Eliquis.

4.  Severe dilated cardiomyopathy.

5.  Pedal edema.

6.  Pulmonary hypertension.



PLAN:  Given these findings, the electrolyte abnormalities have been

corrected gently, but heading in the right direction.  His atrial

fibrillation in stable condition.  There is no evidence for CHF.





__________________________________________

Adam Farah MD

 



DD:  03/01/2018 14:28:57

DT:  03/01/2018 14:32:37

Job # [id number]

## 2018-03-01 NOTE — RAD
PROCEDURE:  CHEST RADIOGRAPH, 1 VIEW



HISTORY:

Altered mental status 



COMPARISON:

02/19/2018.



FINDINGS:



LUNGS:

The lungs are well inflated.  There is airspace disease in the right 

lower lobe.



PLEURA:

No pneumothorax or pleural fluid seen.



CARDIOVASCULAR:

There is persistent moderate cardiomegaly.



OSSEOUS STRUCTURES:

No significant abnormalities.



VISUALIZED UPPER ABDOMEN:

Normal.



OTHER FINDINGS:

None. 



IMPRESSION:

Airspace disease in the right lower lobe may represent atelectasis or 

pneumonia.



Moderate cardiomegaly.

## 2018-03-01 NOTE — ED PDOC
Physical Exam


Vital Signs











  Temp Pulse Resp BP Pulse Ox


 


 03/01/18 06:28   70  19  157/76 H  100


 


 03/01/18 04:26  98.2 F  60  18  152/98 H  100











Finger Stick Blood Glucose: 87





Medical Decision Making


ED Course and Treatment: 





03/01/18 07:00


Case signed out to me by Dr. Negron. Patient is a 77 year old male whose past 

medical history includes hypertension, atrial fibrillation, leukopenia, and 

osteoarthritis, and presents to the Emergency department from nursing home for 

intermittent episodes of altered mental status since one day ago. Labs showed 

low Sodium at 115 and currently pending ICU. 











03/01/18 07:30


Case discussed with Dr. Peter, who is aware of plan and agrees with admitting 

patient to ICU. 





03/01/18 08:26


Dr. Peter came to examine patient. Hypertonic saline was discontinued. 





- Critical Care


Critical Care Minutes: 30 minutes





- Lab Interpretations


Lab Results: 








 03/01/18 04:45 





 03/01/18 04:45 





 Lab Results





03/01/18 05:00: Urine Opiates Screen Negative, Urine Methadone Screen Negative, 

Ur Barbiturates Screen Negative, Ur Phencyclidine Scrn Negative, Ur 

Amphetamines Screen Negative, U Benzodiazepines Scrn Negative, U Oth Cocaine 

Metabols Negative, U Cannabinoids Screen Negative


03/01/18 05:00: Urine Color Yellow, Urine Appearance Sl cloudy, Urine pH 6.0, 

Ur Specific Gravity 1.010, Urine Protein Trace H, Urine Glucose (UA) Negative, 

Urine Ketones Negative, Urine Blood Moderate H, Urine Nitrate Negative, Urine 

Bilirubin Negative, Urine Urobilinogen 0.2, Ur Leukocyte Esterase Moderate H, 

Urine RBC 1 - 3, Urine WBC 1 - 3, Ur Epithelial Cells 0 - 2, Urine Bacteria Rare


03/01/18 04:45: Digoxin 0.5 L


03/01/18 04:45: Uric Acid 3.8


03/01/18 04:45: Alcohol, Quantitative < 10


03/01/18 04:45: Sodium 115 L*, Potassium 5.0, Chloride 81 L, Carbon Dioxide 22, 

Anion Gap 17, BUN 22 H, Creatinine 1.2, Est GFR (African Amer) > 60, Est GFR (

Non-Af Amer) 58, Random Glucose 85, Calcium 9.5, Phosphorus 3.9, Magnesium 1.8, 

Total Bilirubin 1.8 H,  H D, ALT 82 H, Alkaline Phosphatase 141 H, 

Lactate Dehydrogenase 860 H, Total Creatine Kinase 474 H, CK-MB (CK-2) 10.4 H, 

CK-MB (CK-2) % 2.2 L, Troponin I 0.03, Total Protein 7.3, Albumin 3.5, Globulin 

3.8, Albumin/Globulin Ratio 0.9 L


03/01/18 04:45: PT 18.5 H, INR 1.59 H, APTT 36.5


03/01/18 04:45: WBC 6.9  D, RBC 4.53, Hgb 12.8 L, Hct 36.3 L, MCV 80.1  D, MCH 

28.3, MCHC 35.3, RDW 14.5, Plt Count 191, MPV 9.8, Gran % 70.5 H, Lymph % (Auto

) 16.6 L, Mono % (Auto) 12.4 H, Eos % (Auto) 0.4 L, Baso % (Auto) 0.1, Gran # 

4.83, Lymph # (Auto) 1.1 L, Mono # (Auto) 0.9 H, Eos # (Auto) 0.0, Baso # (Auto

) 0.01











- RAD Interpretation


Radiology Orders: 








03/01/18 04:27


HEAD W/O CONTRAST [CT] Stat 





03/01/18 04:28


CHEST ONE VIEW [RAD] Stat 














- Medication Orders


Current Medication Orders: 








Allopurinol (Zyloprim)  300 mg PO DAILY [facility]


Apixaban (Eliquis)  2.5 mg PO BID [facility]


   PRN Reason: Protocol


Aspirin (Ecotrin)  81 mg PO DAILY [facility]


Atorvastatin Calcium (Lipitor)  40 mg PO DIN [facility]


Bisacodyl (Dulcolax)  10 mg RC DAILY PRN


   PRN Reason: Constipation


Digoxin (Digoxin)  0.125 mg PO 1400 [facility]


Diltiazem HCl (Cardizem Cd)  240 mg PO DAILY [facility]


Docusate Sodium (Colace)  100 mg PO TID [facility]


Sodium Chloride (Hypertonic Saline 3%)  100 mls @ 100 mls/hr IV ONCE ONE


   Stop: 03/01/18 07:59


   Last Admin: 03/01/18 07:04  Dose: 100 mls/hr





eMAR Start Stop


 Document     03/01/18 07:04  CNR  (Rec: 03/01/18 07:05  CNR  [facility] [id number])


     Intravenous Solution


      Start Date                                 03/01/18


      Start Time                                 07:05





Magnesium Oxide (Mag-Ox)  800 mg PO BID [facility]


Metoprolol Tartrate (Lopressor)  50 mg PO BID [facility]


Pantoprazole Sodium (Protonix Ec Tab)  20 mg PO ACB [facility]


   Last Admin: 03/01/18 07:37  Dose: 20 mg





Polyethylene Glycol (Miralax)  17 gm PO BID [facility]











- Scribe Statement


The provider has reviewed the documentation as recorded by the Darrian Zepeda





Provider Scribe Attestation:


All medical record entries made by the Starribe were at my direction and 

personally dictated by me. I have reviewed the chart and agree that the record 

accurately reflects my personal performance of the history, physical exam, 

medical decision making, and the department course for this patient. I have 

also personally directed, reviewed, and agree with the discharge instructions 

and disposition. 





Disposition/Present on Arrival





- Present on Arrival


Any Indicators Present on Arrival: No


History of DVT/PE: No


History of Uncontrolled Diabetes: No


Urinary Catheter: No


History of Decub. Ulcer: No


History Surgical Site Infection Following: None





- Disposition


Have Diagnosis and Disposition been Completed?: Yes


Diagnosis: 


 Hyponatremia, Altered mental status





Disposition: HOSPITALIZED


Disposition Time: 07:53


Patient Plan: ICU


Condition: IMPROVED

## 2018-03-01 NOTE — ED PDOC
Arrival/HPI





- General


Time Seen by Provider: 03/01/18 04:20


Historian: Patient, Nursing Home





- History of Present Illness


Narrative History of Present Illness (Text): 


03/01/18 04:24


Tom Green is a 77 year old male, whose past medical history includes 

hypertension, atrial fibrillation, leukopenia, and osteoarthritis, who present 

to the Emergency department transferred from nursing home for intermittent 

episodes of altered mental status since yesterday. Patient currently awake, 

alert, and oriented x 3. Patient denies any fever, chills, chest pain, 

shortness of breath, nausea, vomiting, diarrhea, urinary symptoms, back pain, 

neck pain, headache, dizziness, trauma/injury, suicidal/homicidal ideation or 

any other complaints.





Symptom Onset: Gradual


Symptom Course: Unchanged


Activities at Onset: Light


Context: Home (Nursing home)





Past Medical History





- Provider Review


Nursing Documentation Reviewed: Yes





- Infectious Disease


Hx of Infectious Diseases: None





- Tetanus Immunization


Tetanus Immunization: Unknown





- Past Medical History


Past Medical History: No Previous





- Cardiac


Hx Cardiac Disorders: Yes (Afib)


Hx Hypertension: Yes





- Pulmonary


Hx Respiratory Disorders: No





- Neurological


Hx Neurological Disorder: No





- HEENT


Hx HEENT Disorder: No





- Renal


Hx Renal Disorder: No





- Endocrine/Metabolic


Hx Endocrine Disorders: No





- Hematological/Oncological


Hx Blood Disorders: No





- Integumentary


Hx Dermatological Disorder: No





- Musculoskeletal/Rheumatological


Hx Musculoskeletal Disorders: Yes


Hx Unsteady Gait: Yes





- Gastrointestinal


Hx Gastrointestinal Disorders: No





- Genitourinary/Gynecological


Hx Genitourinary Disorders: No





- Psychiatric


Hx Depression: No


Hx Emotional Abuse: No


Hx Physical Abuse: No


Hx Substance Use: No





- Past Surgical History


Past Surgical History: No Previous





- Anesthesia


Hx Anesthesia: No





- Suicidal Assessment


Feels Threatened In Home Enviroment: No





Family/Social History





- Physician Review


Nursing Documentation Reviewed: Yes


Family/Social History: Unknown Family HX


Smoking Status: Never Smoked


Hx Alcohol Use: No


Hx Substance Use: No





Allergies/Home Meds


Allergies/Adverse Reactions: 


Allergies





No Known Allergies Allergy (Verified 01/28/14 15:34)


 








Home Medications: 


 Home Meds











 Medication  Instructions  Recorded  Confirmed


 


Bisacodyl [Dulcolax] 10 mg RC DAILY PRN 03/01/18 03/01/18


 


Digoxin 0.125 mg PO DAILY 03/01/18 03/01/18


 


Ondansetron [Zofran] 4 mg PO Q4 PRN 03/01/18 03/01/18


 


Pantoprazole [Protonix EC Tab] 20 mg PO DAILY 03/01/18 03/01/18














Review of Systems





- Physician Review


All systems were reviewed & negative as marked: Yes





- Review of Systems


Constitutional: Normal.  absent: Fevers


Eyes: Normal


ENT: Normal


Respiratory: Normal.  absent: SOB, Cough


Cardiovascular: Normal.  absent: Chest Pain


Gastrointestinal: Normal.  absent: Abdominal Pain, Diarrhea, Nausea, Vomiting


Genitourinary Male: Normal.  absent: Dysuria, Frequency, Hematuria, Urinary 

Output Changes


Musculoskeletal: Normal.  absent: Back Pain, Neck Pain


Skin: Other (+altered mental status).  absent: Rash


Neurological: Normal.  absent: Headache, Dizziness


Endocrine: Normal


Hemo/Lymphatic: Normal


Psychiatric: Normal





Physical Exam


Vital Signs Reviewed: Yes


Vital Signs











  Temp Pulse Resp BP Pulse Ox


 


 03/01/18 08:59   88  18  137/65  100


 


 03/01/18 08:01   98 H  17  142/90  97


 


 03/01/18 06:28   70  19  157/76 H  100


 


 03/01/18 04:26  98.2 F  60  18  152/98 H  100











Temperature: Afebrile


Blood Pressure: Normal


Pulse: Regular


Respiratory Rate: Normal


Appearance: Positive for: Well-Appearing, Non-Toxic, Comfortable


Pain Distress: None


Mental Status: Positive for: Alert and Oriented X 3





- Systems Exam


Head: Present: Atraumatic, Normocephalic


Pupils: Present: PERRL


Extroacular Muscles: Present: EOMI


Conjunctiva: Present: Normal


Mouth: Present: Moist Mucous Membranes


Neck: Present: Normal Range of Motion


Respiratory/Chest: Present: Clear to Auscultation, Good Air Exchange.  No: 

Respiratory Distress, Accessory Muscle Use


Cardiovascular: Present: Regular Rate and Rhythm, Normal S1, S2.  No: Murmurs


Abdomen: Present: Normal Bowel Sounds.  No: Tenderness, Distention, Peritoneal 

Signs


Back: Present: Normal Inspection


Upper Extremity: Present: Normal Inspection.  No: Cyanosis, Edema


Lower Extremity: Present: Normal Inspection.  No: Edema


Neurological: Present: GCS=15, CN II-XII Intact, Speech Normal


Skin: Present: Warm, Dry, Normal Color.  No: Rashes


Psychiatric: Present: Alert, Oriented x 3, Normal Insight, Normal Concentration





Medical Decision Making


ED Course and Treatment: 


03/01/18 04:24


Impression:


77 year old male sent from nursing home for intermittent episodes of altered 

mental status since yesterday.





Plan:


-- CT Head w/o contrast


-- EKG


-- Chest X-ray


-- Labs, cardiac enzymes, alcohol level, blood cultures


-- Urinalysis, urine cultures, urine drug screen


-- Reassess and disposition





Prior Visits:


Notes and results from previous visits were reviewed. 


On 02/08/2018, pt was seen in the Emergency department for bilateral lower 

extremity swelling with pain and dyspnea on exertion. Pt was admitted to the 

hospital for further evaluation.





Progress Notes:


03/01/18 04:58


Reviewed EKG, a fib at 74 bpm. PVC. Non-specific ST/T wave changes. Prolonged 

QT.





03/01/18 06:09


Chest X-ray reviewed, nad





case d/w dr cummings accepts case want icu case endorsed dr gregory pending icu 

evaluation


03/01/18 20:02








- Lab Interpretations


Lab Results: 








 03/01/18 04:45 





 03/01/18 04:45 





 Lab Results





03/01/18 05:00: Urine Opiates Screen Negative, Urine Methadone Screen Negative, 

Ur Barbiturates Screen Negative, Ur Phencyclidine Scrn Negative, Ur 

Amphetamines Screen Negative, U Benzodiazepines Scrn Negative, U Oth Cocaine 

Metabols Negative, U Cannabinoids Screen Negative


03/01/18 05:00: Urine Color Yellow, Urine Appearance Sl cloudy, Urine pH 6.0, 

Ur Specific Gravity 1.010, Urine Protein Trace H, Urine Glucose (UA) Negative, 

Urine Ketones Negative, Urine Blood Moderate H, Urine Nitrate Negative, Urine 

Bilirubin Negative, Urine Urobilinogen 0.2, Ur Leukocyte Esterase Moderate H, 

Urine RBC 1 - 3, Urine WBC 1 - 3, Ur Epithelial Cells 0 - 2, Urine Bacteria Rare


03/01/18 04:45: Digoxin 0.5 L


03/01/18 04:45: Hepatitis A IgM Ab Negative, Hep Bs Antigen Negative, Hep B 

Core IgM Ab Negative, Hepatitis C Antibody Negative


03/01/18 04:45: Prostate Specific Ag 54.1 H


03/01/18 04:45: Uric Acid 3.8


03/01/18 04:45: Alcohol, Quantitative < 10


03/01/18 04:45: Sodium 115 L*, Potassium 5.0, Chloride 81 L, Carbon Dioxide 22, 

Anion Gap 17, BUN 22 H, Creatinine 1.2, Est GFR (African Amer) > 60, Est GFR (

Non-Af Amer) 58, Random Glucose 85, Calcium 9.5, Phosphorus 3.9, Magnesium 1.8, 

Total Bilirubin 1.8 H,  H D, ALT 82 H, Alkaline Phosphatase 141 H, 

Lactate Dehydrogenase 860 H, Total Creatine Kinase 474 H, CK-MB (CK-2) 10.4 H, 

CK-MB (CK-2) % 2.2 L, Troponin I 0.03, Total Protein 7.3, Albumin 3.5, Globulin 

3.8, Albumin/Globulin Ratio 0.9 L


03/01/18 04:45: PT 18.5 H, INR 1.59 H, APTT 36.5


03/01/18 04:45: WBC 6.9  D, RBC 4.53, Hgb 12.8 L, Hct 36.3 L, MCV 80.1  D, MCH 

28.3, MCHC 35.3, RDW 14.5, Plt Count 191, MPV 9.8, Gran % 70.5 H, Lymph % (Auto

) 16.6 L, Mono % (Auto) 12.4 H, Eos % (Auto) 0.4 L, Baso % (Auto) 0.1, Gran # 

4.83, Lymph # (Auto) 1.1 L, Mono # (Auto) 0.9 H, Eos # (Auto) 0.0, Baso # (Auto

) 0.01








I have reviewed the lab results: Yes





- RAD Interpretation


Radiology Orders: 








03/01/18 04:27


HEAD W/O CONTRAST [CT] Stat 





03/01/18 04:28


CHEST ONE VIEW [RAD] Stat 











: ED Physician





- EKG Interpretation


Interpreted by ED Physician: Yes


Type: 12 lead EKG





- Medication Orders


Current Medication Orders: 








Allopurinol (Zyloprim)  300 mg PO DAILY [facility]


   Last Admin: 03/01/18 11:13  Dose: 300 mg





Apixaban (Eliquis)  2.5 mg PO BID [facility]


   PRN Reason: Protocol


   Last Admin: 03/01/18 17:18  Dose: 2.5 mg





Aspirin (Ecotrin)  81 mg PO DAILY [facility]


   Last Admin: 03/01/18 11:14  Dose: 81 mg





Atorvastatin Calcium (Lipitor)  40 mg PO DIN [facility]


   Last Admin: 03/01/18 17:18  Dose: 40 mg





Bicalutamide (Casodex)  50 mg PO DAILY [facility]


Bisacodyl (Dulcolax)  10 mg RC DAILY PRN


   PRN Reason: Constipation


Digoxin (Digoxin)  0.125 mg PO 1400 [facility]


   Last Admin: 03/01/18 15:20  Dose: 0.125 mg





MAR Apical Pulse Rate


 Document     03/01/18 15:20  MMA  (Rec: 03/01/18 15:22  [facility])


     Apical Pulse Rate


      Apical Pulse Rate (60-90 beats/min)        82





Diltiazem HCl (Cardizem Cd)  240 mg PO DAILY [facility]


   Last Admin: 03/01/18 11:13  Dose: 240 mg





MAR Pulse and Blood Pressure


 Document     03/01/18 11:13  MMA  (Rec: 03/01/18 11:13  [facility])


     Pulse


      Pulse Rate (60-90)                         91


     Blood Pressure


      Blood Pressure (100//90)             141/99





Docusate Sodium (Colace)  100 mg PO TID [facility]


   Last Admin: 03/01/18 17:17  Dose: 100 mg





Last Bowel Movement


 Document     03/01/18 17:17  MMA  (Rec: 03/01/18 17:18  [facility])


     Last Bowel Movement


      Last Bowel Movement                        02/27/18





Doxycycline Hyclate (Doryx)  100 mg PO Q12 [facility]


   PRN Reason: Protocol


   Last Admin: 03/01/18 15:17  Dose: 100 mg





Ceftriaxone Sodium (Rocephin 2 Gm Ivpb)  2 gm in 100 mls @ 100 mls/hr IVPB 

DAILY [facility]


   PRN Reason: Protocol


   Last Admin: 03/01/18 11:20  Dose: 100 mls/hr





eMAR Start Stop


 Document     03/01/18 11:20  MMA  (Rec: 03/01/18 11:20  [facility])


     Intravenous Solution


      Start Date                                 03/01/18


      Start Time                                 11:20


      End Date                                   03/01/18


      End time                                   12:25


      Total Infusion Time                        65





Levalbuterol HCl (Xopenex)  0.63 mg IH [id number] [facility]


   Last Admin: 03/01/18 10:56  Dose: 0.63 mg





Levalbuterol HCl (Xopenex)  0.63 mg IH Q2H PRN


   PRN Reason: Shortness of Breath


Magnesium Oxide (Mag-Ox)  800 mg PO BID [facility]


   Last Admin: 03/01/18 17:17  Dose: 800 mg





Metoprolol Tartrate (Lopressor)  50 mg PO BID [facility]


   Last Admin: 03/01/18 17:18  Dose: 50 mg





MAR Pulse and Blood Pressure


 Document     03/01/18 17:18  MMA  (Rec: 03/01/18 17:20  MMA  [facility])


     Pulse


      Pulse Rate (60-90)                         71


     Blood Pressure


      Blood Pressure (100//90)             118/67





Pantoprazole Sodium (Protonix Ec Tab)  20 mg PO ACBD [facility]


   Last Admin: 03/01/18 15:31  Dose: 20 mg





Polyethylene Glycol (Miralax)  17 gm PO BID [facility]


   Last Admin: 03/01/18 17:17  Dose: 17 gm





Discontinued Medications





Bicalutamide (Casodex)  50 mg PO DAILY [facility]


Sodium Chloride (Hypertonic Saline 3%)  100 mls @ 100 mls/hr IV ONCE ONE


   Stop: 03/01/18 07:59


   Last Admin: 03/01/18 07:04  Dose: 100 mls/hr





eMAR Start Stop


 Document     03/01/18 07:04  CNR  (Rec: 03/01/18 07:05  CNR  [id number]


     Intravenous Solution


      Start Date                                 03/01/18


      Start Time                                 07:05


      End Date                                   03/01/18


      End time                                   08:05


      Total Infusion Time                        60





Vancomycin HCl (Vancomycin 1gm)  1 gm in 250 mls @ 167 mls/hr IVPB [facility]


   PRN Reason: Protocol


   Stop: 03/01/18 11:30


   Last Admin: 03/01/18 11:20  Dose: 167 mls/hr





eMAR Start Stop


 Document     03/01/18 11:20  MMA  (Rec: 03/01/18 11:20  MMA  [facility]


     Intravenous Solution


      Start Date                                 03/01/18


      Start Time                                 12:25


      End Date                                   03/01/18


      End time                                   14:00


      Total Infusion Time                        95





Sodium Chloride (Hypertonic Saline 3%)  500 mls @ 20 mls/hr IV .Q24H [facility]


   Last Admin: 03/01/18 11:16  Dose: 20 mls/hr





eMAR Start Stop


 Document     03/01/18 11:16  MMA  (Rec: 03/01/18 11:17  MMA  [facility]


     Intravenous Solution


      Start Date                                 03/01/18


      Start Time                                 11:16


      End Date                                   03/02/18


      End time                                   11:16


      Total Infusion Time                        1440





Pantoprazole Sodium (Protonix Ec Tab)  20 mg PO ACB [facility]


   Last Admin: 03/01/18 07:37  Dose: 20 mg











- Darrian Statement


The provider has reviewed the documentation as recorded by the Starribelsa Cueva





All medical record entries made by the Darrian were at my direction and 

personally dictated by me. I have reviewed the chart and agree that the record 

accurately reflects my personal performance of the history, physical exam, 

medical decision making, and the department course for this patient. I have 

also personally directed, reviewed, and agree with the discharge instructions 

and disposition.





Disposition/Present on Arrival





- Present on Arrival


Any Indicators Present on Arrival: No


History of DVT/PE: No


History of Uncontrolled Diabetes: No


Urinary Catheter: No


History Surgical Site Infection Following: None





- Disposition


Have Diagnosis and Disposition been Completed?: Yes


Diagnosis: 


 Hyponatremia, Altered mental status





Disposition: HOSPITALIZED


Disposition Time: 07:00


Patient Problems: 


 Current Active Problems











Problem Status Onset


 


Altered mental status Acute  


 


Hyponatremia Acute  











Condition: FAIR

## 2018-03-01 NOTE — HP
HISTORY OF PRESENT ILLNESS:  The patient is transferred from [facility] because the patient was found to be confused with altered

mental status.  The patient was discharged to [facility] subacute

rehab.  According to the nurses and the triage notes and EMS and ambulance

evaluation, EMS ambulance was called because the patient was found to be

confused with altered mental status.  According to the nurses' notes,

according to the ER physician evaluation note, the patient presented with

intermittent episodes of altered mental status and confusion.  But when the

patient came to the emergency room, the patient was back to baseline.



LIVING WILL ADVANCE DIRECTIVE:  None.



HEIGHT:  5 feet 11.



WEIGHT:  210.



BMI 29.3.



MEDICATIONS:  Allopurinol 300 mg daily Eliquis 2.5 twice a day, Ecotrin 81

mg daily, Lipitor 40 mg daily, Dulcolax suppository 10 mg daily p.r.n.,

digoxin 0.125 daily, Cardizem  mg daily, Colace 100 mg three times a

day, Lasix 40 mg daily, magnesium oxide 800 mg twice a day, Lopressor 50 mg

twice a day, Zofran 4 mg q.  p.r.n., Protonix 20 mg once or twice a day,

MiraLax 17 g twice a day.



SOCIAL HISTORY:  Negative for smoking, alcohol, drug use, communicable or

transmissible disease, occupational history.  Occupational history is

disabled.



FAMILY HISTORY:  Not available.



PAST MEDICAL AND SURGICAL HISTORY:  History of recently diagnosed stage IV

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

systolic congestive heart failure, history of hyponatremia, history of

metastatic stage IV adenocarcinoma the prostate, history of hypertension,

history of atrial fibrillation, history of leukopenia, history of

hyperkalemia secondary to angiotensin receptor blocker and ACE-induced,

history of hypovitaminosis D, history of nonischemic dilated cardiomyopathy

with ejection fraction 20% and history of pulmonary arterial hypertension

with right ventricular systolic pressure of 52 mmHg, history of concentric

left ventricular hypertrophy, history of severely impaired left ventricle

systolic function with global left ventricular hypokinesis, history of

moderately dilated left and right atrium, history of decreased right

ventricular systolic function, history of moderately thickened aortic and

mitral valve, history of moderate mitral and tricuspid regurgitation,

history of hyper and hypovolemic hyponatremia secondary to congestive heart

failure and metastases adenocarcinoma of the prostate, history of syndrome

of inappropriate antidiuretic hormone, history of hypertensive chronic

kidney disease stage III, history of hypokalemia, hypomagnesemia, history

of constipation, history of hyperuricemia, history of metastatic

adenocarcinoma of the prostate with bone metastasis, status post lumbar 2

vertebral body biopsy, history of decompensated nonischemic dilated

cardiomyopathy and decompensated systolic congestive heart failure, history

of atrial fibrillation with rapid ventricle response, history of refractory

slow resolving hyponatremia, history of anemia, history of elevated PSA of

greater than 85, 90 and 97, history of transaminitis, history of

hyperbilirubinemia, history of diffuse osteopenia and osteoporosis, history

of questionable ileus, history of hypomagnesemia, history of status post

acute kidney injury with underlying chronic kidney disease stage 3, history

of cystoscopy and insertion of the 18-Irish two-way Fraire catheter with

evacuation of clots and fulguration and retrograde pyelogram, history of

abdominal distention, history of status post CAT scan guided lumber 2

vertebral body biopsy, history of hypervolemic hyponatremia secondary to

congestive heart failure, history of syndrome of inappropriate antidiuretic

hormone, history of dilated cardiomyopathy, history of bilateral lower

extremity lymphedema secondary to pulmonary arterial hypertension and

elevated right ventricular systolic pressure, history of constipation,

history of global left ventricular hypokinesis, history of tricuspid

regurgitation, mitral regurgitation, history of dilated left and right

atrium, history of diffuse osseous metastases, history of cerebral cortical

atrophy of the brain, history of centrilobular and paraseptal emphysema

with atelectasis, history of cardiomegaly, history of anasarca, history of

bilateral adrenal gland hypertrophy, history of right renal cyst, history

of colonic diverticulosis, history of avascular necrosis of the femoral

head, history of degenerative joint disease of the spine, history of

retroperitoneal lymphadenopathy, history of gait dysfunction, history of

deconditioning, history of urinary retention, history of stage IV prostate

carcinoma with bone metastasis, history of inferolateral coronary ischemia,

history of poor compliance, history of systolic congestive heart failure.



PHYSICAL EXAMINATION:

GENERAL:  The patient was seen in the ICU bed 4.  The patient is lying in

the bed.  The patient is comfortable.

VITAL SIGNS:  T-max 98.2.  Telemetry shows atrial fibrillation, heart rate

in 70s, 80s and 90s.  Blood pressure is 152/98, 152/76, 142/90, 137/65;

respirations 16-18, O2 sat % on nasal cannula.

HEENT: Head examination normocephalic, atraumatic.  HEENT examination shows

pink conjunctivae.  Anicteric sclerae.  No oropharyngeal lesion.  No

jugular venous distention.

CHEST:  Kyphosis.

LUNGS:  Shows positive rhonchi, creps right more than the left.

CARDIOVASCULAR:  Shows S1, S2, irregular rhythm.  Positive systolic murmur,

right second costal space, left second intercostal space, left sternal

border.

ABDOMEN:  Distended, protuberant, unable to appreciate any

hepatosplenomegaly.  No guarding.  No rigidity.  No rebound tenderness.

GENITALIA:  Male.  Rectal examination is deferred.

EXTREMITIES:  Show 2+ pitting edema of the right lower extremity 1+ pitting

edema of the left lower extremity.

MUSCULOSKELETAL:  Shows a body mass index of 29.3.  Neurologically, at

present, the patient is totally awake, alert, responsive, oriented to

person, place and time.

NEUROLOGIC:  The patient is alert, awake, responsive, oriented x3.

MUSCULOSKELETAL:  Examination shows a body mass index of 29.3.  Psychiatric

examination negative for anxiety, depression.  Negative for auditory or

visual hallucination.



DIAGNOSTICS:  CBC shows WBC of 6.9, hemoglobin and hematocrit 12.8 and

36.3, platelet 191.  Granulocytes 70% segs.  PT 18.5, INR 1.5.  Sodium 115,

potassium 5.0, chloride 81, CO2 of 22, anion gap 17, BUN 32, creatinine

1.2.  Repeat sodium is still low at 117, potassium 4.8, chloride 84, CO2 of

22, anion gap 17, BUN 21, calcium is 9.5, uric acid 3.8, magnesium 1.8,

total bili 1.8, , ALT 42, alk phos 141, .  Troponin 0.03. 

Urine pH 6.0, specific gravity 1.010, trace protein, moderate blood,

moderate leukocyte esterase.  Bacteria.  Urine drug screen negative. 

Digoxin 0.5.  -Alcohol level negative.  The patient had a chest x-ray and a

head CT done, which was reviewed.  EKG was also reviewed.



The patient was seen in the emergency room by Dr. Negron.  The patient was

started on the treatment in the emergency room.  The patient was started on

hypertonic saline by the ER physician and ER physician has requested.



IMPRESSION AND PLAN:

1.  Altered mental status with episodic confusion.

2.  Severe symptomatic hyponatremia with altered mental status and episodic

confusion.

3.  Hypochloremic hyponatremia.

4.  Mild prerenal kidney injury.

5.  Hyperbilirubinemia.

6.  Transaminitis.

7.  Questionable obstructive jaundice with hyperbilirubinemia.

8.  Questionable mild rhabdomyolysis with elevated CPK.

9.  Indeterminate troponin.

10.  Normocytic anemia.

11.  Granulocytosis.

12.  Mild coagulopathy.

14.  Proteinuria, microscopic hematuria, pyuria, bacteriuria.

15.  Atrial fibrillation with well controlled response with T-wave

inversion.

16.  Nonspecific ST changes with T-wave changes in lead I, II, aVL and

V3-V6.

17.  Healthcare-associated right lower lobe infiltrate pneumonia.

18.  Cardiomegaly.

19.  Healthcare-associated right lower lobe atelectasis, pneumonia and

airspace disease.

20.  Cerebral cortical atrophy of the brain.

21.  Right basal ganglia, chronic lacunar infarct.

22.  Intracranial arterial atherosclerosis.

23.  Deconditioning.

24.  History of stage IV metastatic prostate adenocarcinoma.

25.  History of nonischemic dilated cardiomyopathy with ejection fraction

of 20%.

26.  Moderate mitral and tricuspid regurgitation.

27.  Pulmonary arterial hypertension with elevated right ventricular

systolic pressure.

28.  Bilateral lower extremity lymphedema and venous stasis.

29.  Abdominal distention, etiology undetermined.

30.  History of ileus and constipation.

31.  Hyperuricemia.

32.  Eliquis-dependent atrial fibrillation.

33.  Dyslipidemia.

34.  Constipation.

35.  Hypomagnesemia.



Plan at this time, the patient is to be admitted to Intensive Care Unit. 

The patient has been ordered hepatitis panel, repeat basic metabolic panel

every 6 hours.  Repeat CMP daily.  Repeat CPK.  Repeat digoxin, LFTs,

magnesium.  Repeat CBC ordered.  Blood, sputum, urine cultures ordered.



CONSULTATION:

1.  Cardiology.

2.  Gastroenterology.

3.  Hematology/Oncology.

4.  Nephrology.



CURRENT MEDICATIONS:

1.  Cardizem  mg daily.

2.  Colace 100 mg three times a day.

3.  Digoxin 0.125 daily.

4.  Doxycycline 100 mg p.o. q. 12.

5.  Dulcolax 10 mg daily p.r.n.

6.  Ecotrin 81 mg daily.

7.  Eliquis 2.5 mg twice a day.

8.  The patient's hypertonic saline was started at 3% hypertonic saline at

10 mL an hour.

9.  Lipitor 40 mg daily.

10.  Lopressor 50 mg twice a day.

11.  Magnesium oxide 800 mg twice a day.

12.  MiraLax 17 g twice a day.

13.  Protonix 20 mg with breakfast, dinner.

14.  The patient is ordered broad-spectrum IV antibiotic with Rocephin 2 g

IV daily.

15.  The patient is on 3% hypertonic at 10 mL an hour.

16.  The patient was also given a dose of vancomycin 1 g stat.

17.  The patient is on Xopenex nebulizer 0.63 mg every 6 hours.

18.  Allopurinol 300 mg daily.



CAT scan of the chest, abdomen, pelvis has been ordered for evaluation of

pneumonia, prostate cancer, hyponatremia.  Chest PT ordered.  Repeat EKG

ordered.  Out of bed, SILVIANO stockings, SCDs ordered.  The patient's PT is

already elevated at 18.4 with an INR of 1.6 with the patient is also

started on DVT and GI prophylaxis at present.  I have also spoken to the

patient and the patient's son at length, Claude on phone number

[phone].  I have explained to the patient and the patient's son about

the patient's overall guarded to poor prognosis which they acknowledged,

understand.  I have also advised the patient's son to contact the social

worker for further management and further other issues which he

acknowledged, understand.



Dictated and electronically signed, not read.





__________________________________________

Dale Márquez MD





DD:  03/01/2018 10:32:16

DT:  03/01/2018 10:46:22

Job # [id number]

## 2018-03-02 LAB
ALBUMIN SERPL-MCNC: 3.4 G/DL (ref 3–4.8)
ALBUMIN/GLOB SERPL: 0.9 {RATIO} (ref 1.1–1.8)
ALT SERPL-CCNC: 72 U/L (ref 7–56)
AST SERPL-CCNC: 115 U/L (ref 17–59)
BASOPHILS # BLD AUTO: 0.01 K/MM3 (ref 0–2)
BASOPHILS NFR BLD: 0.2 % (ref 0–3)
BILIRUB DIRECT SERPL-MCNC: 1.2 MG/DL (ref 0–0.4)
BUN SERPL-MCNC: 18 MG/DL (ref 7–21)
CALCIUM SERPL-MCNC: 9.5 MG/DL (ref 8.4–10.5)
EOSINOPHIL # BLD: 0.1 10*3/UL (ref 0–0.7)
EOSINOPHIL NFR BLD: 0.9 % (ref 1.5–5)
ERYTHROCYTE [DISTWIDTH] IN BLOOD BY AUTOMATED COUNT: 14.6 % (ref 11.5–14.5)
GFR NON-AFRICAN AMERICAN: > 60
GRANULOCYTES # BLD: 4.33 10*3/UL (ref 1.4–6.5)
GRANULOCYTES NFR BLD: 68.4 % (ref 50–68)
HGB BLD-MCNC: 13.1 G/DL (ref 14–18)
LYMPHOCYTES # BLD: 1.1 10*3/UL (ref 1.2–3.4)
LYMPHOCYTES NFR BLD AUTO: 17.2 % (ref 22–35)
MCH RBC QN AUTO: 28.2 PG (ref 25–35)
MCHC RBC AUTO-ENTMCNC: 34.6 G/DL (ref 31–37)
MCV RBC AUTO: 81.5 FL (ref 80–105)
MONOCYTES # BLD AUTO: 0.8 10*3/UL (ref 0.1–0.6)
MONOCYTES NFR BLD: 13.3 % (ref 1–6)
PLATELET # BLD: 177 10^3/UL (ref 120–450)
PMV BLD AUTO: 9.6 FL (ref 7–11)
RBC # BLD AUTO: 4.65 10^6/UL (ref 3.5–6.1)
WBC # BLD AUTO: 6.3 10^3/UL (ref 4.5–11)

## 2018-03-02 RX ADMIN — LEVALBUTEROL SCH MG: 0.63 SOLUTION RESPIRATORY (INHALATION) at 13:32

## 2018-03-02 RX ADMIN — LEVALBUTEROL SCH MG: 0.63 SOLUTION RESPIRATORY (INHALATION) at 20:30

## 2018-03-02 RX ADMIN — POLYETHYLENE GLYCOL 3350 SCH GM: 17 POWDER, FOR SOLUTION ORAL at 10:56

## 2018-03-02 RX ADMIN — DIGOXIN SCH: 0.12 TABLET ORAL at 13:35

## 2018-03-02 RX ADMIN — Medication SCH MG: at 17:23

## 2018-03-02 RX ADMIN — CEFTRIAXONE SCH MLS/HR: 2 INJECTION, POWDER, FOR SOLUTION INTRAMUSCULAR; INTRAVENOUS at 10:57

## 2018-03-02 RX ADMIN — LEVALBUTEROL SCH MG: 0.63 SOLUTION RESPIRATORY (INHALATION) at 07:24

## 2018-03-02 RX ADMIN — Medication SCH MG: at 10:41

## 2018-03-02 RX ADMIN — PANTOPRAZOLE SODIUM SCH MG: 20 TABLET, DELAYED RELEASE ORAL at 08:26

## 2018-03-02 RX ADMIN — LEVALBUTEROL SCH MG: 0.63 SOLUTION RESPIRATORY (INHALATION) at 03:38

## 2018-03-02 RX ADMIN — PANTOPRAZOLE SODIUM SCH MG: 20 TABLET, DELAYED RELEASE ORAL at 17:24

## 2018-03-02 RX ADMIN — DILTIAZEM HYDROCHLORIDE SCH MG: 240 CAPSULE, COATED, EXTENDED RELEASE ORAL at 10:41

## 2018-03-02 NOTE — PN
DATE:



SUBJECTIVE:  This 77-year-old male patient.  The patient was admitted

through the emergency room.  Patient was transferred from nursing home. 

Patient was having altered mental state and confusion.  Patient has past

history of hypertension.  Patient has history of atherosclerotic heart

disease, chronic lung disease, hyperlipidemia, and atrial fibrillation. 

Patient was admitted to the ICU because the blood work done in the

emergency room showed that he was in severe hyponatremia and altered mental

state, which could have been related to the hyponatremia the patient manifested

with.  He is in the ICU.



PHYSICAL EXAMINATION:

VITAL SIGNS:  Today, pulse is 83, blood pressure 130/80.  Patient's

respirations are 16 per minute.

HEART:  Patient is in atrial fibrillation.

LUNGS:  Clear.

ABDOMEN:  Soft.  Liver and spleen is not palpable.

CENTRAL NERVOUS SYSTEM:  He is conscious.  He is rational now and he is

oriented in present time.



LABORATORY DATA:  Patient's blood work shows that his hemoglobin is 13.1. 

His chemistry, patient's BUN is 18, creatinine 1.1.  Patient's sodium is

120, that is today.  The patient still has hyponatremia.  Patient is under

the care of Dr. Mckee.  She is the nephrologist.  Patient's bilirubin is

1.9.



MEDICATIONS:  Patient's list of medications:  Patient takes Cardizem 240 mg

daily.  Patient is on Casodex for prostate carcinoma.  Patient is on

digoxin 125 mcg daily.  Patient is on doxycycline twice a day.  Patient is

on apixaban for atrial fibrillation, which is Eliquis.  Patient is on

Lipitor for hyperlipidemia, metoprolol 50 mg b.i.d. for hypertension.



ASSESSMENT AND PLAN:  Patient's condition seems to be clinically stable. 

Chemically, the patient is not stable, his sodium is still very low. 

Patient is getting antibiotics for infection.  Patient has cellulitis of

his legs and swelling of one leg, on the right side.  His overall prognosis

is guarded.  Condition is clinically stable.  We will follow up.





__________________________________________

Derian Mercer MD





DD:  03/02/2018 8:45:19

DT:  03/02/2018 9:30:15

Job # [id number]

[facility]LA[facility]

## 2018-03-02 NOTE — CP.PCM.PN
Subjective





- Date & Time of Evaluation


Date of Evaluation: 03/02/18


Time of Evaluation: 18:00





- Subjective


Subjective: 





Feeling better





Objective





- Vital Signs/Intake and Output


Vital Signs (last 24 hours): 


 











Temp Pulse Resp BP Pulse Ox


 


 97.2 F L  54 L  19   114/62   97 


 


 03/02/18 08:00  03/02/18 18:00  03/02/18 14:00  03/02/18 17:17  03/02/18 14:00








Intake and Output: 


 











 03/02/18 03/03/18





 18:59 06:59


 


Intake Total 450 


 


Output Total 1250 


 


Balance -800 














- Medications


Medications: 


 Current Medications





Allopurinol (Zyloprim)  300 mg PO DAILY [facility]


   Last Admin: 03/02/18 10:59 Dose:  300 mg


Apixaban (Eliquis)  2.5 mg PO BID [facility]


   PRN Reason: Protocol


   Last Admin: 03/02/18 19:25 Dose:  2.5 mg


Aspirin (Ecotrin)  81 mg PO DAILY [facility]


   Last Admin: 03/02/18 10:41 Dose:  81 mg


Atorvastatin Calcium (Lipitor)  40 mg PO DIN [facility]


   Last Admin: 03/02/18 17:16 Dose:  40 mg


Bicalutamide (Casodex)  50 mg PO DAILY [facility]


   Last Admin: 03/02/18 10:45 Dose:  50 mg


Bisacodyl (Dulcolax)  10 mg RC DAILY [facility]


   PRN Reason: Constipation


Digoxin (Digoxin)  0.125 mg PO 1400 [facility]


   Last Admin: 03/02/18 13:35 Dose:  Not Given


Diltiazem HCl (Cardizem Cd)  240 mg PO DAILY [facility]


   Last Admin: 03/02/18 10:41 Dose:  240 mg


Docusate Sodium (Colace)  100 mg PO TID [facility]


   Last Admin: 03/02/18 19:27 Dose:  100 mg


Doxycycline Hyclate (Doryx)  100 mg PO Q12 [facility]


   PRN Reason: Protocol


   Last Admin: 03/02/18 10:41 Dose:  100 mg


Furosemide (Lasix)  20 mg IVP Q12 [facility]


   Last Admin: 03/02/18 10:40 Dose:  20 mg


Ceftriaxone Sodium (Rocephin 2 Gm Ivpb)  2 gm in 100 mls @ 100 mls/hr IVPB 

DAILY [facility]


   PRN Reason: Protocol


   Stop: 03/05/18 10:59


   Last Admin: 03/02/18 10:57 Dose:  100 mls/hr


Levalbuterol HCl (Xopenex)  0.63 mg IH [id number] [facility]


   Last Admin: 03/02/18 20:30 Dose:  0.63 mg


Levalbuterol HCl (Xopenex)  0.63 mg IH Q2H [facility]


   PRN Reason: Shortness of Breath


Magnesium Oxide (Mag-Ox)  800 mg PO BID [facility]


   Last Admin: 03/02/18 17:23 Dose:  800 mg


Metoprolol Tartrate (Lopressor)  50 mg PO BID [facility]


   Last Admin: 03/02/18 17:17 Dose:  50 mg


Pantoprazole Sodium (Protonix Ec Tab)  20 mg PO ACBD [facility]


   Last Admin: 03/02/18 17:24 Dose:  20 mg


Polyethylene Glycol (Miralax)  17 gm PO BID [facility]


   Last Admin: 03/02/18 10:56 Dose:  17 gm











- Labs


Labs: 


 





 03/02/18 06:08 





 03/02/18 06:08 





 











PT  18.5 SECONDS (9.4-12.5)  H  03/01/18  04:45    


 


INR  1.59  (0.93-1.08)  H  03/01/18  04:45    


 


APTT  36.5 Seconds (25.1-36.5)   03/01/18  04:45    














- Head Exam


Head Exam: ATRAUMATIC





- Eye Exam


Eye Exam: Normal appearance





- ENT Exam


ENT Exam: Mucous Membranes Dry





- Respiratory Exam


Respiratory Exam: NORMAL BREATHING PATTERN





- Cardiovascular Exam


Cardiovascular Exam: +S1, +S2





- GI/Abdominal Exam


GI & Abdominal Exam: Normal Bowel Sounds





- Extremities Exam


Extremities Exam: Pedal Edema





Assessment and Plan


(1) Anemia


Assessment & Plan: 


chronic disease and bone metastasis


Status: Acute   





(2) Prostate cancer


Assessment & Plan: 


bone metastasis


started on Casodex 


outpatient Lupron


Status: Acute

## 2018-03-02 NOTE — CP.PCM.PN
Subjective





- Date & Time of Evaluation


Date of Evaluation: 03/02/18


Time of Evaluation: 15:04





- Subjective


Subjective: 





Nephrology Consultation Note





Assessment: stable


Hypervolemic hyponatremia likely Multifactorial due to CHF, SIADH Caused by 

metastatic prostate CA


Hypertensive Chronic Kidney Disease (I12.9)


Altered MENTAL status


Chronic Kidney Disease (N18.3) Stage 3 with Cr 1.2 


metastatic cancer, HTN (I12.9)


CHF LVEF 20%


hx of BPH


Vitamin D deficiency





Plan


No acute need for renal replacement therapy at this time. 


continue with oral fluid fluid restriction 1000 mL per day.  Avoid correction 

serum sodium more than 6-8 meq over 24 hours


started lasix 20 mg IV bid today and will consider Samsca 30 mg once serum Na 12

-125 range.





Check urine osm and urine sodium


Hypertension control with meds as ordered. unable to tolerate losartan due to 

hyperkalemia episodes.


Monitor Input/Output, daily weights and renal function with basic metabolic 

panel


supplement electrolytes as needed


Avoid nephrotoxins/NSAIDs


Glycemic control


Further work up for as per primary team





Thanks for allowing me to participate in care of your patient. Will follow 

patient with you. Please call if any Qs. d/w team. 


Dr Rickey Tapia


Office: [phone] Cell: [phone] Fax: [phone]





Chief complaint low salt 


Reason for consultation is Hyponatremia


HPI patient is 77-year-old male with history of chronic systolic CHF EF 20% 

also metastatic prostate cancer , BPH, recently seen for hyponatremia due to 

CHF and likely SIADH which was treated with samsca and and diuretic. Discharged 

to nursing home here with the acute change in mentaL status and low sodium of 

115 hence renal consult was requested. Patient at this time Feels better.He 

denies shortness of breath nausea vomiting any pain in his stomach.





Subjective/ROS: Noted events overnight. Patients feels okay. 


Denies chest pain, palpitation,improved shortness of breath, denies leg 

swelling. 


All other negative Except as mentioned in HPI. had bone biopsy by IR on 2/13/18 

Showed metastatic adeno carcinoma of prostate





Physical Examination:      


General Appearance: Comfortable, in no acute respiratory distress, co-operative 

. 


Vitals reviewed and noted as below


Head; Atraumatic, normocephalic


ENT: no ulcers no thrush. Tongue is midline. Oropharynx: no rash or ulcers.


EYES: Pupils are equal, round and reactive to light accommodation. Eye muscles 

and extraocular movement intact. Sclera is anicteric.


Neck; supple no lymphadenopathy, no thyromegaly or bruit


Lungs: Normal respiratory rate/effort. Breath sounds reduced at base


Heart: Normal rate. s1s2 normal. No rub or gallop. 


Extremities: RLE 1+ edema. No varicose veins


Neurological: Patient is alert, awake and oriented to person, place and time. 

No focal deficit. Strength bilateral appropriate and equal


Skin: Warm and dry. Normal turgor. No rash. Palpitation: Normal elasticity for 

age. Feets somewhat cool to touch


Abdomen: Abdomen is soft. Bowel sounds +. There is no abdominal tenderness, no 

guarding/rigidity no organomegaly


Psych: normal insight and normal affect/mood


MSK: no joint tenderness or swelling. Digits and nails normal, no deformity


: kidney or bladder not palpable. Has Fraire catheter in place





Labs/imaging reviewed.


Past medical history, past surgical history, family history, social history, 

allergy reviewed and noted as below


Family hx: no hx of CKD. Rest non-contributor





Objective





- Vital Signs/Intake and Output


Vital Signs (last 24 hours): 


 











Temp Pulse Resp BP Pulse Ox


 


 97.2 F L  61   19   151/95 H  97 


 


 03/02/18 08:00  03/02/18 14:00  03/02/18 14:00  03/02/18 11:00  03/02/18 14:00











- Medications


Medications: 


 Current Medications





Allopurinol (Zyloprim)  300 mg PO DAILY [facility]


   Last Admin: 03/02/18 10:59 Dose:  300 mg


Apixaban (Eliquis)  2.5 mg PO BID [facility]


   PRN Reason: Protocol


   Last Admin: 03/02/18 10:40 Dose:  2.5 mg


Aspirin (Ecotrin)  81 mg PO DAILY [facility]


   Last Admin: 03/02/18 10:41 Dose:  81 mg


Atorvastatin Calcium (Lipitor)  40 mg PO DIN [facility]


   Last Admin: 03/01/18 17:18 Dose:  40 mg


Bicalutamide (Casodex)  50 mg PO DAILY [facility]


   Last Admin: 03/02/18 10:45 Dose:  50 mg


Bisacodyl (Dulcolax)  10 mg RC DAILY [facility]


   PRN Reason: Constipation


Digoxin (Digoxin)  0.125 mg PO 1400 [facility]


   Last Admin: 03/01/18 15:20 Dose:  0.125 mg


Diltiazem HCl (Cardizem Cd)  240 mg PO DAILY [facility]


   Last Admin: 03/02/18 10:41 Dose:  240 mg


Docusate Sodium (Colace)  100 mg PO TID [facility]


   Last Admin: 03/02/18 10:42 Dose:  100 mg


Doxycycline Hyclate (Doryx)  100 mg PO Q12 [facility]


   PRN Reason: Protocol


   Last Admin: 03/02/18 10:41 Dose:  100 mg


Furosemide (Lasix)  20 mg IVP Q12 [facility]


   Last Admin: 03/02/18 10:40 Dose:  20 mg


Ceftriaxone Sodium (Rocephin 2 Gm Ivpb)  2 gm in 100 mls @ 100 mls/hr IVPB 

DAILY [facility]


   PRN Reason: Protocol


   Stop: 03/05/18 10:59


   Last Admin: 03/02/18 10:57 Dose:  100 mls/hr


Levalbuterol HCl (Xopenex)  0.63 mg IH [id number] [facility]


   Last Admin: 03/02/18 13:32 Dose:  0.63 mg


Levalbuterol HCl (Xopenex)  0.63 mg IH Q2H [facility]N


   PRN Reason: Shortness of Breath


Magnesium Oxide (Mag-Ox)  800 mg PO BID [facility]


   Last Admin: 03/02/18 10:41 Dose:  800 mg


Metoprolol Tartrate (Lopressor)  50 mg PO BID [facility]


   Last Admin: 03/02/18 10:41 Dose:  50 mg


Pantoprazole Sodium (Protonix Ec Tab)  20 mg PO ACBD [facility]


   Last Admin: 03/02/18 08:26 Dose:  20 mg


Polyethylene Glycol (Miralax)  17 gm PO BID [facility]


   Last Admin: 03/02/18 10:56 Dose:  17 gm











- Labs


Labs: 


 





 03/02/18 06:08 





 03/02/18 06:08 





 











PT  18.5 SECONDS (9.4-12.5)  H  03/01/18  04:45    


 


INR  1.59  (0.93-1.08)  H  03/01/18  04:45    


 


APTT  36.5 Seconds (25.1-36.5)   03/01/18  04:45

## 2018-03-02 NOTE — PN
DATE:  03/02/2018



CARDIOLOGY FOLLOWUP



SUBJECTIVE:  The patient is comfortable in a chair in the ICU.  His

confusion is much improved.



PHYSICAL EXAMINATION:

VITAL SIGNS:  Blood pressure 151/95, heart rates in the 70s.

NECK:  Negative JVD.

LUNGS:  Bilateral rhonchi.

HEART:  Reveals S1, S2.

EXTREMITIES:  Decreasing edema.



LABORATORY DATA:  Hemoglobin is 13.1.  Sodium is up to 120.



IMPRESSION:

1.  Altered mental status.

2.  Atrial fibrillation with good heart rate control.

3.  Severely dilated cardiomyopathy.

4.  Pulmonary hypertension.



PLAN:  Given these findings, the patient has been improving. 

Hemodynamically remained stable.  He can be transferred to telemetry and

continue monitoring his sodium.





__________________________________________

Adam Farah MD





DD:  03/02/2018 13:57:49

DT:  03/02/2018 14:01:40

Job # [id number]

## 2018-03-02 NOTE — CARD
--------------- APPROVED REPORT --------------





EKG Measurement

Heart [id number]

[id number]

[id number]

[id number]



<Conclusion>

Atrial fibrillation

Low voltage QRS

ST & T wave abnormality, consider anterolateral ischemia or digitalis 

effect

Prolonged QT

Abnormal ECG

## 2018-03-02 NOTE — PN
DATE:  03/02/2018



SUBJECTIVE:  The patient is lying in bed, comfortable.  He denies any

abdominal pain, shortness of breath, chest pain, nausea, vomiting.  He

tolerated solid foods.



MEDICATIONS:  Include Cardizem  mg once a day, Casodex 50 mg daily,

Colace 100 mg 3 times a day, digoxin 0.125 mg daily, Doryx 100 mg q. 12

hours p.o., Dulcolax suppository p.r.n., Ecotrin 81 mg daily, Eliquis 2.5

mg b.i.d., Lasix 20 mg IV q.12 hours, Lipitor 40 mg once a day, Lopressor

50 mg b.i.d., mag oxide 800 mg p.o. b.i.d., MiraLax 17 g p.o. b.i.d.,

Protonix 20 mg twice a day, Rocephin 2 g IV daily, Xopenex inhaler p.r.n.,

Zyloprim 300 mg daily.



PHYSICAL EXAMINATION

VITAL SIGNS:  Reveal temperature of 97.2, blood pressure 122/68, heart rate

of 66.

HEENT:  Reveals sclerae to be white.  Conjunctivae pink.

NECK:  Supple.

CHEST:  Reveal decreased breath sounds at the right base with some rhonchi.

HEART:  Reveals an irregularly irregular rate.

ABDOMEN:  Soft, nontender.

EXTREMITIES:  Show chronic stasis changes of his legs.  There is trace

pedal edema.



LABORATORY DATA:  Revealed white blood cell count 6.3, hemoglobin 13.1. 

Chemistries reveal sodium of 120, total bilirubin 1.9, , ALT 72,

alkaline phosphatase of 142.  CT scan of the abdomen and pelvis reveal no

abnormalities in the liver.  He does have sclerotic changes in L1

suggestive of metastatic disease.  CT of the chest reveal large right

pleural effusion with area of consolidation.



IMPRESSION:

1.  Hyponatremia with altered mental status.  Sodium is slowly trending

upward.

2.  Elevated liver enzymes, probably secondary to passive congestion of the

liver and this patient with atrial fibrillation, cardiomyopathy and

estimated ejection fraction of 20%.

3.  Pneumonia with right pleural effusion.

4.  Prostate cancer with bone metastasis.



RECOMMENDATIONS:

1.  Continue to follow serum sodium and liver enzymes.

2.  No further GI workup planned at this time.  His liver enzymes are

trending slowly towards normal.  His long-term prognosis is poor.



__________________________________________

Rob Gaspar MD



DD:  03/02/2018 10:52:44

DT:  03/02/2018 10:56:07

[facility] # [id number]

## 2018-03-02 NOTE — CP.CCUPN
<Chirstiana Alex - Last Filed: 03/02/18 09:49>





CCU Subjective





- Physician Review


Subjective (Free Text): 





03/02/18 09:51


Patient seen and examined at bedside. No acute events overnight as per nurse. 

Pt has no complaints at this time. Patient reports last bowel movement was 

yesterday; soft, nonbloody. Denies fever, headache, cp, abdominal pain, N&V, 

and diarrhea. Fraire in place, draining urine.


Admits to shortness of breath. 





Critical Care Time Spent (in minutes): 45





CCU Objective





- Vital Signs / Intake & Output


Vital Signs (Last 4 hours): 


Vital Signs











  Temp Pulse Resp Pulse Ox


 


 03/02/18 08:00  97.2 F L  83  21 


 


 03/02/18 07:58   75  16 


 


 03/02/18 07:57   74  16 


 


 03/02/18 07:56   74  15 


 


 03/02/18 07:55   78  17 


 


 03/02/18 07:54   80  18 


 


 03/02/18 07:53   70  16 


 


 03/02/18 07:52   77  17 


 


 03/02/18 07:51   79  20 


 


 03/02/18 07:50   79  19 


 


 03/02/18 07:49   71  18 


 


 03/02/18 07:48   80  19 


 


 03/02/18 07:47   77  17 


 


 03/02/18 07:46   72  16 


 


 03/02/18 07:17   66  15 


 


 03/02/18 07:16   66  14 


 


 03/02/18 07:15   66  14 


 


 03/02/18 07:14   81  18 


 


 03/02/18 07:13   73  17 


 


 03/02/18 07:00   74  15  97


 


 03/02/18 06:12   73  19 


 


 03/02/18 06:11   76  21 


 


 03/02/18 06:10   77  25 H 


 


 03/02/18 06:09   75  19 


 


 03/02/18 06:08   71  23 


 


 03/02/18 06:07   69  20 


 


 03/02/18 06:06   73  19 


 


 03/02/18 06:05   65  16 


 


 03/02/18 06:04   66  14 


 


 03/02/18 06:03   68  17 


 


 03/02/18 06:02   64  15 


 


 03/02/18 06:01   67  16 


 


 03/02/18 06:00   67  17 


 


 03/02/18 05:59   70  19 


 


 03/02/18 05:58   67  15 


 


 03/02/18 05:57   65  14 


 


 03/02/18 05:56   72  15 


 


 03/02/18 05:55   61  15 


 


 03/02/18 05:54   66  15 


 


 03/02/18 05:53   68  14 


 


 03/02/18 05:52   64  14 


 


 03/02/18 05:51   75  17 


 


 03/02/18 05:50   66  15 


 


 03/02/18 05:49   67  24 


 


 03/02/18 05:48   71  16 


 


 03/02/18 05:47   59 L  15 


 


 03/02/18 05:46   70  17 


 


 03/02/18 05:45   67  18 


 


 03/02/18 05:44   62  15 


 


 03/02/18 05:43   76  23 











Intake and Output (Last 8hrs): 


 Intake & Output











 03/01/18 03/02/18 03/02/18





 22:59 06:59 14:59


 


Intake Total 1300  


 


Output Total 1700  


 


Balance -400  


 


Intake:   


 


    


 


    Right Wrist 650  


 


  Oral 650  


 


Output:   


 


  Urine 1700  


 


    Urethral (Fraire) 1700  














- Physical Exam


Head: Positive for: Atraumatic, Normocephalic


Pupils: Positive for: PERRL


Extroacular Muscles: Positive for: EOMI


Conjunctiva: Positive for: Normal


Mouth: Positive for: Moist Mucous Membranes


Neck: Positive for: Normal Range of Motion


Respiratory/Chest: Positive for: Wheezes (thoughout), Decreased Breath Sounds (

kim, right worst than left. ).  Negative for: Clear to Auscultation, 

Respiratory Distress, Accessory Muscle Use, Rales, Retracting, Rhonchi, 

Tachypneic, Tender to Palpation


Cardiovascular: Positive for: Normal S1, S2, Irregular Rhythm.  Negative for: 

Regular Rate and Rhythm, Murmurs, Tachycardic, Bradycardic, Rub, Gallop


Abdomen: Positive for: Distention (edematous. ), Normal Bowel Sounds.  Negative 

for: Tenderness, Peritoneal Signs, Rebound


Back: Positive for: Normal Inspection


Upper Extremity: Positive for: Normal Inspection.  Negative for: Cyanosis, Edema


Lower Extremity: Positive for: Edema (worst on the right. )


Neurological: Positive for: GCS=15, CN II-XII Intact, Speech Normal


Skin: Positive for: Warm, Dry, Normal Color.  Negative for: Rashes


Psychiatric: Positive for: Alert, Oriented x 3, Normal Insight, Normal 

Concentration





- Medications


Active Medications: 


Active Medications











Generic Name Dose Route Start Last Admin





  Trade Name Freq  PRN Reason Stop Dose Admin


 


Allopurinol  300 mg  03/01/18 10:00  03/01/18 11:13





  Zyloprim  PO   300 mg





  DAILY MATTHIAS   Administration


 


Apixaban  2.5 mg  03/01/18 10:00  03/01/18 17:18





  Eliquis  PO   2.5 mg





  BID MATTHIAS   Administration





  Protocol   


 


Aspirin  81 mg  03/01/18 10:00  03/01/18 11:14





  Ecotrin  PO   81 mg





  DAILY MATTHIAS   Administration


 


Atorvastatin Calcium  40 mg  03/01/18 17:00  03/01/18 17:18





  Lipitor  PO   40 mg





  DIN MATTHIAS   Administration


 


Bicalutamide  50 mg  03/02/18 10:00  





  Casodex  PO   





  DAILY MATTHIAS   


 


Bisacodyl  10 mg  03/01/18 06:46  





  Dulcolax  RC   





  DAILY PRN   





  Constipation   


 


Digoxin  0.125 mg  03/01/18 14:00  03/01/18 15:20





  Digoxin  PO   0.125 mg





  1400 MATTHIAS   Administration


 


Diltiazem HCl  240 mg  03/01/18 10:00  03/01/18 11:13





  Cardizem Cd  PO   240 mg





  DAILY MATTHIAS   Administration


 


Docusate Sodium  100 mg  03/01/18 10:00  03/01/18 17:17





  Colace  PO   100 mg





  TID MATTHIAS   Administration


 


Doxycycline Hyclate  100 mg  03/01/18 10:00  03/01/18 21:15





  Doryx  PO   100 mg





  Q12 MATTHIAS   Administration





  Protocol   


 


Ceftriaxone Sodium  2 gm in 100 mls @ 100 mls/hr  03/01/18 10:00  03/01/18 11:20





  Rocephin 2 Gm Ivpb  IVPB  03/05/18 10:59  100 mls/hr





  DAILY MATTHIAS   Administration





  Protocol   


 


Levalbuterol HCl  0.63 mg  03/01/18 10:30  03/02/18 07:24





  Xopenex  IH   0.63 mg





  D2IORLS MATTHIAS   Administration


 


Levalbuterol HCl  0.63 mg  03/01/18 16:44  





  Xopenex  IH   





  Q2H PRN   





  Shortness of Breath   


 


Magnesium Oxide  800 mg  03/01/18 10:00  03/01/18 17:17





  Mag-Ox  PO   800 mg





  BID MATTHIAS   Administration


 


Metoprolol Tartrate  50 mg  03/01/18 10:00  03/01/18 17:18





  Lopressor  PO   50 mg





  BID MATTHIAS   Administration


 


Pantoprazole Sodium  20 mg  03/01/18 16:30  03/02/18 08:26





  Protonix Ec Tab  PO   20 mg





  ACBD MATTHIAS   Administration


 


Polyethylene Glycol  17 gm  03/01/18 10:00  03/01/18 17:17





  Miralax  PO   17 gm





  BID MATTHIAS   Administration














- Patient Studies


Lab Studies: 


 Lab Studies











  03/02/18 03/02/18 03/02/18 Range/Units





  06:08 06:08 06:08 


 


WBC    6.3  (4.5-11.0)  10^3/ul


 


RBC    4.65  (3.5-6.1)  10^6/uL


 


Hgb    13.1 L  (14.0-18.0)  g/dL


 


Hct    37.9 L  (42.0-52.0)  %


 


MCV    81.5  (80.0-105.0)  fl


 


MCH    28.2  (25.0-35.0)  pg


 


MCHC    34.6  (31.0-37.0)  g/dl


 


RDW    14.6 H  (11.5-14.5)  %


 


Plt Count    177  (120.0-450.0)  10^3/uL


 


MPV    9.6  (7.0-11.0)  fl


 


Gran %    68.4 H  (50.0-68.0)  %


 


Lymph % (Auto)    17.2 L  (22.0-35.0)  %


 


Mono % (Auto)    13.3 H  (1.0-6.0)  %


 


Eos % (Auto)    0.9 L  (1.5-5.0)  %


 


Baso % (Auto)    0.2  (0.0-3.0)  %


 


Gran #    4.33  (1.4-6.5)  


 


Lymph # (Auto)    1.1 L  (1.2-3.4)  


 


Mono # (Auto)    0.8 H  (0.1-0.6)  


 


Eos # (Auto)    0.1  (0.0-0.7)  


 


Baso # (Auto)    0.01  (0.0-2.0)  K/mm3


 


Sodium   120 L   (132-148)  mmol/L


 


Potassium   4.8   (3.6-5.0)  mmol/L


 


Chloride   86 L   ()  mmol/L


 


Carbon Dioxide   24   (21-33)  mmol/L


 


Anion Gap   14   (10-20)  


 


BUN   18   (7-21)  mg/dL


 


Creatinine   1.1   (0.8-1.5)  mg/dl


 


Est GFR (African Amer)   > 60   


 


Est GFR (Non-Af Amer)   > 60   


 


Random Glucose   92   ()  mg/dL


 


Calcium   9.5   (8.4-10.5)  mg/dL


 


Magnesium   2.0   (1.7-2.2)  mg/dL


 


Total Bilirubin   1.9 H   (0.2-1.3)  mg/dL


 


Direct Bilirubin   1.2 H   (0.0-0.4)  mg/dL


 


AST   115 H D   (17-59)  U/L


 


ALT   72 H   (7-56)  U/L


 


Alkaline Phosphatase   142 H   ()  U/L


 


Total Creatine Kinase     ()  U/L


 


CK-MB (CK-2)     (0.0-3.6)  ng/mL


 


CK-MB (CK-2) %     (2.5-3.0)  %


 


Troponin I     ng/mL


 


Total Protein   7.0   (5.8-8.3)  g/dL


 


Albumin   3.4   (3.0-4.8)  g/dL


 


Globulin   3.6   gm/dL


 


Albumin/Globulin Ratio   0.9 L   (1.1-1.8)  


 


Digoxin  0.7 L    (0.8-2.0)  ng/mL














  03/02/18 03/01/18 03/01/18 Range/Units





  00:30 20:55 17:56 


 


WBC     (4.5-11.0)  10^3/ul


 


RBC     (3.5-6.1)  10^6/uL


 


Hgb     (14.0-18.0)  g/dL


 


Hct     (42.0-52.0)  %


 


MCV     (80.0-105.0)  fl


 


MCH     (25.0-35.0)  pg


 


MCHC     (31.0-37.0)  g/dl


 


RDW     (11.5-14.5)  %


 


Plt Count     (120.0-450.0)  10^3/uL


 


MPV     (7.0-11.0)  fl


 


Gran %     (50.0-68.0)  %


 


Lymph % (Auto)     (22.0-35.0)  %


 


Mono % (Auto)     (1.0-6.0)  %


 


Eos % (Auto)     (1.5-5.0)  %


 


Baso % (Auto)     (0.0-3.0)  %


 


Gran #     (1.4-6.5)  


 


Lymph # (Auto)     (1.2-3.4)  


 


Mono # (Auto)     (0.1-0.6)  


 


Eos # (Auto)     (0.0-0.7)  


 


Baso # (Auto)     (0.0-2.0)  K/mm3


 


Sodium  121 L  120 L  120 L  (132-148)  mmol/L


 


Potassium     (3.6-5.0)  mmol/L


 


Chloride     ()  mmol/L


 


Carbon Dioxide     (21-33)  mmol/L


 


Anion Gap     (10-20)  


 


BUN     (7-21)  mg/dL


 


Creatinine     (0.8-1.5)  mg/dl


 


Est GFR (African Amer)     


 


Est GFR (Non-Af Amer)     


 


Random Glucose     ()  mg/dL


 


Calcium     (8.4-10.5)  mg/dL


 


Magnesium     (1.7-2.2)  mg/dL


 


Total Bilirubin     (0.2-1.3)  mg/dL


 


Direct Bilirubin     (0.0-0.4)  mg/dL


 


AST     (17-59)  U/L


 


ALT     (7-56)  U/L


 


Alkaline Phosphatase     ()  U/L


 


Total Creatine Kinase     ()  U/L


 


CK-MB (CK-2)     (0.0-3.6)  ng/mL


 


CK-MB (CK-2) %     (2.5-3.0)  %


 


Troponin I     ng/mL


 


Total Protein     (5.8-8.3)  g/dL


 


Albumin     (3.0-4.8)  g/dL


 


Globulin     gm/dL


 


Albumin/Globulin Ratio     (1.1-1.8)  


 


Digoxin     (0.8-2.0)  ng/mL














  03/01/18 03/01/18 03/01/18 Range/Units





  16:25 12:00 10:12 


 


WBC     (4.5-11.0)  10^3/ul


 


RBC     (3.5-6.1)  10^6/uL


 


Hgb     (14.0-18.0)  g/dL


 


Hct     (42.0-52.0)  %


 


MCV     (80.0-105.0)  fl


 


MCH     (25.0-35.0)  pg


 


MCHC     (31.0-37.0)  g/dl


 


RDW     (11.5-14.5)  %


 


Plt Count     (120.0-450.0)  10^3/uL


 


MPV     (7.0-11.0)  fl


 


Gran %     (50.0-68.0)  %


 


Lymph % (Auto)     (22.0-35.0)  %


 


Mono % (Auto)     (1.0-6.0)  %


 


Eos % (Auto)     (1.5-5.0)  %


 


Baso % (Auto)     (0.0-3.0)  %


 


Gran #     (1.4-6.5)  


 


Lymph # (Auto)     (1.2-3.4)  


 


Mono # (Auto)     (0.1-0.6)  


 


Eos # (Auto)     (0.0-0.7)  


 


Baso # (Auto)     (0.0-2.0)  K/mm3


 


Sodium  Cancelled  119 L*  Cancelled  (132-148)  mmol/L


 


Potassium  Cancelled   Cancelled  (3.6-5.0)  mmol/L


 


Chloride  Cancelled   Cancelled  ()  mmol/L


 


Carbon Dioxide  Cancelled   Cancelled  (21-33)  mmol/L


 


Anion Gap  Cancelled   Cancelled  (10-20)  


 


BUN  Cancelled   Cancelled  (7-21)  mg/dL


 


Creatinine     (0.8-1.5)  mg/dl


 


Est GFR ( Amer)  Cancelled   Cancelled  


 


Est GFR (Non-Af Amer)  Cancelled   Cancelled  


 


Random Glucose  Cancelled   Cancelled  ()  mg/dL


 


Calcium  Cancelled   Cancelled  (8.4-10.5)  mg/dL


 


Magnesium     (1.7-2.2)  mg/dL


 


Total Bilirubin     (0.2-1.3)  mg/dL


 


Direct Bilirubin     (0.0-0.4)  mg/dL


 


AST     (17-59)  U/L


 


ALT     (7-56)  U/L


 


Alkaline Phosphatase     ()  U/L


 


Total Creatine Kinase  436 H   493 H  ()  U/L


 


CK-MB (CK-2)  11.1 H   10.9 H  (0.0-3.6)  ng/mL


 


CK-MB (CK-2) %  2.5   2.2 L  (2.5-3.0)  %


 


Troponin I  0.02  D   0.03  ng/mL


 


Total Protein     (5.8-8.3)  g/dL


 


Albumin     (3.0-4.8)  g/dL


 


Globulin     gm/dL


 


Albumin/Globulin Ratio     (1.1-1.8)  


 


Digoxin     (0.8-2.0)  ng/mL














  03/01/18 Range/Units





  09:20 


 


WBC   (4.5-11.0)  10^3/ul


 


RBC   (3.5-6.1)  10^6/uL


 


Hgb   (14.0-18.0)  g/dL


 


Hct   (42.0-52.0)  %


 


MCV   (80.0-105.0)  fl


 


MCH   (25.0-35.0)  pg


 


MCHC   (31.0-37.0)  g/dl


 


RDW   (11.5-14.5)  %


 


Plt Count   (120.0-450.0)  10^3/uL


 


MPV   (7.0-11.0)  fl


 


Gran %   (50.0-68.0)  %


 


Lymph % (Auto)   (22.0-35.0)  %


 


Mono % (Auto)   (1.0-6.0)  %


 


Eos % (Auto)   (1.5-5.0)  %


 


Baso % (Auto)   (0.0-3.0)  %


 


Gran #   (1.4-6.5)  


 


Lymph # (Auto)   (1.2-3.4)  


 


Mono # (Auto)   (0.1-0.6)  


 


Eos # (Auto)   (0.0-0.7)  


 


Baso # (Auto)   (0.0-2.0)  K/mm3


 


Sodium  117 L*  (132-148)  mmol/L


 


Potassium  4.8  (3.6-5.0)  mmol/L


 


Chloride  84 L  ()  mmol/L


 


Carbon Dioxide  22  (21-33)  mmol/L


 


Anion Gap  17  (10-20)  


 


BUN  21  (7-21)  mg/dL


 


Creatinine  1.2  (0.8-1.5)  mg/dl


 


Est GFR (African Amer)  > 60  


 


Est GFR (Non-Af Amer)  58  


 


Random Glucose  83  ()  mg/dL


 


Calcium  9.2  (8.4-10.5)  mg/dL


 


Magnesium   (1.7-2.2)  mg/dL


 


Total Bilirubin   (0.2-1.3)  mg/dL


 


Direct Bilirubin   (0.0-0.4)  mg/dL


 


AST   (17-59)  U/L


 


ALT   (7-56)  U/L


 


Alkaline Phosphatase   ()  U/L


 


Total Creatine Kinase   ()  U/L


 


CK-MB (CK-2)   (0.0-3.6)  ng/mL


 


CK-MB (CK-2) %   (2.5-3.0)  %


 


Troponin I   ng/mL


 


Total Protein   (5.8-8.3)  g/dL


 


Albumin   (3.0-4.8)  g/dL


 


Globulin   gm/dL


 


Albumin/Globulin Ratio   (1.1-1.8)  


 


Digoxin   (0.8-2.0)  ng/mL








 Laboratory Results - last 24 hr











  03/01/18 03/01/18 03/01/18





  09:20 10:12 12:00


 


WBC   


 


RBC   


 


Hgb   


 


Hct   


 


MCV   


 


MCH   


 


MCHC   


 


RDW   


 


Plt Count   


 


MPV   


 


Gran %   


 


Lymph % (Auto)   


 


Mono % (Auto)   


 


Eos % (Auto)   


 


Baso % (Auto)   


 


Gran #   


 


Lymph # (Auto)   


 


Mono # (Auto)   


 


Eos # (Auto)   


 


Baso # (Auto)   


 


Sodium  117 L*  Cancelled  119 L*


 


Potassium  4.8  Cancelled 


 


Chloride  84 L  Cancelled 


 


Carbon Dioxide  22  Cancelled 


 


Anion Gap  17  Cancelled 


 


BUN  21  Cancelled 


 


Creatinine  1.2  


 


Est GFR ( Amer)  > 60  Cancelled 


 


Est GFR (Non-Af Amer)  58  Cancelled 


 


Random Glucose  83  Cancelled 


 


Calcium  9.2  Cancelled 


 


Magnesium   


 


Total Bilirubin   


 


Direct Bilirubin   


 


AST   


 


ALT   


 


Alkaline Phosphatase   


 


Total Creatine Kinase   493 H 


 


CK-MB (CK-2)   10.9 H 


 


CK-MB (CK-2) %   2.2 L 


 


Troponin I   0.03 


 


Total Protein   


 


Albumin   


 


Globulin   


 


Albumin/Globulin Ratio   


 


Digoxin   














  03/01/18 03/01/18 03/01/18





  16:25 17:56 20:55


 


WBC   


 


RBC   


 


Hgb   


 


Hct   


 


MCV   


 


MCH   


 


MCHC   


 


RDW   


 


Plt Count   


 


MPV   


 


Gran %   


 


Lymph % (Auto)   


 


Mono % (Auto)   


 


Eos % (Auto)   


 


Baso % (Auto)   


 


Gran #   


 


Lymph # (Auto)   


 


Mono # (Auto)   


 


Eos # (Auto)   


 


Baso # (Auto)   


 


Sodium  Cancelled  120 L  120 L


 


Potassium  Cancelled  


 


Chloride  Cancelled  


 


Carbon Dioxide  Cancelled  


 


Anion Gap  Cancelled  


 


BUN  Cancelled  


 


Creatinine   


 


Est GFR ( Amer)  Cancelled  


 


Est GFR (Non-Af Amer)  Cancelled  


 


Random Glucose  Cancelled  


 


Calcium  Cancelled  


 


Magnesium   


 


Total Bilirubin   


 


Direct Bilirubin   


 


AST   


 


ALT   


 


Alkaline Phosphatase   


 


Total Creatine Kinase  436 H  


 


CK-MB (CK-2)  11.1 H  


 


CK-MB (CK-2) %  2.5  


 


Troponin I  0.02  D  


 


Total Protein   


 


Albumin   


 


Globulin   


 


Albumin/Globulin Ratio   


 


Digoxin   














  03/02/18 03/02/18 03/02/18





  00:30 06:08 06:08


 


WBC   6.3 


 


RBC   4.65 


 


Hgb   13.1 L 


 


Hct   37.9 L 


 


MCV   81.5 


 


MCH   28.2 


 


MCHC   34.6 


 


RDW   14.6 H 


 


Plt Count   177 


 


MPV   9.6 


 


Gran %   68.4 H 


 


Lymph % (Auto)   17.2 L 


 


Mono % (Auto)   13.3 H 


 


Eos % (Auto)   0.9 L 


 


Baso % (Auto)   0.2 


 


Gran #   4.33 


 


Lymph # (Auto)   1.1 L 


 


Mono # (Auto)   0.8 H 


 


Eos # (Auto)   0.1 


 


Baso # (Auto)   0.01 


 


Sodium  121 L   120 L


 


Potassium    4.8


 


Chloride    86 L


 


Carbon Dioxide    24


 


Anion Gap    14


 


BUN    18


 


Creatinine    1.1


 


Est GFR ( Amer)    > 60


 


Est GFR (Non-Af Amer)    > 60


 


Random Glucose    92


 


Calcium    9.5


 


Magnesium    2.0


 


Total Bilirubin    1.9 H


 


Direct Bilirubin    1.2 H


 


AST    115 H D


 


ALT    72 H


 


Alkaline Phosphatase    142 H


 


Total Creatine Kinase   


 


CK-MB (CK-2)   


 


CK-MB (CK-2) %   


 


Troponin I   


 


Total Protein    7.0


 


Albumin    3.4


 


Globulin    3.6


 


Albumin/Globulin Ratio    0.9 L


 


Digoxin   














  03/02/18





  06:08


 


WBC 


 


RBC 


 


Hgb 


 


Hct 


 


MCV 


 


MCH 


 


MCHC 


 


RDW 


 


Plt Count 


 


MPV 


 


Gran % 


 


Lymph % (Auto) 


 


Mono % (Auto) 


 


Eos % (Auto) 


 


Baso % (Auto) 


 


Gran # 


 


Lymph # (Auto) 


 


Mono # (Auto) 


 


Eos # (Auto) 


 


Baso # (Auto) 


 


Sodium 


 


Potassium 


 


Chloride 


 


Carbon Dioxide 


 


Anion Gap 


 


BUN 


 


Creatinine 


 


Est GFR ( Amer) 


 


Est GFR (Non-Af Amer) 


 


Random Glucose 


 


Calcium 


 


Magnesium 


 


Total Bilirubin 


 


Direct Bilirubin 


 


AST 


 


ALT 


 


Alkaline Phosphatase 


 


Total Creatine Kinase 


 


CK-MB (CK-2) 


 


CK-MB (CK-2) % 


 


Troponin I 


 


Total Protein 


 


Albumin 


 


Globulin 


 


Albumin/Globulin Ratio 


 


Digoxin  0.7 L











Fingerstick Blood Sugar Results: 87


Results Reviewed to Date: Yes





Critical Care Progress Note





- Nutrition


Nutrition: 


 Nutrition











 Category Date Time Status


 


 Heart Healthy Diet [DIET] Diets  03/01/18 Lunch Ordered














Assessment/Plan





- Assessment and Plan (Free Text)


Assessment: 


Patient is a 76 y/o  with PMHx metastatic prostate cancer, afib on eliquis 

admitted to icu with severe hyponatremia. 


Plan: 


Neuro: AMS resolved


- likely due to severe hyponatremia, pt AAOx3


- continue to monitor changes in mental status





Cardio:


- Pt is hemodynamically stable, maintain MAP above 65. 


- h/o AFib- currently rate controlled, with Lopressor, continue with eliquis. 


- Cardiology following, patient has h/o severe systolic chf with lvef of 20.9%, 

on digoxin, diltiazem


- on ASA and Lipitor. 








Respiratory: Pneumonia with moderate right sided pleural effusion superimposed 

with chf exacerbation. 


   - on chest x-ray and ct chest. 


   - on abx, continue bronchodilators prn and standing dose.


   - Not on lasix at this time likely due to severe hyponatremia. currently not 

in acute respiratory distress, saturating well on nasal cannula. 


   - Will monitor and consider lasix prn. 


   - Head of bed above 35 degrees. 





GI: Transaminitis


- likely due to portal vein congestion due to CHF versus allopurinol. 


-hepatitis panel negative


- currently trending down, will consider RUQ abdominal US if it worsens. 


- Gi following


- On Protonix ppx





ID: Possible right lower lobe HAP


- BCx2 prelim currently negative, no leukocytosis, afebrile. 


- On rocephin and doxy.  


- Procal pending. 








Renal: Severe hyponatremia


-likely secondary to SIADH, secondary to metastatic prostate CA, r/o other 

causes. 


- Urine lytes pending. 


- Na+ is currently 120; BMP Q6H as per nephrologist. 


- limiting water intake and resuming low salt diet, as per Nephro


- Continue to monitor electrolytes, replete as necessary


- strict I&Os. 








Heme/Onc:


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


- Continue androgen blocker as per Dr. Wolf





Endo: 


-Maintain euglycemia





DVT ppx: on eliquis for afib. 








Patient seen, examined and case discussed with the intensivist. 





- Date & Time


Date: 03/02/18


Time: 10:20





<Dagoberto Velasco - Last Filed: 03/02/18 10:16>





CCU Objective





- Vital Signs / Intake & Output


Vital Signs (Last 4 hours): 


Vital Signs











  Temp Pulse Resp Pulse Ox


 


 03/02/18 08:00  97.2 F L  83  21 


 


 03/02/18 07:58   75  16 


 


 03/02/18 07:57   74  16 


 


 03/02/18 07:56   74  15 


 


 03/02/18 07:55   78  17 


 


 03/02/18 07:54   80  18 


 


 03/02/18 07:53   70  16 


 


 03/02/18 07:52   77  17 


 


 03/02/18 07:51   79  20 


 


 03/02/18 07:50   79  19 


 


 03/02/18 07:49   71  18 


 


 03/02/18 07:48   80  19 


 


 03/02/18 07:47   77  17 


 


 03/02/18 07:46   72  16 


 


 03/02/18 07:17   66  15 


 


 03/02/18 07:16   66  14 


 


 03/02/18 07:15   66  14 


 


 03/02/18 07:14   81  18 


 


 03/02/18 07:13   73  17 


 


 03/02/18 07:00   74  15  97











Intake and Output (Last 8hrs): 


 Intake & Output











 03/01/18 03/02/18 03/02/18





 22:59 06:59 14:59


 


Intake Total 1300  


 


Output Total 1700  


 


Balance -400  


 


Intake:   


 


    


 


    Right Wrist 650  


 


  Oral 650  


 


Output:   


 


  Urine 1700  


 


    Urethral (Fraire) 1700  














- Medications


Active Medications: 


Active Medications











Generic Name Dose Route Start Last Admin





  Trade Name Freq  PRN Reason Stop Dose Admin


 


Allopurinol  300 mg  03/01/18 10:00  03/01/18 11:13





  Zyloprim  PO   300 mg





  DAILY [facility]   Administration


 


Apixaban  2.5 mg  03/01/18 10:00  03/01/18 17:18





  Eliquis  PO   2.5 mg





  BID [facility]   Administration





  Protocol   


 


Aspirin  81 mg  03/01/18 10:00  03/01/18 11:14





  Ecotrin  PO   81 mg





  DAILY [facility]   Administration


 


Atorvastatin Calcium  40 mg  03/01/18 17:00  03/01/18 17:18





  Lipitor  PO   40 mg





  DIN [facility]   Administration


 


Bicalutamide  50 mg  03/02/18 10:00  





  Casodex  PO   





  DAILY [facility]   


 


Bisacodyl  10 mg  03/01/18 06:46  





  Dulcolax  RC   





  DAILY PRN   





  Constipation   


 


Digoxin  0.125 mg  03/01/18 14:00  03/01/18 15:20





  Digoxin  PO   0.125 mg





  1400 [facility]   Administration


 


Diltiazem HCl  240 mg  03/01/18 10:00  03/01/18 11:13





  Cardizem Cd  PO   240 mg





  DAILY [facility]   Administration


 


Docusate Sodium  100 mg  03/01/18 10:00  03/01/18 17:17





  Colace  PO   100 mg





  TID [facility]   Administration


 


Doxycycline Hyclate  100 mg  03/01/18 10:00  03/01/18 21:15





  Doryx  PO   100 mg





  Q12 [facility]   Administration





  Protocol   


 


Furosemide  20 mg  03/02/18 10:15  





  Lasix  IVP   





  Q12 MATTHIAS   


 


Ceftriaxone Sodium  2 gm in 100 mls @ 100 mls/hr  03/01/18 10:00  03/01/18 11:20





  Rocephin 2 Gm Ivpb  IVPB  03/05/18 10:59  100 mls/hr





  DAILY [facility]   Administration





  Protocol   


 


Levalbuterol HCl  0.63 mg  03/01/18 10:30  03/02/18 07:24





  Xopenex  IH   0.63 mg





  [id number] [facility]   Administration


 


Levalbuterol HCl  0.63 mg  03/01/18 16:44  





  Xopenex  IH   





  Q2H PRN   





  Shortness of Breath   


 


Magnesium Oxide  800 mg  03/01/18 10:00  03/01/18 17:17





  Mag-Ox  PO   800 mg





  BID [facility]   Administration


 


Metoprolol Tartrate  50 mg  03/01/18 10:00  03/01/18 17:18





  Lopressor  PO   50 mg





  BID [facility]   Administration


 


Pantoprazole Sodium  20 mg  03/01/18 16:30  03/02/18 08:26





  Protonix Ec Tab  PO   20 mg





  ACBD [facility]   Administration


 


Polyethylene Glycol  17 gm  03/01/18 10:00  03/01/18 17:17





  Miralax  PO   17 gm





  BID [facility]   Administration














- Patient Studies


Lab Studies: 


 Lab Studies











  03/02/18 03/02/18 03/02/18 Range/Units





  06:08 06:08 06:08 


 


WBC    6.3  (4.5-11.0)  10^3/ul


 


RBC    4.65  (3.5-6.1)  10^6/uL


 


Hgb    13.1 L  (14.0-18.0)  g/dL


 


Hct    37.9 L  (42.0-52.0)  %


 


MCV    81.5  (80.0-105.0)  fl


 


MCH    28.2  (25.0-35.0)  pg


 


MCHC    34.6  (31.0-37.0)  g/dl


 


RDW    14.6 H  (11.5-14.5)  %


 


Plt Count    177  (120.0-450.0)  10^3/uL


 


MPV    9.6  (7.0-11.0)  fl


 


Gran %    68.4 H  (50.0-68.0)  %


 


Lymph % (Auto)    17.2 L  (22.0-35.0)  %


 


Mono % (Auto)    13.3 H  (1.0-6.0)  %


 


Eos % (Auto)    0.9 L  (1.5-5.0)  %


 


Baso % (Auto)    0.2  (0.0-3.0)  %


 


Gran #    4.33  (1.4-6.5)  


 


Lymph # (Auto)    1.1 L  (1.2-3.4)  


 


Mono # (Auto)    0.8 H  (0.1-0.6)  


 


Eos # (Auto)    0.1  (0.0-0.7)  


 


Baso # (Auto)    0.01  (0.0-2.0)  K/mm3


 


Sodium   120 L   


 


Potassium   4.8   


 


Chloride   86 L   


 


Carbon Dioxide   24   


 


Anion Gap   14   


 


BUN   18   


 


Creatinine   1.1   (0.8-1.5)  mg/dl


 


Est GFR (African Amer)   > 60   


 


Est GFR (Non-Af Amer)   > 60   


 


Random Glucose   92   


 


Calcium   9.5   


 


Magnesium   2.0   (1.7-2.2)  mg/dL


 


Total Bilirubin   1.9 H   (0.2-1.3)  mg/dL


 


Direct Bilirubin   1.2 H   (0.0-0.4)  mg/dL


 


AST   115 H D   (17-59)  U/L


 


ALT   72 H   (7-56)  U/L


 


Alkaline Phosphatase   142 H   ()  U/L


 


Total Creatine Kinase     ()  U/L


 


CK-MB (CK-2)     (0.0-3.6)  ng/mL


 


CK-MB (CK-2) %     (2.5-3.0)  %


 


Troponin I     ng/mL


 


Total Protein   7.0   (5.8-8.3)  g/dL


 


Albumin   3.4   (3.0-4.8)  g/dL


 


Globulin   3.6   gm/dL


 


Albumin/Globulin Ratio   0.9 L   (1.1-1.8)  


 


Digoxin  0.7 L    (0.8-2.0)  ng/mL














  03/02/18 03/01/18 03/01/18 Range/Units





  00:30 20:55 17:56 


 


WBC     (4.5-11.0)  10^3/ul


 


RBC     (3.5-6.1)  10^6/uL


 


Hgb     (14.0-18.0)  g/dL


 


Hct     (42.0-52.0)  %


 


MCV     (80.0-105.0)  fl


 


MCH     (25.0-35.0)  pg


 


MCHC     (31.0-37.0)  g/dl


 


RDW     (11.5-14.5)  %


 


Plt Count     (120.0-450.0)  10^3/uL


 


MPV     (7.0-11.0)  fl


 


Gran %     (50.0-68.0)  %


 


Lymph % (Auto)     (22.0-35.0)  %


 


Mono % (Auto)     (1.0-6.0)  %


 


Eos % (Auto)     (1.5-5.0)  %


 


Baso % (Auto)     (0.0-3.0)  %


 


Gran #     (1.4-6.5)  


 


Lymph # (Auto)     (1.2-3.4)  


 


Mono # (Auto)     (0.1-0.6)  


 


Eos # (Auto)     (0.0-0.7)  


 


Baso # (Auto)     (0.0-2.0)  K/mm3


 


Sodium  121 L  120 L  120 L  


 


Potassium     


 


Chloride     


 


Carbon Dioxide     


 


Anion Gap     


 


BUN     


 


Creatinine     (0.8-1.5)  mg/dl


 


Est GFR (African Amer)     


 


Est GFR (Non-Af Amer)     


 


Random Glucose     


 


Calcium     


 


Magnesium     (1.7-2.2)  mg/dL


 


Total Bilirubin     (0.2-1.3)  mg/dL


 


Direct Bilirubin     (0.0-0.4)  mg/dL


 


AST     (17-59)  U/L


 


ALT     (7-56)  U/L


 


Alkaline Phosphatase     ()  U/L


 


Total Creatine Kinase     ()  U/L


 


CK-MB (CK-2)     (0.0-3.6)  ng/mL


 


CK-MB (CK-2) %     (2.5-3.0)  %


 


Troponin I     ng/mL


 


Total Protein     (5.8-8.3)  g/dL


 


Albumin     (3.0-4.8)  g/dL


 


Globulin     gm/dL


 


Albumin/Globulin Ratio     (1.1-1.8)  


 


Digoxin     (0.8-2.0)  ng/mL














  03/01/18 03/01/18 03/01/18 Range/Units





  16:25 12:00 10:12 


 


WBC     (4.5-11.0)  10^3/ul


 


RBC     (3.5-6.1)  10^6/uL


 


Hgb     (14.0-18.0)  g/dL


 


Hct     (42.0-52.0)  %


 


MCV     (80.0-105.0)  fl


 


MCH     (25.0-35.0)  pg


 


MCHC     (31.0-37.0)  g/dl


 


RDW     (11.5-14.5)  %


 


Plt Count     (120.0-450.0)  10^3/uL


 


MPV     (7.0-11.0)  fl


 


Gran %     (50.0-68.0)  %


 


Lymph % (Auto)     (22.0-35.0)  %


 


Mono % (Auto)     (1.0-6.0)  %


 


Eos % (Auto)     (1.5-5.0)  %


 


Baso % (Auto)     (0.0-3.0)  %


 


Gran #     (1.4-6.5)  


 


Lymph # (Auto)     (1.2-3.4)  


 


Mono # (Auto)     (0.1-0.6)  


 


Eos # (Auto)     (0.0-0.7)  


 


Baso # (Auto)     (0.0-2.0)  K/mm3


 


Sodium  Cancelled  119 L*  Cancelled  


 


Potassium  Cancelled   Cancelled  


 


Chloride  Cancelled   Cancelled  


 


Carbon Dioxide  Cancelled   Cancelled  


 


Anion Gap  Cancelled   Cancelled  


 


BUN  Cancelled   Cancelled  


 


Creatinine     (0.8-1.5)  mg/dl


 


Est GFR ( Amer)  Cancelled   Cancelled  


 


Est GFR (Non-Af Amer)  Cancelled   Cancelled  


 


Random Glucose  Cancelled   Cancelled  


 


Calcium  Cancelled   Cancelled  


 


Magnesium     (1.7-2.2)  mg/dL


 


Total Bilirubin     (0.2-1.3)  mg/dL


 


Direct Bilirubin     (0.0-0.4)  mg/dL


 


AST     (17-59)  U/L


 


ALT     (7-56)  U/L


 


Alkaline Phosphatase     ()  U/L


 


Total Creatine Kinase  436 H   493 H  ()  U/L


 


CK-MB (CK-2)  11.1 H   10.9 H  (0.0-3.6)  ng/mL


 


CK-MB (CK-2) %  2.5   2.2 L  (2.5-3.0)  %


 


Troponin I  0.02  D   0.03  ng/mL


 


Total Protein     (5.8-8.3)  g/dL


 


Albumin     (3.0-4.8)  g/dL


 


Globulin     gm/dL


 


Albumin/Globulin Ratio     (1.1-1.8)  


 


Digoxin     (0.8-2.0)  ng/mL








 Laboratory Results - last 24 hr











  03/01/18 03/01/18 03/01/18





  10:12 12:00 16:25


 


WBC   


 


RBC   


 


Hgb   


 


Hct   


 


MCV   


 


MCH   


 


MCHC   


 


RDW   


 


Plt Count   


 


MPV   


 


Gran %   


 


Lymph % (Auto)   


 


Mono % (Auto)   


 


Eos % (Auto)   


 


Baso % (Auto)   


 


Gran #   


 


Lymph # (Auto)   


 


Mono # (Auto)   


 


Eos # (Auto)   


 


Baso # (Auto)   


 


Sodium  Cancelled  119 L*  Cancelled


 


Potassium  Cancelled   Cancelled


 


Chloride  Cancelled   Cancelled


 


Carbon Dioxide  Cancelled   Cancelled


 


Anion Gap  Cancelled   Cancelled


 


BUN  Cancelled   Cancelled


 


Creatinine   


 


Est GFR ( Amer)  Cancelled   Cancelled


 


Est GFR (Non-Af Amer)  Cancelled   Cancelled


 


Random Glucose  Cancelled   Cancelled


 


Calcium  Cancelled   Cancelled


 


Magnesium   


 


Total Bilirubin   


 


Direct Bilirubin   


 


AST   


 


ALT   


 


Alkaline Phosphatase   


 


Total Creatine Kinase  493 H   436 H


 


CK-MB (CK-2)  10.9 H   11.1 H


 


CK-MB (CK-2) %  2.2 L   2.5


 


Troponin I  0.03   0.02  D


 


Total Protein   


 


Albumin   


 


Globulin   


 


Albumin/Globulin Ratio   


 


Digoxin   














  03/01/18 03/01/18 03/02/18





  17:56 20:55 00:30


 


WBC   


 


RBC   


 


Hgb   


 


Hct   


 


MCV   


 


MCH   


 


MCHC   


 


RDW   


 


Plt Count   


 


MPV   


 


Gran %   


 


Lymph % (Auto)   


 


Mono % (Auto)   


 


Eos % (Auto)   


 


Baso % (Auto)   


 


Gran #   


 


Lymph # (Auto)   


 


Mono # (Auto)   


 


Eos # (Auto)   


 


Baso # (Auto)   


 


Sodium  120 L  120 L  121 L


 


Potassium   


 


Chloride   


 


Carbon Dioxide   


 


Anion Gap   


 


BUN   


 


Creatinine   


 


Est GFR ( Amer)   


 


Est GFR (Non-Af Amer)   


 


Random Glucose   


 


Calcium   


 


Magnesium   


 


Total Bilirubin   


 


Direct Bilirubin   


 


AST   


 


ALT   


 


Alkaline Phosphatase   


 


Total Creatine Kinase   


 


CK-MB (CK-2)   


 


CK-MB (CK-2) %   


 


Troponin I   


 


Total Protein   


 


Albumin   


 


Globulin   


 


Albumin/Globulin Ratio   


 


Digoxin   














  03/02/18 03/02/18 03/02/18





  06:08 06:08 06:08


 


WBC  6.3  


 


RBC  4.65  


 


Hgb  13.1 L  


 


Hct  37.9 L  


 


MCV  81.5  


 


MCH  28.2  


 


MCHC  34.6  


 


RDW  14.6 H  


 


Plt Count  177  


 


MPV  9.6  


 


Gran %  68.4 H  


 


Lymph % (Auto)  17.2 L  


 


Mono % (Auto)  13.3 H  


 


Eos % (Auto)  0.9 L  


 


Baso % (Auto)  0.2  


 


Gran #  4.33  


 


Lymph # (Auto)  1.1 L  


 


Mono # (Auto)  0.8 H  


 


Eos # (Auto)  0.1  


 


Baso # (Auto)  0.01  


 


Sodium   120 L 


 


Potassium   4.8 


 


Chloride   86 L 


 


Carbon Dioxide   24 


 


Anion Gap   14 


 


BUN   18 


 


Creatinine   1.1 


 


Est GFR ( Amer)   > 60 


 


Est GFR (Non-Af Amer)   > 60 


 


Random Glucose   92 


 


Calcium   9.5 


 


Magnesium   2.0 


 


Total Bilirubin   1.9 H 


 


Direct Bilirubin   1.2 H 


 


AST   115 H D 


 


ALT   72 H 


 


Alkaline Phosphatase   142 H 


 


Total Creatine Kinase   


 


CK-MB (CK-2)   


 


CK-MB (CK-2) %   


 


Troponin I   


 


Total Protein   7.0 


 


Albumin   3.4 


 


Globulin   3.6 


 


Albumin/Globulin Ratio   0.9 L 


 


Digoxin    0.7 L














Critical Care Progress Note





- Nutrition


Nutrition: 


 Nutrition











 Category Date Time Status


 


 Heart Healthy Diet [DIET] Diets  03/01/18 Lunch Ordered














Assessment/Plan





- Assessment and Plan (Free Text)


Plan: 


Patient seen and examined on rounds with resident, agree with note with 

following additions/exceptions


Patient is 78yo male with PMhx of Prostate Ca w/mets, Afib on A/C, admitted 

with severe hyponatremia and PNA


Currently afebrile, HD stable, AAOx3, NAD, mental status at baseline, Na 

improving with fluid restriction, renal following. 














Hyponatremia


AMS, resolved


PNA


Hx Prostate Ca


Dehydration


CHF, chronic


Afib on A/C








Recommend:


- supp o2 as needed


- pancutlure, BCx, UCx, Procal


- BP control


- resume A/C


- Cardiology eval


- fluid restriction


- check Ulytes, Cortisol level, TSH, Uric Acid


- neuro checks


- Q12hr BMP


- RUQ sono


- GI eval for elevated LFTs


- GI ppx


- DVT ppx


- Monitor in MICU

## 2018-03-03 LAB
ALBUMIN SERPL-MCNC: 3.5 G/DL (ref 3–4.8)
ALBUMIN/GLOB SERPL: 0.9 {RATIO} (ref 1.1–1.8)
ALT SERPL-CCNC: 67 U/L (ref 7–56)
AST SERPL-CCNC: 102 U/L (ref 17–59)
BASOPHILS # BLD AUTO: 0.03 K/MM3 (ref 0–2)
BASOPHILS NFR BLD: 0.5 % (ref 0–3)
BILIRUB DIRECT SERPL-MCNC: 1 MG/DL (ref 0–0.4)
BUN SERPL-MCNC: 15 MG/DL (ref 7–21)
BUN SERPL-MCNC: 16 MG/DL (ref 7–21)
BUN SERPL-MCNC: 16 MG/DL (ref 7–21)
CALCIUM SERPL-MCNC: 8.4 MG/DL (ref 8.4–10.5)
CALCIUM SERPL-MCNC: 9.3 MG/DL (ref 8.4–10.5)
CALCIUM SERPL-MCNC: 9.4 MG/DL (ref 8.4–10.5)
EOSINOPHIL # BLD: 0.1 10*3/UL (ref 0–0.7)
EOSINOPHIL NFR BLD: 1.5 % (ref 1.5–5)
ERYTHROCYTE [DISTWIDTH] IN BLOOD BY AUTOMATED COUNT: 14.8 % (ref 11.5–14.5)
GFR NON-AFRICAN AMERICAN: 58
GFR NON-AFRICAN AMERICAN: > 60
GFR NON-AFRICAN AMERICAN: > 60
GRANULOCYTES # BLD: 3.91 10*3/UL (ref 1.4–6.5)
GRANULOCYTES NFR BLD: 67 % (ref 50–68)
HGB BLD-MCNC: 12.6 G/DL (ref 14–18)
LYMPHOCYTES # BLD: 1.1 10*3/UL (ref 1.2–3.4)
LYMPHOCYTES NFR BLD AUTO: 18.5 % (ref 22–35)
MCH RBC QN AUTO: 27.6 PG (ref 25–35)
MCHC RBC AUTO-ENTMCNC: 33.5 G/DL (ref 31–37)
MCV RBC AUTO: 82.3 FL (ref 80–105)
MONOCYTES # BLD AUTO: 0.7 10*3/UL (ref 0.1–0.6)
MONOCYTES NFR BLD: 12.5 % (ref 1–6)
PLATELET # BLD: 193 10^3/UL (ref 120–450)
PMV BLD AUTO: 9.9 FL (ref 7–11)
RBC # BLD AUTO: 4.57 10^6/UL (ref 3.5–6.1)
WBC # BLD AUTO: 5.8 10^3/UL (ref 4.5–11)

## 2018-03-03 RX ADMIN — PANTOPRAZOLE SODIUM SCH MG: 20 TABLET, DELAYED RELEASE ORAL at 09:12

## 2018-03-03 RX ADMIN — DILTIAZEM HYDROCHLORIDE SCH MG: 240 CAPSULE, COATED, EXTENDED RELEASE ORAL at 09:09

## 2018-03-03 RX ADMIN — CEFTRIAXONE SCH MLS/HR: 2 INJECTION, POWDER, FOR SOLUTION INTRAMUSCULAR; INTRAVENOUS at 09:07

## 2018-03-03 RX ADMIN — LEVALBUTEROL SCH MG: 0.63 SOLUTION RESPIRATORY (INHALATION) at 07:10

## 2018-03-03 RX ADMIN — LEVALBUTEROL SCH MG: 0.63 SOLUTION RESPIRATORY (INHALATION) at 13:17

## 2018-03-03 RX ADMIN — Medication SCH MG: at 18:09

## 2018-03-03 RX ADMIN — LEVALBUTEROL SCH MG: 0.63 SOLUTION RESPIRATORY (INHALATION) at 21:05

## 2018-03-03 RX ADMIN — MORPHINE SULFATE PRN MG: 2 INJECTION, SOLUTION INTRAMUSCULAR; INTRAVENOUS at 09:13

## 2018-03-03 RX ADMIN — Medication SCH MG: at 09:10

## 2018-03-03 RX ADMIN — DIGOXIN SCH: 0.12 TABLET ORAL at 13:09

## 2018-03-03 RX ADMIN — PANTOPRAZOLE SODIUM SCH MG: 20 TABLET, DELAYED RELEASE ORAL at 18:09

## 2018-03-03 RX ADMIN — POLYETHYLENE GLYCOL 3350 SCH GM: 17 POWDER, FOR SOLUTION ORAL at 09:10

## 2018-03-03 RX ADMIN — LEVALBUTEROL SCH: 0.63 SOLUTION RESPIRATORY (INHALATION) at 01:54

## 2018-03-03 RX ADMIN — POLYETHYLENE GLYCOL 3350 SCH GM: 17 POWDER, FOR SOLUTION ORAL at 18:09

## 2018-03-03 NOTE — CP.PCM.PN
Subjective





- Date & Time of Evaluation


Date of Evaluation: 03/03/18


Time of Evaluation: 10:00





- Subjective


Subjective: 





Nephrology Consultation Note





Assessment: stable


Hypervolemic hyponatremia likely Multifactorial due to CHF, SIADH Caused by 

metastatic prostate CA


Hypertensive Chronic Kidney Disease (I12.9)


Altered MENTAL status


Chronic Kidney Disease (N18.3) Stage 3 with Cr 1.2 


metastatic cancer, HTN (I12.9)


CHF LVEF 20%


hx of BPH


Vitamin D deficiency





Plan


No acute need for renal replacement therapy at this time. 


Renal function remains stable


Na is improving


will give a dose of tolvaptan today, asked rn to check bmp at 4 pm to avoid 

overcorrection 


bp stable





S:seen and examiend, he feels ok today 








Physical Examination:      


General Appearance: Comfortable, in no acute respiratory distress, co-operative 

. 


Vitals reviewed and noted as below


Head; Atraumatic, normocephalic


ENT: no ulcers no thrush. Tongue is midline. Oropharynx: no rash or ulcers.


EYES: Pupils are equal, round and reactive to light accommodation. Eye muscles 

and extraocular movement intact. Sclera is anicteric.


Neck; supple no lymphadenopathy, no thyromegaly or bruit


Lungs: Normal respiratory rate/effort. Breath sounds reduced at base


Heart: Normal rate. s1s2 normal. No rub or gallop. 


Extremities: trace edema. No varicose veins


Neurological: Patient is alert, awake and oriented to person, place and time. 

No focal deficit. Strength bilateral appropriate and equal


Skin: Warm and dry. Normal turgor. No rash. Palpitation: Normal elasticity for 

age


Abdomen: Abdomen is soft. Bowel sounds +. There is no abdominal tenderness, no 

guarding/rigidity no organomegaly


Psych: normal insight and normal affect/mood


MSK: no joint tenderness or swelling. Digits and nails normal, no deformity


: kidney or bladder not palpable. Has Fraire catheter in place





Labs/imaging reviewed.


Past medical history, past surgical history, family history, social history, 

allergy reviewed and noted as below


Family hx: no hx of CKD. Rest non-contributor








Objective





- Vital Signs/Intake and Output


Vital Signs (last 24 hours): 


 











Temp Pulse Resp BP Pulse Ox


 


 97.7 F   75   14   146/87   97 


 


 03/03/18 11:30  03/03/18 12:45  03/03/18 06:44  03/03/18 09:09  03/03/18 06:00











- Medications


Medications: 


 Current Medications





Allopurinol (Zyloprim)  300 mg PO DAILY [facility]


   Last Admin: 03/03/18 09:08 Dose:  300 mg


Apixaban (Eliquis)  2.5 mg PO BID [facility]


   PRN Reason: Protocol


   Last Admin: 03/03/18 09:10 Dose:  2.5 mg


Aspirin (Ecotrin)  81 mg PO DAILY [facility]


   Last Admin: 03/03/18 09:08 Dose:  81 mg


Atorvastatin Calcium (Lipitor)  40 mg PO DIN [facility]


   Last Admin: 03/02/18 17:16 Dose:  40 mg


Bicalutamide (Casodex)  50 mg PO DAILY [facility]


   Last Admin: 03/03/18 12:58 Dose:  50 mg


Bisacodyl (Dulcolax)  10 mg RC DAILY [facility]


   PRN Reason: Constipation


Digoxin (Digoxin)  0.125 mg PO 1400 [facility]


   Last Admin: 03/03/18 13:09 Dose:  Not Given


Diltiazem HCl (Cardizem Cd)  240 mg PO DAILY [facility]


   Last Admin: 03/03/18 09:09 Dose:  240 mg


Docusate Sodium (Colace)  100 mg PO TID [facility]


   Last Admin: 03/03/18 13:00 Dose:  100 mg


Doxycycline Hyclate (Doryx)  100 mg PO Q12 [facility]


   PRN Reason: Protocol


   Last Admin: 03/03/18 09:08 Dose:  100 mg


Furosemide (Lasix)  20 mg IVP Q12 [facility]


   Last Admin: 03/03/18 09:09 Dose:  20 mg


Ceftriaxone Sodium (Rocephin 2 Gm Ivpb)  2 gm in 100 mls @ 100 mls/hr IVPB 

DAILY [facility]


   PRN Reason: Protocol


   Stop: 03/05/18 10:59


   Last Admin: 03/03/18 09:07 Dose:  100 mls/hr


Levalbuterol HCl (Xopenex)  0.63 mg IH [id number] [facility]


   Last Admin: 03/03/18 13:17 Dose:  0.63 mg


Levalbuterol HCl (Xopenex)  0.63 mg IH Q2H [facility]


   PRN Reason: Shortness of Breath


Magnesium Oxide (Mag-Ox)  800 mg PO BID [facility]


   Last Admin: 03/03/18 09:10 Dose:  800 mg


Metoprolol Tartrate (Lopressor)  50 mg PO BID [facility]


   Last Admin: 03/03/18 09:09 Dose:  50 mg


Morphine Sulfate (Morphine)  1 mg IVP Q4H [facility]


   PRN Reason: Pain, moderate (4-7)


   Last Admin: 03/03/18 09:13 Dose:  1 mg


Pantoprazole Sodium (Protonix Ec Tab)  20 mg PO ACBD [facility]


   Last Admin: 03/03/18 09:12 Dose:  20 mg


Polyethylene Glycol (Miralax)  17 gm PO BID [facility]


   Last Admin: 03/03/18 09:10 Dose:  17 gm











- Labs


Labs: 


 





 03/03/18 05:30 





 03/03/18 05:30 





 











PT  18.5 SECONDS (9.4-12.5)  H  03/01/18  04:45    


 


INR  1.59  (0.93-1.08)  H  03/01/18  04:45    


 


APTT  36.5 Seconds (25.1-36.5)   03/01/18  04:45

## 2018-03-03 NOTE — CARD
--------------- APPROVED REPORT --------------





EKG Measurement

Heart [id number]

[id number]

[id number]

[id number]



<Conclusion>

Atrial fibrillation

Low voltage QRS

ST & T wave abnormality, consider lateral ischemia or digitalis effect

Abnormal ECG

## 2018-03-03 NOTE — PN
DATE:  03/02/2018



INTENSIVIST NOTE



SUBJECTIVE:  The patient is resting in bed, awake and alert with no

complaints of shortness of breath, cough, wheezing, chest congestion.  No

chest pain.  No abdominal pain.  No fever, chills or nausea or vomiting.



PHYSICAL EXAMINATION:

VITAL SIGNS:  Temperature is 97.2, pulse is 66, respirations of 14 and BP

is 134/93.

SKIN:  Warm and dry.

HEENT:  Head atraumatic, normocephalic.  Eyes reactive to light. Ear, nose

and throat seem to be within normal limits.

NECK:  Supple.  No JVD.  No thyroid enlargement or lymph nodes.

HEART:  Has a regular rate and rhythm.  Normal S1, S2.

LUNGS:  Reveal good breath sounds bilaterally.

ABDOMEN:  Soft.  Decreased bowel sounds.

GENITALIA AND RECTAL:  Deferred.

MUSCULOSKELETAL:  No joint deformities.

EXTREMITIES:  Reveal trace lower extremity edema.

NEUROLOGIC:  He seem to be grossly intact.



LABORATORY DATA:  As far as his laboratories are concerned, his white count

is 5.8, hemoglobin is 12.6, hematocrit 37.6 with platelets of 193,000. 

Sodium is 126, potassium 4.6, chloride 86, CO2 of 28 with a BUN of 16,

creatinine of 1.2.



IMPRESSION:   This patient has hyponatremia and initially presented with

altered mental status, which has cleared.  The patient has a history of

metastatic prostate cancer and chronic kidney insufficiency as well as

cardiomyopathy with a history of congestive heart failure and hypertension.



PLAN:  We will continue to restrict fluids and monitor his sodium closely. 

The patient continues to get his digoxin as well as diltiazem and is

getting Xopenex for bronchodilator.  He continues to be on Rocephin and

Protonix.  The patient will continue with Lipitor and Ecotrin as well as

his Eliquis and Lasix.  We will continue to follow closely and treat

aggressively along with the other consultants and the primary care doctor.







__________________________________________

Oziel Luciano MD



DD:  03/03/2018 8:00:19

DT:  03/03/2018 8:04:44

Job # [id number]

## 2018-03-03 NOTE — PN
DATE:



SUBJECTIVE:  Patient is in the Intensive Care Unit, room 4.  Patient was

admitted from the nursing home.  Patient has swelling of the right leg.  He

had hyponatremia, altered mental state when he was brought into the

Emergency Room;  he was admitted for evaluation and management.  Patient

has been in the hospital for a couple of days.  His past history is

significant in that he has history of atrial fibrillation.  Patient has a

history of hypertension, atherosclerotic heart disease.  Patient has a

history of prostate carcinoma with metastatic disease.  Patient's swelling

on the right leg is probably due to local compression or may be DVT, so an

ultrasound is ordered for the patient.



PHYSICAL EXAMINATION

VITAL SIGNS:  Pulse is 66, blood pressure is 110/70, temperature is 98.

LUNGS:  Clinically clear.

HEART:  Atrial fibrillation with rapid ventricular response.

ABDOMEN:  Soft.  No masses.  No tenderness.

CENTRAL NERVOUS SYSTEM:  Patient is conscious.  Mild confusion, but does

answer all questions and is pleasant.

EXTREMITIES:  The examination of the right leg shows swelling and edema. 

The patient probably has some superficial skin infection.



MEDICATIONS:  He is on doxycycline.  Patient gets Casodex for prostrate CA.

He is on digoxin.  Patient is on Eliquis twice a day.  Patient is on Lasix

20 mg IV q. 12 hours, Lipitor 40 mg, metoprolol 50 mg b.i.d., magnesium

oxide 800 mg b.i.d.  Patient is on MiraLax for constipation.



His condition is clinically stable although patient has ongoing hyponatremia. 

His swelling of his right leg will need to be addressed at this time.  We

will follow up and have the consultants comment on his condition.











__________________________________________

Derian Mercer MD



DD:  03/03/2018 8:59:55

DT:  03/03/2018 9:57:32

Job # [id number]



GABBY

## 2018-03-03 NOTE — CP.PCM.PN
Subjective





- Date & Time of Evaluation


Date of Evaluation: 03/03/18


Time of Evaluation: 18:45





- Subjective


Subjective: 





Feeling better





Objective





- Vital Signs/Intake and Output


Vital Signs (last 24 hours): 


 











Temp Pulse Resp BP Pulse Ox


 


 98.6 F   65   16   115/73   97 


 


 03/03/18 17:04  03/03/18 18:08  03/03/18 17:04  03/03/18 18:08  03/03/18 17:04








Intake and Output: 


 











 03/03/18 03/04/18





 18:59 06:59


 


Intake Total 900 


 


Output Total 600 


 


Balance 300 














- Medications


Medications: 


 Current Medications





Allopurinol (Zyloprim)  300 mg PO DAILY [facility]


   Last Admin: 03/03/18 09:08 Dose:  300 mg


Apixaban (Eliquis)  2.5 mg PO BID [facility]


   PRN Reason: Protocol


   Last Admin: 03/03/18 18:09 Dose:  2.5 mg


Aspirin (Ecotrin)  81 mg PO DAILY [facility]


   Last Admin: 03/03/18 09:08 Dose:  81 mg


Atorvastatin Calcium (Lipitor)  40 mg PO DIN [facility]


   Last Admin: 03/03/18 18:09 Dose:  40 mg


Bicalutamide (Casodex)  50 mg PO DAILY [facility]


   Last Admin: 03/03/18 12:58 Dose:  50 mg


Bisacodyl (Dulcolax)  10 mg RC DAILY [facility]


   PRN Reason: Constipation


Digoxin (Digoxin)  0.125 mg PO 1400 [facility]


   Last Admin: 03/03/18 13:09 Dose:  Not Given


Diltiazem HCl (Cardizem Cd)  240 mg PO DAILY [facility]


   Last Admin: 03/03/18 09:09 Dose:  240 mg


Docusate Sodium (Colace)  100 mg PO TID [facility]


   Last Admin: 03/03/18 18:09 Dose:  100 mg


Doxycycline Hyclate (Doryx)  100 mg PO Q12 [facility]


   PRN Reason: Protocol


   Last Admin: 03/03/18 09:08 Dose:  100 mg


Furosemide (Lasix)  20 mg IVP Q12 [facility]


   Last Admin: 03/03/18 09:09 Dose:  20 mg


Ceftriaxone Sodium (Rocephin 2 Gm Ivpb)  2 gm in 100 mls @ 100 mls/hr IVPB 

DAILY [facility]


   PRN Reason: Protocol


   Stop: 03/05/18 10:59


   Last Admin: 03/03/18 09:07 Dose:  100 mls/hr


Levalbuterol HCl (Xopenex)  0.63 mg IH [id number] [facility]


   Last Admin: 03/03/18 13:17 Dose:  0.63 mg


Levalbuterol HCl (Xopenex)  0.63 mg IH Q2H PRN


   PRN Reason: Shortness of Breath


Magnesium Oxide (Mag-Ox)  800 mg PO BID [facility]


   Last Admin: 03/03/18 18:09 Dose:  800 mg


Metoprolol Tartrate (Lopressor)  50 mg PO BID [facility]


   Last Admin: 03/03/18 18:08 Dose:  50 mg


Morphine Sulfate (Morphine)  1 mg IVP Q4H PRN


   PRN Reason: Pain, moderate (4-7)


   Last Admin: 03/03/18 09:13 Dose:  1 mg


Pantoprazole Sodium (Protonix Ec Tab)  20 mg PO ACBD [facility]


   Last Admin: 03/03/18 18:09 Dose:  20 mg


Polyethylene Glycol (Miralax)  17 gm PO BID [facility]


   Last Admin: 03/03/18 18:09 Dose:  17 gm











- Labs


Labs: 


 





 03/03/18 05:30 





 03/03/18 17:30 





 











PT  18.5 SECONDS (9.4-12.5)  H  03/01/18  04:45    


 


INR  1.59  (0.93-1.08)  H  03/01/18  04:45    


 


APTT  36.5 Seconds (25.1-36.5)   03/01/18  04:45    














- Head Exam


Head Exam: ATRAUMATIC





- Eye Exam


Eye Exam: Normal appearance





- ENT Exam


ENT Exam: Mucous Membranes Dry





- Respiratory Exam


Respiratory Exam: NORMAL BREATHING PATTERN





- Cardiovascular Exam


Cardiovascular Exam: +S1, +S2





- GI/Abdominal Exam


GI & Abdominal Exam: Normal Bowel Sounds





- Extremities Exam


Extremities Exam: Pedal Edema





Assessment and Plan


(1) Anemia


Assessment & Plan: 


chronic disease


bone mets





Status: Acute   





(2) Prostate cancer


Assessment & Plan: 


stage IV


bone mets


started on Casodex


outpatient Lupron injection


Status: Acute

## 2018-03-04 LAB
ALBUMIN SERPL-MCNC: 3.2 G/DL (ref 3–4.8)
ALBUMIN/GLOB SERPL: 0.9 {RATIO} (ref 1.1–1.8)
ALT SERPL-CCNC: 63 U/L (ref 7–56)
AST SERPL-CCNC: 91 U/L (ref 17–59)
BASOPHILS # BLD AUTO: 0.03 K/MM3 (ref 0–2)
BASOPHILS NFR BLD: 0.5 % (ref 0–3)
BILIRUB DIRECT SERPL-MCNC: 0.9 MG/DL (ref 0–0.4)
BUN SERPL-MCNC: 15 MG/DL (ref 7–21)
CALCIUM SERPL-MCNC: 9.4 MG/DL (ref 8.4–10.5)
EOSINOPHIL # BLD: 0 10*3/UL (ref 0–0.7)
EOSINOPHIL NFR BLD: 0.7 % (ref 1.5–5)
ERYTHROCYTE [DISTWIDTH] IN BLOOD BY AUTOMATED COUNT: 15 % (ref 11.5–14.5)
GFR NON-AFRICAN AMERICAN: 58
GRANULOCYTES # BLD: 3.82 10*3/UL (ref 1.4–6.5)
GRANULOCYTES NFR BLD: 66.6 % (ref 50–68)
HGB BLD-MCNC: 12.5 G/DL (ref 14–18)
LYMPHOCYTES # BLD: 1.1 10*3/UL (ref 1.2–3.4)
LYMPHOCYTES NFR BLD AUTO: 19.5 % (ref 22–35)
MCH RBC QN AUTO: 28.2 PG (ref 25–35)
MCHC RBC AUTO-ENTMCNC: 34.2 G/DL (ref 31–37)
MCV RBC AUTO: 82.2 FL (ref 80–105)
MONOCYTES # BLD AUTO: 0.7 10*3/UL (ref 0.1–0.6)
MONOCYTES NFR BLD: 12.7 % (ref 1–6)
OSMOLALITY,URINE: 118 MOSM/KG (ref 300–1000)
PLATELET # BLD: 162 10^3/UL (ref 120–450)
PMV BLD AUTO: 9.2 FL (ref 7–11)
RBC # BLD AUTO: 4.44 10^6/UL (ref 3.5–6.1)
WBC # BLD AUTO: 5.7 10^3/UL (ref 4.5–11)

## 2018-03-04 RX ADMIN — POLYETHYLENE GLYCOL 3350 SCH GM: 17 POWDER, FOR SOLUTION ORAL at 10:18

## 2018-03-04 RX ADMIN — LEVALBUTEROL SCH MG: 0.63 SOLUTION RESPIRATORY (INHALATION) at 02:40

## 2018-03-04 RX ADMIN — Medication SCH MG: at 17:51

## 2018-03-04 RX ADMIN — PANTOPRAZOLE SODIUM SCH MG: 20 TABLET, DELAYED RELEASE ORAL at 06:39

## 2018-03-04 RX ADMIN — CEFTRIAXONE SCH MLS/HR: 2 INJECTION, POWDER, FOR SOLUTION INTRAMUSCULAR; INTRAVENOUS at 13:05

## 2018-03-04 RX ADMIN — POLYETHYLENE GLYCOL 3350 SCH GM: 17 POWDER, FOR SOLUTION ORAL at 17:51

## 2018-03-04 RX ADMIN — MORPHINE SULFATE PRN MG: 2 INJECTION, SOLUTION INTRAMUSCULAR; INTRAVENOUS at 23:17

## 2018-03-04 RX ADMIN — DIGOXIN SCH MG: 0.12 TABLET ORAL at 13:08

## 2018-03-04 RX ADMIN — LEVALBUTEROL SCH MG: 0.63 SOLUTION RESPIRATORY (INHALATION) at 19:48

## 2018-03-04 RX ADMIN — POLYETHYLENE GLYCOL 3350 SCH GM: 17 POWDER, FOR SOLUTION ORAL at 17:50

## 2018-03-04 RX ADMIN — Medication SCH MG: at 10:19

## 2018-03-04 RX ADMIN — DILTIAZEM HYDROCHLORIDE SCH MG: 240 CAPSULE, COATED, EXTENDED RELEASE ORAL at 10:18

## 2018-03-04 RX ADMIN — MORPHINE SULFATE PRN MG: 2 INJECTION, SOLUTION INTRAMUSCULAR; INTRAVENOUS at 08:12

## 2018-03-04 RX ADMIN — PANTOPRAZOLE SODIUM SCH MG: 20 TABLET, DELAYED RELEASE ORAL at 17:51

## 2018-03-04 RX ADMIN — LEVALBUTEROL SCH MG: 0.63 SOLUTION RESPIRATORY (INHALATION) at 13:27

## 2018-03-04 RX ADMIN — LEVALBUTEROL SCH MG: 0.63 SOLUTION RESPIRATORY (INHALATION) at 08:08

## 2018-03-04 NOTE — CARD
--------------- APPROVED REPORT --------------





EKG Measurement

Heart [id number]

[id number]

[id number]

[id number]



<Conclusion>

Atrial fibrillation

Low voltage QRS

ST & T wave abnormality, consider lateral ischemia or digitalis effect

Abnormal ECG

## 2018-03-04 NOTE — CP.PCM.PN
Subjective





- Date & Time of Evaluation


Date of Evaluation: 03/04/18


Time of Evaluation: 08:00





- Subjective


Subjective: 





(covering for Dr. Márquez)


Patient is seen this morning.  He was transferred out of the ICU yesterday.


He says he gets pain in his right leg, but denies pain at this time.





Objective





- Vital Signs/Intake and Output


Vital Signs (last 24 hours): 


 











Temp Pulse Resp BP Pulse Ox


 


 98.2 F   64   20   130/73   98 


 


 03/04/18 06:00  03/04/18 06:00  03/04/18 06:00  03/04/18 06:00  03/04/18 06:00








Intake and Output: 


 











 03/04/18 03/04/18





 06:59 18:59


 


Intake Total 340 


 


Output Total 3200 


 


Balance -2860 














- Medications


Medications: 


 Current Medications





Allopurinol (Zyloprim)  300 mg PO DAILY [facility]


   Last Admin: 03/03/18 09:08 Dose:  300 mg


Apixaban (Eliquis)  2.5 mg PO BID [facility]


   PRN Reason: Protocol


   Last Admin: 03/03/18 18:09 Dose:  2.5 mg


Aspirin (Ecotrin)  81 mg PO DAILY [facility]


   Last Admin: 03/03/18 09:08 Dose:  81 mg


Atorvastatin Calcium (Lipitor)  40 mg PO DIN [facility]


   Last Admin: 03/03/18 18:09 Dose:  40 mg


Bicalutamide (Casodex)  50 mg PO DAILY [facility]


   Last Admin: 03/03/18 12:58 Dose:  50 mg


Bisacodyl (Dulcolax)  10 mg RC DAILY [facility]


   PRN Reason: Constipation


Digoxin (Digoxin)  0.125 mg PO 1400 [facility]


   Last Admin: 03/03/18 13:09 Dose:  Not Given


Diltiazem HCl (Cardizem Cd)  240 mg PO DAILY [facility]


   Last Admin: 03/03/18 09:09 Dose:  240 mg


Docusate Sodium (Colace)  100 mg PO TID [facility]


   Last Admin: 03/03/18 18:09 Dose:  100 mg


Doxycycline Hyclate (Doryx)  100 mg PO Q12 [facility]


   PRN Reason: Protocol


   Last Admin: 03/03/18 21:32 Dose:  100 mg


Furosemide (Lasix)  20 mg IVP Q12 [facility]


   Last Admin: 03/03/18 21:32 Dose:  20 mg


Ceftriaxone Sodium (Rocephin 2 Gm Ivpb)  2 gm in 100 mls @ 100 mls/hr IVPB 

DAILY [facility]


   PRN Reason: Protocol


   Stop: 03/05/18 10:59


   Last Admin: 03/03/18 09:07 Dose:  100 mls/hr


Levalbuterol HCl (Xopenex)  0.63 mg IH [id number] [facility]


   Last Admin: 03/04/18 08:08 Dose:  0.63 mg


Levalbuterol HCl (Xopenex)  0.63 mg IH Q2H [facility]


   PRN Reason: Shortness of Breath


Magnesium Oxide (Mag-Ox)  800 mg PO BID [facility]


   Last Admin: 03/03/18 18:09 Dose:  800 mg


Metoprolol Tartrate (Lopressor)  50 mg PO BID [facility]


   Last Admin: 03/03/18 18:08 Dose:  50 mg


Morphine Sulfate (Morphine)  1 mg IVP Q4H [facility]


   PRN Reason: Pain, moderate (4-7)


   Last Admin: 03/04/18 08:12 Dose:  1 mg


Pantoprazole Sodium (Protonix Ec Tab)  20 mg PO ACBD [facility]


   Last Admin: 03/04/18 06:39 Dose:  20 mg


Polyethylene Glycol (Miralax)  17 gm PO BID [facility]


   Last Admin: 03/03/18 18:09 Dose:  17 gm











- Labs


Labs: 


 





 03/04/18 07:30 





 03/03/18 17:30 





 











PT  18.5 SECONDS (9.4-12.5)  H  03/01/18  04:45    


 


INR  1.59  (0.93-1.08)  H  03/01/18  04:45    


 


APTT  36.5 Seconds (25.1-36.5)   03/01/18  04:45    














- Constitutional


Appears: No Acute Distress





- Head Exam


Head Exam: ATRAUMATIC, NORMOCEPHALIC





- Respiratory Exam


Respiratory Exam: Clear to Ausculation Bilateral, NORMAL BREATHING PATTERN





- Cardiovascular Exam


Cardiovascular Exam: +S1, +S2





- GI/Abdominal Exam


GI & Abdominal Exam: Soft, Normal Bowel Sounds.  absent: Tenderness





- Extremities Exam


Extremities Exam: Pedal Edema





- Neurological Exam


Neurological Exam: Alert, Awake





Assessment and Plan





- Assessment and Plan (Free Text)


Assessment: 





Hyponatremia


AMS


Cardiomyopathy with EF of 20%


Atrial fibrillation


stage IV metastatic prostate Cancer





Plan: 





Patient was transferred out of the intensive care unit yesterday.  He has 

swelling of his legs, 


right greater than left.  Swelling of right leg is improved since yesterday.  

Doppler of right leg


done yesterday.  results are pending.  Patient is on Eliquis for atrial 

fibrillation, however.


continue Lasix.

## 2018-03-04 NOTE — US
PROCEDURE:  Right lower extremity venous US 



HISTORY:

Leg pain and swelling. Evaluate for DVT.



PHYSICIAN(S):  Adam Shine M.D.



TECHNIQUE:

Duplex sonography and color-flow Doppler with graded compression were 

used to evaluate the deep venous system of the right lower extremity. 

The exam is somewhat limited by edema.



FINDINGS:

The visualized deep venous system of the right lower extremity is 

sonographically normal and compressible. Normal waveforms and 

augmentation are seen. There is no sonographic evidence for deep 

venous thrombosis in the visualized segments of the right lower 

extremity.



IMPRESSION:

1. No sonographic evidence for deep venous thrombosis in the 

visualized segments of the right lower extremity.

## 2018-03-05 LAB
ALBUMIN SERPL-MCNC: 3 G/DL (ref 3–4.8)
ALBUMIN/GLOB SERPL: 0.9 {RATIO} (ref 1.1–1.8)
ALT SERPL-CCNC: 56 U/L (ref 7–56)
AST SERPL-CCNC: 89 U/L (ref 17–59)
BASOPHILS # BLD AUTO: 0.04 K/MM3 (ref 0–2)
BASOPHILS NFR BLD: 0.7 % (ref 0–3)
BILIRUB DIRECT SERPL-MCNC: 0.9 MG/DL (ref 0–0.4)
BUN SERPL-MCNC: 13 MG/DL (ref 7–21)
CALCIUM SERPL-MCNC: 9.3 MG/DL (ref 8.4–10.5)
EOSINOPHIL # BLD: 0.1 10*3/UL (ref 0–0.7)
EOSINOPHIL NFR BLD: 1.1 % (ref 1.5–5)
ERYTHROCYTE [DISTWIDTH] IN BLOOD BY AUTOMATED COUNT: 15.2 % (ref 11.5–14.5)
GFR NON-AFRICAN AMERICAN: 58
GRANULOCYTES # BLD: 3.95 10*3/UL (ref 1.4–6.5)
GRANULOCYTES NFR BLD: 64.5 % (ref 50–68)
HGB BLD-MCNC: 12.4 G/DL (ref 14–18)
LYMPHOCYTES # BLD: 1.2 10*3/UL (ref 1.2–3.4)
LYMPHOCYTES NFR BLD AUTO: 20.1 % (ref 22–35)
MCH RBC QN AUTO: 27.7 PG (ref 25–35)
MCHC RBC AUTO-ENTMCNC: 33.2 G/DL (ref 31–37)
MCV RBC AUTO: 83.3 FL (ref 80–105)
MONOCYTES # BLD AUTO: 0.8 10*3/UL (ref 0.1–0.6)
MONOCYTES NFR BLD: 13.6 % (ref 1–6)
PLATELET # BLD: 173 10^3/UL (ref 120–450)
PMV BLD AUTO: 9.7 FL (ref 7–11)
RBC # BLD AUTO: 4.48 10^6/UL (ref 3.5–6.1)
WBC # BLD AUTO: 6.1 10^3/UL (ref 4.5–11)

## 2018-03-05 RX ADMIN — POLYETHYLENE GLYCOL 3350 SCH GM: 17 POWDER, FOR SOLUTION ORAL at 10:43

## 2018-03-05 RX ADMIN — DIGOXIN SCH MG: 0.12 TABLET ORAL at 15:57

## 2018-03-05 RX ADMIN — LEVALBUTEROL SCH MG: 0.63 SOLUTION RESPIRATORY (INHALATION) at 21:50

## 2018-03-05 RX ADMIN — LEVALBUTEROL SCH MG: 0.63 SOLUTION RESPIRATORY (INHALATION) at 07:44

## 2018-03-05 RX ADMIN — LEVALBUTEROL SCH MG: 0.63 SOLUTION RESPIRATORY (INHALATION) at 13:44

## 2018-03-05 RX ADMIN — Medication SCH MG: at 10:46

## 2018-03-05 RX ADMIN — LEVALBUTEROL SCH MG: 0.63 SOLUTION RESPIRATORY (INHALATION) at 02:37

## 2018-03-05 RX ADMIN — Medication SCH MG: at 18:27

## 2018-03-05 RX ADMIN — PANTOPRAZOLE SODIUM SCH MG: 20 TABLET, DELAYED RELEASE ORAL at 15:58

## 2018-03-05 RX ADMIN — PANTOPRAZOLE SODIUM SCH MG: 20 TABLET, DELAYED RELEASE ORAL at 08:24

## 2018-03-05 RX ADMIN — CEFTRIAXONE SCH MLS/HR: 2 INJECTION, POWDER, FOR SOLUTION INTRAMUSCULAR; INTRAVENOUS at 10:43

## 2018-03-05 RX ADMIN — POLYETHYLENE GLYCOL 3350 SCH GM: 17 POWDER, FOR SOLUTION ORAL at 18:28

## 2018-03-05 RX ADMIN — DILTIAZEM HYDROCHLORIDE SCH MG: 240 CAPSULE, COATED, EXTENDED RELEASE ORAL at 10:46

## 2018-03-05 NOTE — CP.PCM.PN
Subjective





- Date & Time of Evaluation


Date of Evaluation: 03/05/18


Time of Evaluation: 11:44





- Subjective


Subjective: 





Patient seen and examined at bedside. Patient is alert and oriented this 

morning. Patient with no acute events overnight. Denies chest pain, shortness 

of breath, nausea, vomiting, diarrhea, fever, chills. 





Objective





- Vital Signs/Intake and Output


Vital Signs (last 24 hours): 


 











Temp Pulse Resp BP Pulse Ox


 


 97.3 F L  75   20   129/72   100 


 


 03/05/18 06:00  03/05/18 10:46  03/05/18 06:00  03/05/18 10:45  03/05/18 06:00








Intake and Output: 


 











 03/05/18 03/05/18





 06:59 18:59


 


Intake Total 0 


 


Output Total 1500 


 


Balance -1500 














- Medications


Medications: 


 Current Medications





Allopurinol (Zyloprim)  300 mg PO DAILY [facility]


   Last Admin: 03/05/18 10:46 Dose:  300 mg


Apixaban (Eliquis)  2.5 mg PO BID [facility]


   PRN Reason: Protocol


   Last Admin: 03/05/18 10:46 Dose:  2.5 mg


Aspirin (Ecotrin)  81 mg PO DAILY [facility]


   Last Admin: 03/05/18 10:47 Dose:  81 mg


Atorvastatin Calcium (Lipitor)  40 mg PO DIN [facility]


   Last Admin: 03/04/18 17:50 Dose:  40 mg


Bicalutamide (Casodex)  50 mg PO DAILY [facility]


   Last Admin: 03/05/18 11:24 Dose:  50 mg


Bisacodyl (Dulcolax)  10 mg RC DAILY [facility]


   PRN Reason: Constipation


Digoxin (Digoxin)  0.125 mg PO 1400 [facility]


   Last Admin: 03/04/18 13:08 Dose:  0.125 mg


Diltiazem HCl (Cardizem Cd)  240 mg PO DAILY [facility]


   Last Admin: 03/05/18 10:46 Dose:  240 mg


Docusate Sodium (Colace)  100 mg PO TID [facility]


   Last Admin: 03/05/18 10:46 Dose:  100 mg


Doxycycline Hyclate (Doryx)  100 mg PO Q12 [facility]


   PRN Reason: Protocol


   Last Admin: 03/05/18 10:46 Dose:  100 mg


Furosemide (Lasix)  20 mg IVP Q12 [facility]


   Last Admin: 03/05/18 10:45 Dose:  20 mg


Levalbuterol HCl (Xopenex)  0.63 mg IH [id number] [facility]


   Last Admin: 03/05/18 07:44 Dose:  0.63 mg


Levalbuterol HCl (Xopenex)  0.63 mg IH Q2H [facility]


   PRN Reason: Shortness of Breath


Magnesium Oxide (Mag-Ox)  800 mg PO BID [facility]


   Last Admin: 03/05/18 10:46 Dose:  800 mg


Metoprolol Tartrate (Lopressor)  50 mg PO BID [facility]


   Last Admin: 03/05/18 10:46 Dose:  50 mg


Morphine Sulfate (Morphine)  1 mg IVP Q4H [facility]


   PRN Reason: Pain, moderate (4-7)


   Last Admin: 03/04/18 23:17 Dose:  1 mg


Pantoprazole Sodium (Protonix Ec Tab)  20 mg PO ACBD [facility]


   Last Admin: 03/05/18 08:24 Dose:  20 mg


Polyethylene Glycol (Miralax)  17 gm PO BID [facility]


   Last Admin: 03/05/18 10:43 Dose:  17 gm











- Labs


Labs: 


 





 03/05/18 06:45 





 03/05/18 06:45 





 











PT  18.5 SECONDS (9.4-12.5)  H  03/01/18  04:45    


 


INR  1.59  (0.93-1.08)  H  03/01/18  04:45    


 


APTT  36.5 Seconds (25.1-36.5)   03/01/18  04:45    














- Constitutional


Appears: Non-toxic, No Acute Distress





- ENT Exam


ENT Exam: Mucous Membranes Moist





- Respiratory Exam


Respiratory Exam: Decreased Breath Sounds, NORMAL BREATHING PATTERN.  absent: 

Rhonchi, Wheezes





- Cardiovascular Exam


Cardiovascular Exam: RRR, +S1, +S2





- GI/Abdominal Exam


GI & Abdominal Exam: Distended, Normal Bowel Sounds.  absent: Guarding, Rigid, 

Tenderness, Rebound





- Extremities Exam


Extremities Exam: Pedal Edema


Additional comments: 





Right foot +1 





- Neurological Exam


Neurological Exam: Alert, Awake, Oriented x3





- Psychiatric Exam


Psychiatric exam: Normal Affect, Normal Mood





- Skin


Skin Exam: Intact, Normal Color, Warm





Assessment and Plan





- Assessment and Plan (Free Text)


Plan: 





77 year old male with past medical history of prostate cancer, CHF, A-fib on 

eliquis, and hypertension presents to the ED from nursing home for altered 

mental status likely secondary to severe hyponatremia. Altered mental status 

now resolved with improving hyponatremia. He is currently on fluid restriction 

and being followed by Nephrology. He will continue on Lasix and may be 

transitioned to oral lasix as per Nephrology. Right lower extremity ultrasound 

is negative for DVT. We will continue the patient on Rocephin and Doxycycline 

for possible CAP. Patient to be evaluated by TCU. We will continue current 

medical regimen. We will continue to follow patient closely. 





Campos, PGY-2

## 2018-03-05 NOTE — CP.PCM.PN
Subjective





- Date & Time of Evaluation


Date of Evaluation: 03/05/18


Time of Evaluation: 11:17





- Subjective


Subjective: 





Nephrology Consultation Note





Assessment: stable


Hypervolemic hyponatremia likely Multifactorial due to CHF, SIADH Caused by 

metastatic prostate CA


Hypertensive Chronic Kidney Disease (I12.9)


Altered MENTAL status: resolved


Chronic Kidney Disease (N18.3) Stage 3 with Cr 1.2 


metastatic prostate cancer, HTN (I12.9)


systolic CHF LVEF 20%


hx of BPH


Vitamin D deficiency





Plan


No acute need for renal replacement therapy at this time. 


Na stable for now on oral fluid fluid restriction 1000 mL per day and lasix 20 

mg IV bid. hence continue with same. can change to lasix 40 mg po bid soon


will consider Samsca 30 mg/day if serum Na decreases despite above strategy.





Hypertension control with meds as ordered. unable to tolerate losartan due to 

hyperkalemia episodes.


Monitor Input/Output, daily weights and renal function with basic metabolic 

panel


supplement electrolytes as needed


Avoid nephrotoxins/NSAIDs


Glycemic control


Further work up for as per primary team. CHF management as per cardiology





Thanks for allowing me to participate in care of your patient. Will follow 

patient with you. Please call if any Qs. d/w team. 


Dr Rickey Tapia


Office: [phone] Cell: [phone] Fax: [phone]





Reason for consultation is Hyponatremia


HPI patient is 77-year-old male with history of chronic systolic CHF EF 20% 

also metastatic prostate cancer , BPH, recently seen for hyponatremia due to 

CHF and likely SIADH which was treated with samsca and and diuretic. Discharged 

to nursing home here with the acute change in mentaL status and low sodium of 

115 hence renal consult was requested. Patient at this time Feels better.He 

denies shortness of breath nausea vomiting any pain in his stomach.





Subjective/ROS: Noted events overnight. Patients feels okay. 


Denies chest pain, palpitation,improved shortness of breath, denies leg 

swelling. 


All other negative Except as mentioned in HPI. had bone biopsy by IR on 2/13/18 

Showed metastatic adeno carcinoma of prostate





Physical Examination:      


General Appearance: Comfortable, in no acute respiratory distress, co-operative 

. 


Vitals reviewed and noted as below


Head; Atraumatic, normocephalic


ENT: no ulcers no thrush. Tongue is midline. Oropharynx: no rash or ulcers.


EYES: Pupils are equal, round and reactive to light accommodation. Eye muscles 

and extraocular movement intact. Sclera is anicteric.


Neck; supple no lymphadenopathy, no thyromegaly or bruit


Lungs: Normal respiratory rate/effort. Breath sounds reduced at base


Heart: Normal rate. s1s2 normal. No rub or gallop. 


Extremities: RLE 1+ edema. No varicose veins


Neurological: Patient is alert, awake and oriented to person, place and time. 

No focal deficit. Strength bilateral appropriate and equal


Skin: Warm and dry. Normal turgor. No rash. Palpitation: Normal elasticity for 

age. Feets somewhat cool to touch


Abdomen: Abdomen is soft. Bowel sounds +. There is no abdominal tenderness, no 

guarding/rigidity no organomegaly


Psych: normal insight and normal affect/mood


MSK: no joint tenderness or swelling. Digits and nails normal, no deformity


: kidney or bladder not palpable. Has Fraire catheter in place





Labs/imaging reviewed.


Past medical history, past surgical history, family history, social history, 

allergy reviewed and noted as below


Family hx: no hx of CKD. Rest non-contributor





Objective





- Vital Signs/Intake and Output


Vital Signs (last 24 hours): 


 











Temp Pulse Resp BP Pulse Ox


 


 97.3 F L  75   20   129/72   100 


 


 03/05/18 06:00  03/05/18 10:46  03/05/18 06:00  03/05/18 10:45  03/05/18 06:00








Intake and Output: 


 











 03/05/18 03/05/18





 06:59 18:59


 


Intake Total 0 


 


Output Total 1500 


 


Balance -1500 














- Medications


Medications: 


 Current Medications





Allopurinol (Zyloprim)  300 mg PO DAILY [facility]


   Last Admin: 03/05/18 10:46 Dose:  300 mg


Apixaban (Eliquis)  2.5 mg PO BID [facility]


   PRN Reason: Protocol


   Last Admin: 03/05/18 10:46 Dose:  2.5 mg


Aspirin (Ecotrin)  81 mg PO DAILY [facility]


   Last Admin: 03/05/18 10:47 Dose:  81 mg


Atorvastatin Calcium (Lipitor)  40 mg PO DIN [facility]


   Last Admin: 03/04/18 17:50 Dose:  40 mg


Bicalutamide (Casodex)  50 mg PO DAILY [facility]


   Last Admin: 03/04/18 13:06 Dose:  50 mg


Bisacodyl (Dulcolax)  10 mg RC DAILY [facility]


   PRN Reason: Constipation


Digoxin (Digoxin)  0.125 mg PO 1400 [facility]


   Last Admin: 03/04/18 13:08 Dose:  0.125 mg


Diltiazem HCl (Cardizem Cd)  240 mg PO DAILY [facility]


   Last Admin: 03/05/18 10:46 Dose:  240 mg


Docusate Sodium (Colace)  100 mg PO TID [facility]


   Last Admin: 03/05/18 10:46 Dose:  100 mg


Doxycycline Hyclate (Doryx)  100 mg PO Q12 [facility]


   PRN Reason: Protocol


   Last Admin: 03/05/18 10:46 Dose:  100 mg


Furosemide (Lasix)  20 mg IVP Q12 [facility]


   Last Admin: 03/05/18 10:45 Dose:  20 mg


Levalbuterol HCl (Xopenex)  0.63 mg IH [id number] [facility]


   Last Admin: 03/05/18 07:44 Dose:  0.63 mg


Levalbuterol HCl (Xopenex)  0.63 mg IH Q2H [facility]


   PRN Reason: Shortness of Breath


Magnesium Oxide (Mag-Ox)  800 mg PO BID [facility]


   Last Admin: 03/05/18 10:46 Dose:  800 mg


Metoprolol Tartrate (Lopressor)  50 mg PO BID [facility]


   Last Admin: 03/05/18 10:46 Dose:  50 mg


Morphine Sulfate (Morphine)  1 mg IVP Q4H [facility]


   PRN Reason: Pain, moderate (4-7)


   Last Admin: 03/04/18 23:17 Dose:  1 mg


Pantoprazole Sodium (Protonix Ec Tab)  20 mg PO ACBD [facility]


   Last Admin: 03/05/18 08:24 Dose:  20 mg


Polyethylene Glycol (Miralax)  17 gm PO BID [facility]


   Last Admin: 03/05/18 10:43 Dose:  17 gm











- Labs


Labs: 


 





 03/05/18 06:45 





 03/05/18 06:45 





 











PT  18.5 SECONDS (9.4-12.5)  H  03/01/18  04:45    


 


INR  1.59  (0.93-1.08)  H  03/01/18  04:45    


 


APTT  36.5 Seconds (25.1-36.5)   03/01/18  04:45

## 2018-03-05 NOTE — CP.PCM.PN
Subjective





- Date & Time of Evaluation


Date of Evaluation: 03/05/18


Time of Evaluation: 18:15





- Subjective


Subjective: 





Feeling better, doing well.





Objective





- Vital Signs/Intake and Output


Vital Signs (last 24 hours): 


 











Temp Pulse Resp BP Pulse Ox


 


 98.1 F   64   18   101/61   100 


 


 03/05/18 14:00  03/05/18 14:00  03/05/18 14:00  03/05/18 21:46  03/05/18 14:00








Intake and Output: 


 











 03/05/18 03/06/18





 18:59 06:59


 


Intake Total  600


 


Output Total  200


 


Balance  400














- Medications


Medications: 


 Current Medications





Allopurinol (Zyloprim)  300 mg PO DAILY [facility]


   Last Admin: 03/05/18 10:46 Dose:  300 mg


Apixaban (Eliquis)  2.5 mg PO BID [facility]


   PRN Reason: Protocol


   Last Admin: 03/05/18 18:27 Dose:  2.5 mg


Aspirin (Ecotrin)  81 mg PO DAILY [facility]


   Last Admin: 03/05/18 10:47 Dose:  81 mg


Atorvastatin Calcium (Lipitor)  40 mg PO DIN [facility]


   Last Admin: 03/05/18 18:27 Dose:  40 mg


Bicalutamide (Casodex)  50 mg PO DAILY [facility]


   Last Admin: 03/05/18 11:24 Dose:  50 mg


Bisacodyl (Dulcolax)  10 mg RC DAILY [facility]


   PRN Reason: Constipation


Digoxin (Digoxin)  0.125 mg PO 1400 [facility]


   Last Admin: 03/05/18 15:57 Dose:  0.125 mg


Diltiazem HCl (Cardizem Cd)  240 mg PO DAILY [facility]


   Last Admin: 03/05/18 10:46 Dose:  240 mg


Docusate Sodium (Colace)  100 mg PO TID [facility]


   Last Admin: 03/05/18 18:28 Dose:  100 mg


Doxycycline Hyclate (Doryx)  100 mg PO Q12 [facility]


   PRN Reason: Protocol


   Last Admin: 03/05/18 21:46 Dose:  100 mg


Furosemide (Lasix)  20 mg IVP Q12 [facility]


   Last Admin: 03/05/18 21:46 Dose:  20 mg


Levalbuterol HCl (Xopenex)  0.63 mg IH [id number] [facility]


   Last Admin: 03/05/18 13:44 Dose:  0.63 mg


Levalbuterol HCl (Xopenex)  0.63 mg IH Q2H [facility]


   PRN Reason: Shortness of Breath


Magnesium Oxide (Mag-Ox)  800 mg PO BID [facility]


   Last Admin: 03/05/18 18:27 Dose:  800 mg


Metoprolol Tartrate (Lopressor)  50 mg PO BID [facility]


   Last Admin: 03/05/18 18:28 Dose:  50 mg


Morphine Sulfate (Morphine)  1 mg IVP Q4H [facility]


   PRN Reason: Pain, moderate (4-7)


   Last Admin: 03/04/18 23:17 Dose:  1 mg


Pantoprazole Sodium (Protonix Ec Tab)  20 mg PO ACBD [facility]


   Last Admin: 03/05/18 15:58 Dose:  20 mg


Polyethylene Glycol (Miralax)  17 gm PO BID [facility]


   Last Admin: 03/05/18 18:28 Dose:  17 gm











- Labs


Labs: 


 





 03/05/18 06:45 





 03/05/18 06:45 





 











PT  18.5 SECONDS (9.4-12.5)  H  03/01/18  04:45    


 


INR  1.59  (0.93-1.08)  H  03/01/18  04:45    


 


APTT  36.5 Seconds (25.1-36.5)   03/01/18  04:45    














- Head Exam


Head Exam: ATRAUMATIC





- Eye Exam


Eye Exam: Normal appearance





- ENT Exam


ENT Exam: Mucous Membranes Dry





- Respiratory Exam


Respiratory Exam: NORMAL BREATHING PATTERN





- Cardiovascular Exam


Cardiovascular Exam: +S1, +S2





- GI/Abdominal Exam


GI & Abdominal Exam: Normal Bowel Sounds





- Extremities Exam


Extremities Exam: Pedal Edema





Assessment and Plan


(1) Anemia


Assessment & Plan: 


chronic disease and bone mets





Status: Acute   





(2) Prostate cancer


Assessment & Plan: 


stage IV


bone metastasis


start on peripheral androgen blockade


outpatient Lupron


Status: Acute

## 2018-03-05 NOTE — PN
DATE:  03/05/2018



CARDIOLOGY FOLLOWUP



SUBJECTIVE:  The patient is comfortable in bed.  He is awake, alert.



PHYSICAL EXAMINATION

VITAL SIGNS:  Blood pressure is 130/72, the heart rates in the 70s.

NECK:  Negative JVD.

LUNGS:  Decreased breath sounds without rales.

HEART:  Reveals S1 and S2.

EXTREMITIES:  Without edema.



LABORATORY DATA:  Hemoglobin is 12.4.  BUN and creatinine is 13 and 1.2. 

The sodium is up to 131.



IMPRESSION:

1.  Improved hyponatremia.

2.  Atrial fibrillation with good heart rate control.

3.  Severely dilated cardiomyopathy.

4.  Pulmonary hypertension.



PLAN:  Given these findings, the patient's heart rate is well controlled on

Cardizem.  He is on Eliquis for his chronic atrial fibrillation.  We will

discontinue telemetry today.





__________________________________________

Adam Farah MD



DD:  03/05/2018 11:06:21

DT:  03/05/2018 11:08:43

Job # [id number]

## 2018-03-06 LAB
ALBUMIN SERPL-MCNC: 3 G/DL (ref 3–4.8)
ALBUMIN/GLOB SERPL: 0.9 {RATIO} (ref 1.1–1.8)
ALT SERPL-CCNC: 56 U/L (ref 7–56)
AST SERPL-CCNC: 84 U/L (ref 17–59)
BILIRUB DIRECT SERPL-MCNC: 0.7 MG/DL (ref 0–0.4)
BUN SERPL-MCNC: 14 MG/DL (ref 7–21)
CALCIUM SERPL-MCNC: 9.1 MG/DL (ref 8.4–10.5)
GFR NON-AFRICAN AMERICAN: 58

## 2018-03-06 RX ADMIN — PANTOPRAZOLE SODIUM SCH MG: 20 TABLET, DELAYED RELEASE ORAL at 09:35

## 2018-03-06 RX ADMIN — MORPHINE SULFATE PRN MG: 2 INJECTION, SOLUTION INTRAMUSCULAR; INTRAVENOUS at 19:45

## 2018-03-06 RX ADMIN — POLYETHYLENE GLYCOL 3350 SCH GM: 17 POWDER, FOR SOLUTION ORAL at 17:50

## 2018-03-06 RX ADMIN — Medication SCH MG: at 09:35

## 2018-03-06 RX ADMIN — DILTIAZEM HYDROCHLORIDE SCH MG: 240 CAPSULE, COATED, EXTENDED RELEASE ORAL at 09:43

## 2018-03-06 RX ADMIN — LEVALBUTEROL SCH MG: 0.63 SOLUTION RESPIRATORY (INHALATION) at 19:39

## 2018-03-06 RX ADMIN — PANTOPRAZOLE SODIUM SCH MG: 20 TABLET, DELAYED RELEASE ORAL at 17:54

## 2018-03-06 RX ADMIN — POLYETHYLENE GLYCOL 3350 SCH GM: 17 POWDER, FOR SOLUTION ORAL at 09:36

## 2018-03-06 RX ADMIN — LEVALBUTEROL SCH MG: 0.63 SOLUTION RESPIRATORY (INHALATION) at 03:41

## 2018-03-06 RX ADMIN — Medication SCH MG: at 17:51

## 2018-03-06 RX ADMIN — DIGOXIN SCH MG: 0.12 TABLET ORAL at 13:00

## 2018-03-06 RX ADMIN — LEVALBUTEROL SCH MG: 0.63 SOLUTION RESPIRATORY (INHALATION) at 07:26

## 2018-03-06 RX ADMIN — LEVALBUTEROL SCH: 0.63 SOLUTION RESPIRATORY (INHALATION) at 13:26

## 2018-03-06 RX ADMIN — MORPHINE SULFATE PRN MG: 2 INJECTION, SOLUTION INTRAMUSCULAR; INTRAVENOUS at 07:54

## 2018-03-06 NOTE — CP.PCM.PN
Subjective





- Date & Time of Evaluation


Date of Evaluation: 03/06/18


Time of Evaluation: 10:37





- Subjective


Subjective: 





Patient seen and examined at bedside. Patient is confused this morning after 

being oriented throughout yesterday. Patient does respond appropriately to 

commands. Denies chest pain, shortness of breath, nausea, vomiting, diarrhea, 

fever, chills. 





Objective





- Vital Signs/Intake and Output


Vital Signs (last 24 hours): 


 











Temp Pulse Resp BP Pulse Ox


 


 99.0 F   77   22   129/85   98 


 


 03/06/18 07:30  03/06/18 07:30  03/06/18 07:30  03/06/18 09:43  03/06/18 07:30








Intake and Output: 


 











 03/06/18 03/06/18





 06:59 18:59


 


Intake Total 780 


 


Output Total 1100 


 


Balance -320 














- Medications


Medications: 


 Current Medications





Allopurinol (Zyloprim)  300 mg PO DAILY [facility]


   Last Admin: 03/06/18 09:43 Dose:  300 mg


Apixaban (Eliquis)  2.5 mg PO BID [facility]


   PRN Reason: Protocol


   Last Admin: 03/06/18 09:35 Dose:  2.5 mg


Aspirin (Ecotrin)  81 mg PO DAILY [facility]


   Last Admin: 03/06/18 09:35 Dose:  81 mg


Atorvastatin Calcium (Lipitor)  40 mg PO DIN [facility]


   Last Admin: 03/05/18 18:27 Dose:  40 mg


Bicalutamide (Casodex)  50 mg PO DAILY [facility]


   Last Admin: 03/05/18 11:24 Dose:  50 mg


Bisacodyl (Dulcolax)  10 mg RC DAILY [facility]


   PRN Reason: Constipation


Digoxin (Digoxin)  0.125 mg PO 1400 [facility]


   Last Admin: 03/05/18 15:57 Dose:  0.125 mg


Diltiazem HCl (Cardizem Cd)  240 mg PO DAILY [facility]


   Last Admin: 03/06/18 09:43 Dose:  240 mg


Docusate Sodium (Colace)  100 mg PO TID [facility]


   Last Admin: 03/06/18 09:34 Dose:  100 mg


Doxycycline Hyclate (Doryx)  100 mg PO Q12 [facility]


   PRN Reason: Protocol


   Last Admin: 03/06/18 09:36 Dose:  100 mg


Furosemide (Lasix)  20 mg IVP Q12 [facility]


   Last Admin: 03/06/18 09:34 Dose:  20 mg


Ceftriaxone Sodium (Rocephin 2 Gm Ivpb)  2 gm in 100 mls @ 100 mls/hr IVPB 2200 

[facility]


   PRN Reason: Protocol


Levalbuterol HCl (Xopenex)  0.63 mg IH [id number] [facility]


   Last Admin: 03/06/18 07:26 Dose:  0.63 mg


Levalbuterol HCl (Xopenex)  0.63 mg IH Q2H [facility]


   PRN Reason: Shortness of Breath


Magnesium Oxide (Mag-Ox)  800 mg PO BID [facility]


   Last Admin: 03/06/18 09:35 Dose:  800 mg


Metoprolol Tartrate (Lopressor)  50 mg PO BID [facility]


   Last Admin: 03/06/18 09:38 Dose:  50 mg


Morphine Sulfate (Morphine)  1 mg IVP Q4H [facility]


   PRN Reason: Pain, moderate (4-7)


   Last Admin: 03/06/18 07:54 Dose:  1 mg


Pantoprazole Sodium (Protonix Ec Tab)  20 mg PO ACBD [facility]


   Last Admin: 03/06/18 09:35 Dose:  20 mg


Polyethylene Glycol (Miralax)  17 gm PO BID [facility]


   Last Admin: 03/06/18 09:36 Dose:  17 gm











- Labs


Labs: 


 





 03/05/18 06:45 





 03/06/18 07:00 





 











PT  18.5 SECONDS (9.4-12.5)  H  03/01/18  04:45    


 


INR  1.59  (0.93-1.08)  H  03/01/18  04:45    


 


APTT  36.5 Seconds (25.1-36.5)   03/01/18  04:45    














- Constitutional


Appears: Non-toxic, No Acute Distress





- Head Exam


Head Exam: ATRAUMATIC, NORMAL INSPECTION, NORMOCEPHALIC





- ENT Exam


ENT Exam: Mucous Membranes Moist





- Respiratory Exam


Respiratory Exam: Decreased Breath Sounds, NORMAL BREATHING PATTERN.  absent: 

Rales, Rhonchi, Wheezes





- Cardiovascular Exam


Cardiovascular Exam: RRR, +S1, +S2





- GI/Abdominal Exam


GI & Abdominal Exam: Soft, Normal Bowel Sounds.  absent: Tenderness





- Extremities Exam


Extremities Exam: Pedal Edema (+1 b/l).  absent: Calf Tenderness





- Neurological Exam


Neurological Exam: Alert, Awake.  absent: Oriented x3 (Oriented to person)





- Psychiatric Exam


Additional comments: 





Confused





- Skin


Skin Exam: Intact, Normal Color, Warm





Assessment and Plan





- Assessment and Plan (Free Text)


Plan: 








77 year old male with past medical history of prostate cancer, CHF, A-fib on 

eliquis, and hypertension presents to the ED from nursing home for altered 

mental status likely secondary to severe hyponatremia. Hyponatremia resolved, 

but patient also demonstrating new onset confusion this morning. Will obtain 

brain MRI at this time. He is currently on fluid restriction and being followed 

by Nephrology. Possible transition to oral lasix today as per nephrology. Right 

lower extremity ultrasound is negative for DVT. We will continue the patient on 

Rocephin and Doxycycline for possible CAP. Patient is not a candidate for TCU 

at this time. We will continue current medical regimen. We will continue to 

follow patient closely. 





Campos, PGY-2

## 2018-03-06 NOTE — MRI
PROCEDURE:  MRI BRAIN WITHOUT CONTRAST



HISTORY:

evaluate for mets



COMPARISON:

02/09/2018 MRI 



TECHNIQUE:

Multiplanar, multisequence MR images of the brain were obtained 

without intravenous contrast enhancement.



FINDINGS:



HEMORRHAGE:

None



DWI:

No evidence of an acute or early subacute infarction.



BRAIN PARENCHYMA:

No mass effect or edema. There is a moderate amount of atrophy



VENTRICLES:

Unremarkable. No hydrocephalus.



CRANIUM:

Unremarkable.



ORBITS:

Grossly unremarkable.



PARANASAL SINUSES/MASTOIDS:

Clear



VASCULAR SYSTEM:

Skull base flow voids intact.



OTHER FINDINGS:

None. 



IMPRESSION:

Unremarkable non contrast enhanced MRI of the brain.

## 2018-03-06 NOTE — CP.PCM.PN
Subjective





- Date & Time of Evaluation


Date of Evaluation: 03/06/18


Time of Evaluation: 16:35





- Subjective


Subjective: 





Appears comfortable, denies being confused





Objective





- Vital Signs/Intake and Output


Vital Signs (last 24 hours): 


 











Temp Pulse Resp BP Pulse Ox


 


 98.2 F   73   20   114/74   100 


 


 03/06/18 14:00  03/06/18 14:00  03/06/18 14:00  03/06/18 14:00  03/06/18 14:00








Intake and Output: 


 











 03/06/18 03/06/18





 06:59 18:59


 


Intake Total 780 480


 


Output Total 1100 300


 


Balance -320 180














- Medications


Medications: 


 Current Medications





Allopurinol (Zyloprim)  300 mg PO DAILY [facility]


   Last Admin: 03/06/18 09:43 Dose:  300 mg


Apixaban (Eliquis)  2.5 mg PO BID [facility]


   PRN Reason: Protocol


   Last Admin: 03/06/18 09:35 Dose:  2.5 mg


Aspirin (Ecotrin)  81 mg PO DAILY [facility]


   Last Admin: 03/06/18 09:35 Dose:  81 mg


Atorvastatin Calcium (Lipitor)  40 mg PO DIN [facility]


   Last Admin: 03/05/18 18:27 Dose:  40 mg


Bicalutamide (Casodex)  50 mg PO DAILY [facility]


   Last Admin: 03/06/18 12:55 Dose:  Not Given


Bisacodyl (Dulcolax)  10 mg RC DAILY [facility]


   PRN Reason: Constipation


Digoxin (Digoxin)  0.125 mg PO 1400 [facility]


   Last Admin: 03/06/18 13:00 Dose:  0.125 mg


Diltiazem HCl (Cardizem Cd)  240 mg PO DAILY [facility]


   Last Admin: 03/06/18 09:43 Dose:  240 mg


Docusate Sodium (Colace)  100 mg PO TID [facility]


   Last Admin: 03/06/18 13:00 Dose:  100 mg


Doxycycline Hyclate (Doryx)  100 mg PO Q12 [facility]


   PRN Reason: Protocol


   Last Admin: 03/06/18 09:36 Dose:  100 mg


Furosemide (Lasix)  20 mg IVP Q12 [facility]


   Last Admin: 03/06/18 09:34 Dose:  20 mg


Ceftriaxone Sodium (Rocephin 2 Gm Ivpb)  2 gm in 100 mls @ 100 mls/hr IVPB 2200 

[facility]


   PRN Reason: Protocol


Levalbuterol HCl (Xopenex)  0.63 mg IH [id number] [facility]


   Last Admin: 03/06/18 13:26 Dose:  Not Given


Levalbuterol HCl (Xopenex)  0.63 mg IH Q2H [facility]


   PRN Reason: Shortness of Breath


Magnesium Oxide (Mag-Ox)  800 mg PO BID [facility]


   Last Admin: 03/06/18 09:35 Dose:  800 mg


Metoprolol Tartrate (Lopressor)  50 mg PO BID [facility]


   Last Admin: 03/06/18 09:38 Dose:  50 mg


Morphine Sulfate (Morphine)  1 mg IVP Q4H [facility]


   PRN Reason: Pain, moderate (4-7)


   Last Admin: 03/06/18 07:54 Dose:  1 mg


Pantoprazole Sodium (Protonix Ec Tab)  20 mg PO ACBD [facility]


   Last Admin: 03/06/18 09:35 Dose:  20 mg


Polyethylene Glycol (Miralax)  17 gm PO BID [facility]


   Last Admin: 03/06/18 09:36 Dose:  17 gm











- Labs


Labs: 


 





 03/05/18 06:45 





 03/06/18 07:00 





 











PT  18.5 SECONDS (9.4-12.5)  H  03/01/18  04:45    


 


INR  1.59  (0.93-1.08)  H  03/01/18  04:45    


 


APTT  36.5 Seconds (25.1-36.5)   03/01/18  04:45    














- Head Exam


Head Exam: ATRAUMATIC





- Eye Exam


Eye Exam: Normal appearance





- ENT Exam


ENT Exam: Mucous Membranes Dry





- Respiratory Exam


Respiratory Exam: NORMAL BREATHING PATTERN





- Cardiovascular Exam


Cardiovascular Exam: +S1, +S2





- GI/Abdominal Exam


GI & Abdominal Exam: Normal Bowel Sounds





- Extremities Exam


Extremities Exam: Pedal Edema





Assessment and Plan


(1) Anemia


Assessment & Plan: 


chronic disease, bone metastasis


H/H fairly stable


Status: Acute   





(2) Prostate cancer


Assessment & Plan: 


stage IV


bone mets


on peripheral androgen blockade


outpatient Lupron


Status: Acute

## 2018-03-06 NOTE — PN
DATE:  03/06/2018



CARDIOLOGY FOLLOWUP



SUBJECTIVE:  The patient's confusion is noted.



PHYSICAL EXAMINATION:

VITAL SIGNS:  Blood pressure is 130/85 with heart rates in the 70s.

NECK:  Negative JVD.

LUNGS:  Without rales.

HEART:  Reveals S1, S2.

EXTREMITIES:  Trace edema.



LABORATORY DATA:  Hemoglobin is 12.4.  Chemistries:  BUN and creatinine are

unremarkable.  Sodium is 135.



IMPRESSION:

1.  Intermittent confusion.

2.  Resolution of hyponatremia.

3.  Atrial fibrillation.

4.  Dilated cardiomyopathy.

5.  Pulmonary hypertension.



PLAN:  Given these findings, we will continue the patient on his current

medications.  Neurology has been called for evaluation of his confusion.





__________________________________________

Adam Farah MD

 



DD:  03/06/2018 15:05:31

DT:  03/06/2018 15:09:35

Job # [id number]

## 2018-03-06 NOTE — RAD
HISTORY:

RLL PNEUMONIA/PLEURAL EFFUSION  



COMPARISON:

03/01/2018



TECHNIQUE:

Chest PA and lateral



FINDINGS:



LUNGS:

There is a slight increase in the right lower lobe infiltrate.



PLEURA:

No significant pleural effusion identified. No pneumothorax apparent.



CARDIOVASCULAR:

Normal.



OSSEOUS STRUCTURES:

No significant abnormalities.



VISUALIZED UPPER ABDOMEN:

Normal.



OTHER FINDINGS:

None.



IMPRESSION:

There is an increase in the right lower lobe infiltrate

## 2018-03-06 NOTE — PN
DATE:  03/05/2018



SUBJECTIVE:  Patient is seen lying in the bed in room 377, bed 2. 

Overnight nurse's notes were reviewed.



PHYSICAL EXAMINATION:

VITAL SIGNS:  T-max is 98.9 to 98.1, heart rate 79 and 64.  Telemetry shows

atrial fibrillation, heart rate in 70s and 60s.  Blood pressure is 122/75,

135/89, 135/83, 146/83, 134/69, 127/82.  Respiration 18, O2 sat 97%.

HEENT:  Head examination normocephalic, atraumatic.  HEENT examination

shows pink conjunctivae.  Anicteric sclerae.  No oropharyngeal lesion.  No

neck rigidity.

CHEST:  Kyphosis.

LUNGS:  Shows no rales, crackles or wheezing.  Decreased breath sound at

the bases.

CARDIOVASCULAR:  S1, S2, irregular rhythm.  Positive systolic murmur left

sternal border, right second intercostal space, left second intercostal

space.

ABDOMEN:  Soft.  Positive bowel sound.  Tympanic abdomen.

GENITALIA:  Male.

RECTAL:  Examination is deferred.

EXTREMITIES:  Show positive swelling of the ankles noted.  No pitting edema

noted.

MUSCULOSKELETAL:  Examination shows a body mass index of 29.

NEUROLOGIC:  Patient is alert, awake, responsive, is able to move upper and

lower extremity without assistance.  Gait examination could not be tested.



DIAGNOSTICS: On 03/05, WBC 6.1, hemoglobin/hematocrit 12.4 and 37.3,

platelets 173.  Sodium 131, potassium is 4.7, chloride 95, CO2 28, anion

gap 13, BUN 13, creatinine 1.2, GFR greater than 60, glucose 102, calcium

9.3, magnesium 1.8, direct bili 0.9, AST 89, alk phos 171.  Digoxin level

0.4.  Hepatitis A, B, C serologies negative.  MRSA nondetected.  Urine

cultures and blood cultures negative.



Venous Doppler of the lower extremity was negative.  EKG was reviewed; EKG

had atrial fibrillation, baseline artifact.



IMPRESSION AND PLAN:

1.  Severe symptomatic hyponatremia with altered mental status and

confusion.

2.  Hypochloremic hyponatremia.

3.  Hypertension.

4.  Atrial fibrillation.

5.  Severe gait dysfunction.

6.  Normocytic anemia.

7.  Granulocytosis.

8.  Hyperbilirubinemia and transaminitis.

9.  Rhabdomyolysis.

10.  Metastases stage IV prostate carcinoma.

11.  Proteinuria, microscopic hematuria, pyuria, bacteriuria.

12.  Abdominal distention with constipation.

13.  Urinary retention.

14.  L1 vertebral body bony sclerosis suspicious for metastatic disease.

15.  Mid thoracic spine sclerotic lesions.

16.  Right lower lobe consolidation, pneumonia and healthcare-associated

pneumonia and moderate-to-large right pleural effusion.

17.  Deconditioning.

18.  Gait dysfunction.

19.  Advanced stage IV prostate carcinoma.

20.  Cerebral cortical atrophy of the brain.

21.  Probable right basal ganglia, chronic lacunar infarct.

22.  Intracranial atherosclerotic disease.

23.  Questionable lateral ischemic changes on the EKG.

24.  Hypervolemic hyponatremia.

25.  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

metastatic prostate carcinoma.

26.  Hypertensive kidney disease.

27.  Systolic congestive heart failure.

28.  Hypovitaminosis D.

29.  Severe dilated cardiomyopathy.

30.  Constipation.

31.  Dyslipidemia.

32.  Hypomagnesemia.



Plan at this time, patient is to be continued on the above therapeutic

intervention.  Patient has been ordered physical therapy, occupational

therapy, ambulation therapy, gait training.  Patient has been ordered chest

x-ray, repeat, PA and lateral, for the progression of right lower lobe

pneumonia.  Patient has been ordered repeat lab work.  Patient has been

ordered repeat chemistries and serial chemistries are ordered.  Patient was

given a dose of tolvaptan by the nephrologist.



CURRENT CONSULTATIONS:

1.  Cardiology.

2.  Gastroenterology,

3.  Hematology/Oncology.

4.  Nephrology.



Patient's case referred for TCU and social care worker.



CURRENT MEDICATIONS:

1.  Cardizem  mg daily.

2.  Casodex 50 mg daily.

3.  Colace 100 mg three times a day.

4.  Digoxin 0.125 p.o. daily.

5.  Doxycycline 100 mg p.o. q. 12.

6.  Dulcolax suppository 10 mg daily p.r.n.

7.  Ecotrin 81 mg daily.

8.  Eliquis 2.5 twice a day.

9.  Patient is started on Lasix 20 mg IV q. 12 by Nephrology.

10.  Lipitor 40 mg daily.

11.  Lopressor 50 mg twice a day.

12.  Magnesium oxide 800 twice a day.

13.  MiraLax 17 g twice a day.

14.  Morphine 1 g IV q. 4 p.r.n.

15.  Protonix 40 mg daily.

16.  Xopenex nebulizer 0.63 mg every 6 hours round-the-clock and q. 2

p.r.n.

17.  Zyloprim 100 mg daily.



Patient has been ordered repeat chest x-ray, PA and lateral.  Chest PT

ordered.  Patient has been ordered elevation of the lower extremity on two

pillows, out of bed to chair, SILVIANO stockings, SCDs, occupational therapy,

physical therapy ordered.  Patient is seen by the physical therapist.  The

last one was done in 03/03.  Patient has not had any therapy since last 2

to 3 days.  Patient is to be also continued on IV antibiotic.  Patient was

given IV antibiotic 2 g IV daily.  It seems that patient's IV antibiotic

fell off the list, which will be resumed.



Dictated and electronically signed, not read.





__________________________________________

Dale Márquez MD





DD:  03/05/2018 21:37:52

DT:  03/05/2018 22:55:01

Job # [id number]





GABBY

## 2018-03-06 NOTE — CP.PCM.PN
Subjective





- Date & Time of Evaluation


Date of Evaluation: 03/06/18


Time of Evaluation: 10:32





- Subjective


Subjective: 





Nephrology Consultation Note





Assessment: stable


Hypervolemic hyponatremia likely Multifactorial due to CHF, SIADH Caused by 

metastatic prostate CA


Hypertensive Chronic Kidney Disease (I12.9)


Altered MENTAL status: resolved


Chronic Kidney Disease (N18.3) Stage 3 with Cr 1.2 


metastatic prostate cancer, HTN (I12.9)


systolic CHF LVEF 20%


hx of BPH


Vitamin D deficiency





Plan


Na stable for now on oral fluid fluid restriction 1000 mL per day and lasix 20 

mg IV bid. hence continue with same. can change to lasix 40 mg po bid soon


will consider Samsca 30 mg/day if serum Na decreases to <130 despite above 

strategy.





Hypertension control with meds as ordered. unable to tolerate losartan due to 

hyperkalemia episodes.


Monitor Input/Output, daily weights and renal function with basic metabolic 

panel


supplement electrolytes as needed


Avoid nephrotoxins/NSAIDs


Glycemic control


Further work up for as per primary team. CHF management as per cardiology





Thanks for allowing me to participate in care of your patient. Will follow 

patient with you. Please call if any Qs. d/w team. 


Dr Rickey Tapia


Office: [phone] Cell: [phone] Fax: [phone]





Reason for consultation is Hyponatremia


HPI patient is 77-year-old male with history of chronic systolic CHF EF 20% 

also metastatic prostate cancer , BPH, recently seen for hyponatremia due to 

CHF and likely SIADH which was treated with samsca and and diuretic. Discharged 

to nursing home here with the acute change in mentaL status and low sodium of 

115 hence renal consult was requested. Patient at this time Feels better.He 

denies shortness of breath nausea vomiting any pain in his stomach.





Subjective/ROS: Noted events overnight. Patients feels okay. 


Denies chest pain, palpitation,improved shortness of breath, denies leg 

swelling. 


All other negative Except as mentioned in HPI. had bone biopsy by IR on 2/13/18 

Showed metastatic adeno carcinoma of prostate





Physical Examination:      


General Appearance: Comfortable, in no acute respiratory distress, co-operative 

. 


Vitals reviewed and noted as below


Head; Atraumatic, normocephalic


ENT: no ulcers no thrush. Tongue is midline. Oropharynx: no rash or ulcers.


EYES: Pupils are equal, round and reactive to light accommodation. Eye muscles 

and extraocular movement intact. Sclera is anicteric.


Neck; supple no lymphadenopathy, no thyromegaly or bruit


Lungs: Normal respiratory rate/effort. Breath sounds reduced at Rt base


Heart: Normal rate. s1s2 normal. No rub or gallop. 


Extremities: no edema. No varicose veins


Neurological: Patient is alert, awake and oriented to person, place and time. 

No focal deficit. Strength bilateral appropriate and equal


Skin: Warm and dry. Normal turgor. No rash. Palpitation: Normal elasticity for 

age. Feets somewhat cool to touch


Abdomen: Abdomen is soft. Bowel sounds +. There is no abdominal tenderness, no 

guarding/rigidity no organomegaly


Psych: normal insight and normal affect/mood


MSK: no joint tenderness or swelling. Digits and nails normal, no deformity


: kidney or bladder not palpable. Has Fraire catheter in place





Labs/imaging reviewed.


Past medical history, past surgical history, family history, social history, 

allergy reviewed and noted as below


Family hx: no hx of CKD. Rest non-contributor








Objective





- Vital Signs/Intake and Output


Vital Signs (last 24 hours): 


 











Temp Pulse Resp BP Pulse Ox


 


 99.0 F   77   22   129/85   98 


 


 03/06/18 07:30  03/06/18 07:30  03/06/18 07:30  03/06/18 09:43  03/06/18 07:30








Intake and Output: 


 











 03/06/18 03/06/18





 06:59 18:59


 


Intake Total 780 


 


Output Total 1100 


 


Balance -320 














- Medications


Medications: 


 Current Medications





Allopurinol (Zyloprim)  300 mg PO DAILY [facility]


   Last Admin: 03/06/18 09:43 Dose:  300 mg


Apixaban (Eliquis)  2.5 mg PO BID [facility]


   PRN Reason: Protocol


   Last Admin: 03/06/18 09:35 Dose:  2.5 mg


Aspirin (Ecotrin)  81 mg PO DAILY [facility]


   Last Admin: 03/06/18 09:35 Dose:  81 mg


Atorvastatin Calcium (Lipitor)  40 mg PO DIN [facility]


   Last Admin: 03/05/18 18:27 Dose:  40 mg


Bicalutamide (Casodex)  50 mg PO DAILY [facility]


   Last Admin: 03/05/18 11:24 Dose:  50 mg


Bisacodyl (Dulcolax)  10 mg RC DAILY [facility]


   PRN Reason: Constipation


Digoxin (Digoxin)  0.125 mg PO 1400 [facility]


   Last Admin: 03/05/18 15:57 Dose:  0.125 mg


Diltiazem HCl (Cardizem Cd)  240 mg PO DAILY [facility]


   Last Admin: 03/06/18 09:43 Dose:  240 mg


Docusate Sodium (Colace)  100 mg PO TID [facility]


   Last Admin: 03/06/18 09:34 Dose:  100 mg


Doxycycline Hyclate (Doryx)  100 mg PO Q12 [facility]


   PRN Reason: Protocol


   Last Admin: 03/06/18 09:36 Dose:  100 mg


Furosemide (Lasix)  20 mg IVP Q12 [facility]


   Last Admin: 03/06/18 09:34 Dose:  20 mg


Ceftriaxone Sodium (Rocephin 2 Gm Ivpb)  2 gm in 100 mls @ 100 mls/hr IVPB 2200 

[facility]


   PRN Reason: Protocol


Levalbuterol HCl (Xopenex)  0.63 mg IH [id number] [facility]


   Last Admin: 03/06/18 07:26 Dose:  0.63 mg


Levalbuterol HCl (Xopenex)  0.63 mg IH Q2H [facility]N


   PRN Reason: Shortness of Breath


Magnesium Oxide (Mag-Ox)  800 mg PO BID [facility]


   Last Admin: 03/06/18 09:35 Dose:  800 mg


Metoprolol Tartrate (Lopressor)  50 mg PO BID [facility]


   Last Admin: 03/06/18 09:38 Dose:  50 mg


Morphine Sulfate (Morphine)  1 mg IVP Q4H [facility]


   PRN Reason: Pain, moderate (4-7)


   Last Admin: 03/06/18 07:54 Dose:  1 mg


Pantoprazole Sodium (Protonix Ec Tab)  20 mg PO ACBD [facility]


   Last Admin: 03/06/18 09:35 Dose:  20 mg


Polyethylene Glycol (Miralax)  17 gm PO BID [facility]


   Last Admin: 03/06/18 09:36 Dose:  17 gm











- Labs


Labs: 


 





 03/05/18 06:45 





 03/06/18 07:00 





 











PT  18.5 SECONDS (9.4-12.5)  H  03/01/18  04:45    


 


INR  1.59  (0.93-1.08)  H  03/01/18  04:45    


 


APTT  36.5 Seconds (25.1-36.5)   03/01/18  04:45

## 2018-03-07 VITALS — TEMPERATURE: 99 F | HEART RATE: 95 BPM | RESPIRATION RATE: 20 BRPM | OXYGEN SATURATION: 99 %

## 2018-03-07 VITALS — SYSTOLIC BLOOD PRESSURE: 144 MMHG | DIASTOLIC BLOOD PRESSURE: 82 MMHG

## 2018-03-07 VITALS — HEART RATE: 60 BPM

## 2018-03-07 LAB
ALBUMIN SERPL-MCNC: 3 G/DL (ref 3–4.8)
ALBUMIN/GLOB SERPL: 0.9 {RATIO} (ref 1.1–1.8)
ALT SERPL-CCNC: 55 U/L (ref 7–56)
AST SERPL-CCNC: 82 U/L (ref 17–59)
BILIRUB DIRECT SERPL-MCNC: 0.9 MG/DL (ref 0–0.4)
BUN SERPL-MCNC: 14 MG/DL (ref 7–21)
CALCIUM SERPL-MCNC: 9.5 MG/DL (ref 8.4–10.5)
GFR NON-AFRICAN AMERICAN: > 60

## 2018-03-07 PROCEDURE — 0W993ZX DRAINAGE OF RIGHT PLEURAL CAVITY, PERCUTANEOUS APPROACH, DIAGNOSTIC: ICD-10-PCS | Performed by: RADIOLOGY

## 2018-03-07 RX ADMIN — POLYETHYLENE GLYCOL 3350 SCH GM: 17 POWDER, FOR SOLUTION ORAL at 10:12

## 2018-03-07 RX ADMIN — MORPHINE SULFATE PRN MG: 2 INJECTION, SOLUTION INTRAMUSCULAR; INTRAVENOUS at 01:08

## 2018-03-07 RX ADMIN — LEVALBUTEROL SCH: 0.63 SOLUTION RESPIRATORY (INHALATION) at 13:42

## 2018-03-07 RX ADMIN — DILTIAZEM HYDROCHLORIDE SCH MG: 240 CAPSULE, COATED, EXTENDED RELEASE ORAL at 10:12

## 2018-03-07 RX ADMIN — Medication SCH MG: at 10:12

## 2018-03-07 RX ADMIN — DIGOXIN SCH MG: 0.12 TABLET ORAL at 13:22

## 2018-03-07 RX ADMIN — LEVALBUTEROL SCH MG: 0.63 SOLUTION RESPIRATORY (INHALATION) at 01:41

## 2018-03-07 RX ADMIN — LEVALBUTEROL SCH MG: 0.63 SOLUTION RESPIRATORY (INHALATION) at 07:45

## 2018-03-07 RX ADMIN — PANTOPRAZOLE SODIUM SCH MG: 20 TABLET, DELAYED RELEASE ORAL at 13:19

## 2018-03-07 NOTE — PN
DATE:  03/06/2018



SUBJECTIVE:  The patient is seen today in 577, bed 1.  The patient is seen

lying in the bed.  The patient was lethargic, but arousable, appeared to be

confused.  Overnight nurse's notes were reviewed..  The patient overnight

was comfortable according to the nurses' notes.  The patient watched TV

overnight.



PHYSICAL EXAMINATION:

VITAL SIGNS:  T-max is 98.0-99.0; heart rate 73, 77, atrial fibrillation;

blood pressure in the last 24 hours, 127/82, 129/85, 129/85, 114/74;

respiration 20; O2 sat %.  Intake/output:  Output in the last 24

hours is noted to be 1300, +200, +900, +300.  Total of 2700 mL.

HEENT:  Head examination normocephalic, atraumatic.  HEENT examination

shows pinkish conjunctivae.  Anicteric sclerae.  No oropharyngeal lesion. 

No neck rigidity.  Questionable jugular venous distention.

CHEST:  Kyphosis.

LUNG:  Examination shows decreased breath sound at the bases, left more

than the right.

CARDIOVASCULAR:  S1, S2.  Irregular rhythm.  Positive systolic murmur, left

sternal border, right second intercostal space.

ABDOMEN:  Soft, protuberant.  Positive bowel sounds.  No hepatosplenomegaly

appreciated.  No guarding.  No rigidity.  No rebound tenderness.

GENITALIA:  Male.

RECTAL:  Deferred.

EXTREMITY:  Shows positive ankle swelling and trace swelling and pitting

edema of the lower extremity.  Positive SCDs.

MUSCULOSKELETAL:  Shows a body mass index of 29.3.

NEUROLOGIC:  The patient is arousable, awake, responsive, lethargic and

questionable confused.  The patient is slow in responding and moving upper

lower extremity.



DIAGNOSTICS:  From today is sodium 135, potassium 4.2, chloride 96, CO2 of

31, anion gap 12, BUN 14, creatinine 1.2, GFR greater than 60, glucose 120,

calcium 9.1 with total bili 0.7, AST 84, alk phos 166, total protein 6.2,

albumin 3.0.  Hepatitis serologies are negative.  MRSA urine blood cultures

negative.



The patient was sent for a stat MRI for evaluation of altered mental status

and to rule out brain mets.  An MRI of the brain was negative for any

metastasis or acute stroke.  Only shows moderate cerebral cortical atrophy.

The patient also had a chest x-ray done for evaluation of congestive heart

failure and the right lower lobe pneumonia and pleural effusion.  The

patient's repeat chest x-ray was noted.  MRI of the brain was noted.



IMPRESSION AND PLAN:

1.  Questionable altered mental status with episodic confusion and

lethargy, etiology undetermined.

2.  Stage IV metastatic adenocarcinoma of the prostate with metastasis to

bone.

3.  Atrial fibrillation.

4.  Anemia.

5.  Granulocytosis.

6.  Cerebral cortical atrophy of the brain.

7.  Increasing right lower lobe infiltrate versus right-sided effusion.

8.  Slow resolving hyponatremia secondary to syndrome of inappropriate

antidiuretic hormone.

9.  Transaminitis.

10.  Severe gait dysfunction and deconditioning.

11.  Bilateral lower extremity venous stasis.

12.  End-stage dilated cardiomyopathy with left ventricular ejection

fraction of 20%.

13.  Rhabdomyolysis.

14.  Severe transaminitis.

15.  Proteinuria.

16.  Microscopic hematuria.

17.  Pyuria.

18.  Bacteriuria.

19.  History of hypertension.

20.  Atrial fibrillation with questionable lateral ischemic changes on the

EKG.

21.  Slow resolving hyponatremia.

22.  Dilated cardiomyopathy.

23.  Hypovolemia hyponatremia secondary to congestive heart failure and

syndrome of inappropriate antidiuretic hormone secondary to stage IV

metastases adenocarcinoma of the prostate.

24.  Hypertensive chronic kidney disease.

25.  Systolic congestive heart failure with ejection fraction of 20%.



Plan at this time, we have requested a Neurology consultation for

evaluation of the patient's confusion state.  Neurology consultation

requested.  The patient has also been requested to be evaluated by

Interventional Radiology for possible evaluation of the right-sided pleural

effusion.  The patient has been ordered repeat chemistry, digoxin level,

LFT, magnesium.



CONSULTATION:

1.  Cardiology.

2.  Gastroenterology.

3.  Hematology/Oncology.

4.  Nephrology.

5.  Neurology.

6.  Interventional Radiology.



The patient's case is referred to  upon completion of the

acute care hospitalization.  The patient was seen by the physical

therapist.  Their recommendation is to continue PT and subacute rehab.  The

patient's current medications:  Cardizem  mg daily, Casodex 50 mg

daily, Colace 100 mg three times a day, digoxin 0.125 daily, doxycycline

100 mg p.o. q. 12, Dulcolax suppository 10 mg p.r.n. daily, Ecotrin 81 mg

daily, Eliquis 2.5 twice a day.  The patient is on Lasix 20 mg IV q. 12,

Lipitor 40 mg daily, Lopressor 50 mg twice a day, magnesium oxide 800 twice

a day, MiraLax 17 g twice a day, morphine 1 mg IV q. 4 p.r.n., which may be

cause of the confusion, which will be considered for stopping, Protonix 40

mg daily, Rocephin 2 g IV daily.  The patient is on doxycycline 100 p.o. q.

12.  The patient is on Xopenex nebulizer 0.63 mg every 6 hours

around-the-clock q. 2 hours p.r.n., allopurinol 300 mg daily.  Chest PT,

heart-healthy diet, out of bed to chair, SILVIANO stockings, SCDs, physical

therapy ordered.  Once the patient is cleared by Neurology and

Interventional Radiology, the patient will be considered for discharge back

to subacute rehab.



The patient's son, Claude has been explained about the patient's overall

guarded to poor prognosis secondary to advanced catastrophic prostate

carcinoma, which he acknowledged and understand.



The patient's condition and prognosis is poor.



Dictated and electronically signed, not read.





__________________________________________

Dale Márquez MD





DD:  03/06/2018 20:53:12

DT:  03/06/2018 21:01:58

Job # [id number]

## 2018-03-07 NOTE — CP.PCM.PN
Subjective





- Date & Time of Evaluation


Date of Evaluation: 03/07/18


Time of Evaluation: 12:30





- Subjective


Subjective: 





No complaints, tolerated thoracentesis well.





Objective





- Vital Signs/Intake and Output


Vital Signs (last 24 hours): 


 











Temp Pulse Resp BP Pulse Ox


 


 99 F   95 H  20   144/82   99 


 


 03/07/18 08:34  03/07/18 08:34  03/07/18 08:34  03/07/18 10:12  03/07/18 08:34








Intake and Output: 


 











 03/07/18 03/07/18





 06:59 18:59


 


Intake Total 480 


 


Output Total 1400 


 


Balance -920 














- Medications


Medications: 


 Current Medications





Allopurinol (Zyloprim)  300 mg PO DAILY [facility]


   Last Admin: 03/07/18 10:11 Dose:  300 mg


Apixaban (Eliquis)  2.5 mg PO BID [facility]


   PRN Reason: Protocol


   Last Admin: 03/07/18 10:12 Dose:  2.5 mg


Aspirin (Ecotrin)  81 mg PO DAILY [facility]


   Last Admin: 03/07/18 10:11 Dose:  81 mg


Atorvastatin Calcium (Lipitor)  40 mg PO DIN [facility]


   Last Admin: 03/06/18 17:51 Dose:  40 mg


Bicalutamide (Casodex)  50 mg PO DAILY [facility]


   Last Admin: 03/06/18 12:55 Dose:  Not Given


Bisacodyl (Dulcolax)  10 mg RC DAILY PRN


   PRN Reason: Constipation


Digoxin (Digoxin)  0.125 mg PO 1400 [facility]


   Last Admin: 03/06/18 13:00 Dose:  0.125 mg


Diltiazem HCl (Cardizem Cd)  240 mg PO DAILY [facility]


   Last Admin: 03/07/18 10:12 Dose:  240 mg


Docusate Sodium (Colace)  100 mg PO TID [facility]


   Last Admin: 03/07/18 10:12 Dose:  100 mg


Furosemide (Lasix)  20 mg IVP Q12 [facility]


   Last Admin: 03/07/18 10:12 Dose:  20 mg


Ceftriaxone Sodium (Rocephin 2 Gm Ivpb)  2 gm in 100 mls @ 100 mls/hr IVPB 2200 

[facility]


   PRN Reason: Protocol


   Last Admin: 03/06/18 22:16 Dose:  100 mls/hr


Levalbuterol HCl (Xopenex)  0.63 mg IH [id number] [facility]


   Last Admin: 03/07/18 07:45 Dose:  0.63 mg


Levalbuterol HCl (Xopenex)  0.63 mg IH Q2H PRN


   PRN Reason: Shortness of Breath


Magnesium Oxide (Mag-Ox)  800 mg PO BID [facility]


   Last Admin: 03/07/18 10:12 Dose:  800 mg


Metoprolol Tartrate (Lopressor)  50 mg PO BID [facility]


   Last Admin: 03/07/18 10:12 Dose:  50 mg


Pantoprazole Sodium (Protonix Ec Tab)  20 mg PO ACBD [facility]


   Last Admin: 03/06/18 17:54 Dose:  20 mg


Polyethylene Glycol (Miralax)  17 gm PO BID [facility]


   Last Admin: 03/07/18 10:12 Dose:  17 gm











- Labs


Labs: 


 





 03/05/18 06:45 





 03/07/18 06:30 





 











PT  18.5 SECONDS (9.4-12.5)  H  03/01/18  04:45    


 


INR  1.59  (0.93-1.08)  H  03/01/18  04:45    


 


APTT  36.5 Seconds (25.1-36.5)   03/01/18  04:45    














- Head Exam


Head Exam: ATRAUMATIC





- Eye Exam


Eye Exam: Normal appearance





- ENT Exam


ENT Exam: Mucous Membranes Dry





- Respiratory Exam


Respiratory Exam: NORMAL BREATHING PATTERN





- Cardiovascular Exam


Cardiovascular Exam: +S1, +S2





- GI/Abdominal Exam


GI & Abdominal Exam: Normal Bowel Sounds





- Extremities Exam


Extremities Exam: Pedal Edema





Assessment and Plan


(1) Anemia


Assessment & Plan: 


chronic disease and bone metastasis


Status: Acute   





(2) Prostate cancer


Assessment & Plan: 


stage IV


bone metastasis


on peripheral androgen blockade


outpatient Lupron


Status: Acute

## 2018-03-07 NOTE — US
PROCEDURE:  Ultrasound guided right thoracentesis.



CLINICAL HISTORY:  Stage IV prostate CA. New right pleural effusion. 

Thickness of breath.  Evaluate for malignancy. 



PHYSICIAN(S):  Adam Shine MD.



TECHNIQUE:

The relative risks and indications of the procedure were explained to 

the patient and consent obtained. The patient was placed in a sitting 

position on the stretcher and sonography of the right chest 

performed. This revealed a small rightpleural effusion. 



A right posterolateral intercostal approach was selected and the area 

prepped and draped usual sterile fashion. 1% Xylocaine was used to 

anesthetize the skin and soft tissues. A 7 Korean thoracentesis 

catheter was trocared into the right pleural cavity and 700of dyan 

fluid aspirated. A cytology specimen was sent 



IMPRESSION:

1. Ultrasound guided right thoracentesis. 700 cc of amberfluid were 

aspirated. A cytology specimen was sent

## 2018-03-07 NOTE — CP.PCM.PN
Subjective





- Date & Time of Evaluation


Date of Evaluation: 03/07/18


Time of Evaluation: 12:10





- Subjective


Subjective: 





Nephrology Consultation Note





Assessment: stable


Hypervolemic hyponatremia likely Multifactorial due to CHF, SIADH Caused by 

metastatic prostate CA


Hypertensive Chronic Kidney Disease (I12.9)


Altered MENTAL status: improved


Chronic Kidney Disease (N18.3) Stage 3 with Cr 1.2 


metastatic prostate cancer, HTN (I12.9)


systolic CHF LVEF 20%


hx of BPH


Vitamin D deficiency





Plan


Na stable for now on oral fluid fluid restriction 1000 mL per day and lasix 20 

mg IV bid. hence continue with same. can change to lasix 40 mg po bid at d/c


will consider Samsca 30 mg/day if serum Na decreases to <130 despite above 

strategy.





Hypertension control with meds as ordered. unable to tolerate losartan due to 

hyperkalemia episodes.


Monitor Input/Output, daily weights and renal function with basic metabolic 

panel


supplement electrolytes as needed


Avoid nephrotoxins/NSAIDs


Glycemic control


Further work up for as per primary team. CHF management as per cardiology


pt stable for d/c from renal perspective when planned. f/up in renal office 1-2 

weeks post d/c





Thanks for allowing me to participate in care of your patient. Will follow 

patient with you. Please call if any Qs. d/w team. 


Dr Rickey Tapia


Office: [phone] Cell: [phone] Fax: [phone]





Reason for consultation is Hyponatremia


HPI patient is 77-year-old male with history of chronic systolic CHF EF 20% 

also metastatic prostate cancer , BPH, recently seen for hyponatremia due to 

CHF and likely SIADH which was treated with samsca and and diuretic. Discharged 

to nursing home here with the acute change in mentaL status and low sodium of 

115 hence renal consult was requested. Patient at this time Feels better.He 

denies shortness of breath nausea vomiting any pain in his stomach.





Subjective/ROS: Noted events overnight. Patients feels okay. 


Denies chest pain, palpitation,improved shortness of breath, denies leg 

swelling. 


All other negative Except as mentioned in HPI. had bone biopsy by IR on 2/13/18 

Showed metastatic adeno carcinoma of prostate


s/p Rt thoracocentesis 3/7/18 ~ 700 mL fluid aspirated





Physical Examination:      


General Appearance: Comfortable, in no acute respiratory distress, co-operative 

. 


Vitals reviewed and noted as below


Head; Atraumatic, normocephalic


ENT: no ulcers no thrush. Tongue is midline. Oropharynx: no rash or ulcers.


EYES: Pupils are equal, round and reactive to light accommodation. Eye muscles 

and extraocular movement intact. Sclera is anicteric.


Neck; supple no lymphadenopathy, no thyromegaly or bruit


Lungs: Normal respiratory rate/effort. Breath sounds reduced at Rt base


Heart: Normal rate. s1s2 normal. No rub or gallop. 


Extremities: no edema. No varicose veins


Neurological: Patient is alert, awake and oriented to person, place and time. 

No focal deficit. Strength bilateral appropriate and equal


Skin: Warm and dry. Normal turgor. No rash. Palpitation: Normal elasticity for 

age. Feets somewhat cool to touch


Abdomen: Abdomen is soft. Bowel sounds +. There is no abdominal tenderness, no 

guarding/rigidity no organomegaly


Psych: normal insight and normal affect/mood


MSK: no joint tenderness or swelling. Digits and nails normal, no deformity


: kidney or bladder not palpable. Has Fraire catheter in place





Labs/imaging reviewed.


Past medical history, past surgical history, family history, social history, 

allergy reviewed and noted as below


Family hx: no hx of CKD. Rest non-contributor





Objective





- Vital Signs/Intake and Output


Vital Signs (last 24 hours): 


 











Temp Pulse Resp BP Pulse Ox


 


 99 F   95 H  20   144/82   99 


 


 03/07/18 08:34  03/07/18 08:34  03/07/18 08:34  03/07/18 10:12  03/07/18 08:34








Intake and Output: 


 











 03/07/18 03/07/18





 06:59 18:59


 


Intake Total 480 


 


Output Total 1400 


 


Balance -920 














- Medications


Medications: 


 Current Medications





Allopurinol (Zyloprim)  300 mg PO DAILY [facility]


   Last Admin: 03/07/18 10:11 Dose:  300 mg


Apixaban (Eliquis)  2.5 mg PO BID [facility]


   PRN Reason: Protocol


   Last Admin: 03/07/18 10:12 Dose:  2.5 mg


Aspirin (Ecotrin)  81 mg PO DAILY [facility]


   Last Admin: 03/07/18 10:11 Dose:  81 mg


Atorvastatin Calcium (Lipitor)  40 mg PO DIN [facility]


   Last Admin: 03/06/18 17:51 Dose:  40 mg


Bicalutamide (Casodex)  50 mg PO DAILY [facility]


   Last Admin: 03/06/18 12:55 Dose:  Not Given


Bisacodyl (Dulcolax)  10 mg RC DAILY [facility]N


   PRN Reason: Constipation


Digoxin (Digoxin)  0.125 mg PO 1400 [facility]


   Last Admin: 03/06/18 13:00 Dose:  0.125 mg


Diltiazem HCl (Cardizem Cd)  240 mg PO DAILY [facility]


   Last Admin: 03/07/18 10:12 Dose:  240 mg


Docusate Sodium (Colace)  100 mg PO TID [facility]


   Last Admin: 03/07/18 10:12 Dose:  100 mg


Furosemide (Lasix)  20 mg IVP Q12 [facility]


   Last Admin: 03/07/18 10:12 Dose:  20 mg


Ceftriaxone Sodium (Rocephin 2 Gm Ivpb)  2 gm in 100 mls @ 100 mls/hr IVPB 2200 

[facility]


   PRN Reason: Protocol


   Last Admin: 03/06/18 22:16 Dose:  100 mls/hr


Levalbuterol HCl (Xopenex)  0.63 mg IH [id number] [facility]


   Last Admin: 03/07/18 07:45 Dose:  0.63 mg


Levalbuterol HCl (Xopenex)  0.63 mg IH Q2H PRN


   PRN Reason: Shortness of Breath


Magnesium Oxide (Mag-Ox)  800 mg PO BID [facility]


   Last Admin: 03/07/18 10:12 Dose:  800 mg


Metoprolol Tartrate (Lopressor)  50 mg PO BID [facility]


   Last Admin: 03/07/18 10:12 Dose:  50 mg


Pantoprazole Sodium (Protonix Ec Tab)  20 mg PO ACBD [facility]


   Last Admin: 03/06/18 17:54 Dose:  20 mg


Polyethylene Glycol (Miralax)  17 gm PO BID [facility]


   Last Admin: 03/07/18 10:12 Dose:  17 gm











- Labs


Labs: 


 





 03/05/18 06:45 





 03/07/18 06:30 





 











PT  18.5 SECONDS (9.4-12.5)  H  03/01/18  04:45    


 


INR  1.59  (0.93-1.08)  H  03/01/18  04:45    


 


APTT  36.5 Seconds (25.1-36.5)   03/01/18  04:45

## 2018-03-07 NOTE — RAD
HISTORY:

rt thora  



COMPARISON:

03/06/2018



TECHNIQUE:

Chest PA and lateral



FINDINGS:



LUNGS:

No active pulmonary disease.



PLEURA:

There has been a right-sided thoracentesis.  There is no 

pneumothorax.  The infiltrate and effusion have decreased



CARDIOVASCULAR:

Normal.



OSSEOUS STRUCTURES:

No significant abnormalities.



VISUALIZED UPPER ABDOMEN:

Normal.



OTHER FINDINGS:

None.



IMPRESSION:

There has been a right-sided thoracentesis.  There is no 

pneumothorax.  The infiltrate and effusion have decreased

## 2018-03-08 NOTE — DS
HISTORY OF PRESENT ILLNESS:  The patient is seen in room 577, bed 2.  The

patient is lying in the bed having breakfast.  The patient is much more

awake and responsive today.  The patient appears to be alert, awake,

oriented x3, much more awake and responsive today.  Overnight nurse's notes

were reviewed.  The patient slept well.



PHYSICAL EXAMINATION:

VITAL SIGNS:  T-max 98.1 to 99, pulse 80 to 95; blood pressure 144/82,

143/85, 117/71; respiration 20, O2 sat 99 to 100%.  Intake output is _____

in the last 24 hours.

HEENT:  Head examination normocephalic, atraumatic.  HEENT examination

shows pink conjunctivae.  Anicteric sclerae.  No oropharyngeal lesion.  No

neck rigidity.

CHEST:  Kyphosis.

LUNGS:  Shows decreased breath sound at the right base.

CARDIOVASCULAR:  S1 and S2, irregular rhythm.  Positive systolic murmur,

left sternal border, _____ right second intercostal space _____.

ABDOMEN:  Distended.  Positive bowel sound.  No guarding.  No rigidity.  No

rebound tenderness.  No hepatosplenomegaly noted.

GENITALIA:  Male.

RECTAL:  Examination is deferred.

EXTREMITY:  Shows complete resolution of the ankle swelling and pitting

edema of the lower extremity

MUSCULOSKELETAL:  Examination shows a body mass index of 29.3.

NEUROLOGIC:  The patient is alert, awake, oriented x3 today.  Cranial

nerves II through XII limited.  Gait examination is not tested.

VASCULAR:  Palpable pulses.



DIAGNOSTICS:  From 03/07/2018, sodium 135, potassium 4.2, chloride 96, CO2

of 30, anion gap 13, BUN 14, creatinine 1.1, GFR greater than 60, glucose

95, calcium 9.5, magnesium 1.9.  LFTs are total bili 0.9, AST 82, alk phos

166.  MRSA urine culture, blood cultures negative.



The patient was seen by the physical therapist.  Recommendations, subacute

rehab, discharge options.  The patient's case was referred to Social

Services.  The patient's case referred for discharge planning to subacute

rehab, which is authorized.



FINAL IMPRESSION, PLAN AND DISCHARGE DIAGNOSES:

1.  Altered mental status (resolved).

2.  Severe gait dysfunction.

3.  Stage IV adenocarcinoma of the prostate with bone metastasis.

4.  Severe hyponatremia secondary to syndrome of inappropriate antidiuretic

hormone secondary to metastatic prostate carcinoma.

5.  Atrial fibrillation with rapid ventricle response.

6.  Normocytic anemia.

7.  Granulocytosis.

8.  Recurrent slow resolving hyponatremia.

9.  Hyperkalemia.

10.  Transaminitis.

11.  Mild hyperbilirubinemia.

12.  Mild rhabdomyolysis.

13.  Elevated prostate specific antigen of greater than 50.

14.  Proteinuria, microscopic hematuria, pyuria, bacteriuria.

15.  Hypervolemic hyponatremia secondary to congestive heart failure,

syndrome of inappropriate antidiuretic hormone secondary to metastatic

adenocarcinoma of the prostate.

16.  Hypertensive cardiovascular disease.

17.  Chronic kidney disease stage III.

18.  Dilated cardiomyopathy with ejection fraction of 20%.

19. Hypovitaminosis D.

20. Severe gait dysfunction and deconditioning.

21.  Constipation.

22.  Hyperlipidemia.

23.  Hypomagnesemia.

24.  Hyperuricemia.



The patient has been seen by .  The patient's authorization

for subacute rehab has been completed.  The patient will be considered for

a possible discharge to subacute rehab after thoracentesis and after

cleared by Dr. Adam Shine.



Discharge followup with Dr. Márquez and Dr. Wolf within 1 week after

discharge from subacute rehab.



The patient's overall medical condition, diagnosis explained to the patient

and the patient's son, Claude Gonzalez, at length on multiple occasions.  I

have explained to the patient's son that the patient's overall prognosis is

very guarded to poor, which he acknowledged to understand.  The patient's

son will be also notified about updates about the patient's condition.



The patient is scheduled for thoracentesis today by Dr. Adam Shine.  The

patient is on chest PT q. 6 hours. out of bed, etc.



The patient will be also discharged on doxycycline 100 mg twice a day for 7

more days.  The patient has already completed 6 days of IV antibiotics in

the hospital.



FINAL DISCHARGE MEDICATIONS:  The patient's final discharge medications

will be as follows:

1.  Allopurinol 300 mg daily.

2.  Eliquis 2.5 twice a day.

3.  Ecotrin 81 mg daily.

4.  Lipitor 40 mg daily.

5.  Casodex 50 mg daily.

6.  Dulcolax 10 mg suppository p.r.n.

7.  Digoxin 0.125 mg daily.

8.  Cardizem  mg daily.

9.  Colace 100 mg three times a day.

10.  Doxycycline 100 mg twice a day for 7 days.

11.  Lasix 40 mg daily.

12.  Xopenex 0.63 mg every 6 hours round the clock, q. 2 hours p.r.n.

13.  Magnesium oxide 800 mg twice a day.

14.  Lopressor 50 mg twice a day.

15.  Zofran 4 mg q. 4 p.r.n.

16.  Protonix 40 mg daily.

17.  MiraLax 17 g twice a day.



The patient will be discharged back to subacute rehab.



Time spent in the entire discharge process is more than 45 minutes.



Dictated and electronically signed, not read.





__________________________________________

Dale Márquez MD



DD:  03/07/2018 10:33:56

DT:  03/07/2018 10:37:50

Job # [id number]

## 2018-03-08 NOTE — OP
PROCEDURE DATE:  02/14/2018



UROLOGY OPERATIVE REPORT



PREOPERATIVE DIAGNOSES:  Urinary retention, voiding dysfunction, hematuria,

and elevated prostate-specific antigen.



POSTOPERATIVE DIAGNOSES:  Urinary retention, voiding dysfunction,

hematuria, and elevated prostate-specific antigen.



PROCEDURE:  Cystoscopy only.



COMPLICATIONS:  There were no complications.



BLOOD LOSS:  Less than 10 mL.



INDICATIONS:  See the history and physical for further details and

consultation.  A very pleasant gentleman, looks like he is _____ with

metastatic prostate cancer.  He does not have a previous history of known

prostate cancer.  He has a bone lesion.  I had a chance to speak to the

radiologist.  I discussed options with the patient and my recommendation is

he undergo a cystoscopy for the gross hematuria, regarding the possibility

of a prostate biopsy because he has an elevated PSA.  He had a bone biopsy

just a day ago.



Because the pathology is not yet back, I do not want to make the patient

undergo a prostate biopsy.  If the bone biopsy shows prostate cancer, then

we are not going to do a prostate biopsy, but if it does not show prostate

cancer, then we are going to consider it.  Meanwhile, he has gotten

abnormal PSA, he has got a bone lesion.



But, he needs a cystoscopic evaluation for the presence of gross hematuria

to rule out any other bladder lesions and after evaluation and

recommendation, discussing options, he is here for the above-listed

procedure.  So, again the procedure is cystoscopy.



INDICATION:  See history and physical in detail.  This is a pleasant

gentleman.



DESCRIPTION OF PROCEDURE:  Patient was brought to the table.  Routine

monitors were placed.  Time-out was called to confirm patient positioning,

etc.  Patient also was given antibiotics.  We introduced the cystoscope via

urethra and the bladder was inspected carefully.  _____ about 3 to 4 cm of

fair large prostate.



No other real abnormality is detected.



The bladder was inspected carefully, there is no real definite bladder

lesions, redness, erythema.



At the termination of procedure, patient tolerated without any

complication.



ADDENDUM:  He had a bone biopsy; if that bone biopsy shows prostate cancer,

there is no reason to do a second prostate biopsy.  However, if the bone

biopsy is negative, then we are going to consider a prostate biopsy and

then we will have to discuss that if that is positive, how to manage.



We have to discuss it at length.



In the meantime, Mr. Green is stable, having tolerated the cystoscopic

evaluation well.



So, the preop diagnoses is hematuria and urinary retention, voiding

dysfunction, and prostate cancer, and the postop is same.





__________________________________________

Kevin Gray MD





DD:  03/07/2018 19:24:41

DT:  03/07/2018 22:13:16

Job # [id number]





GABBY

## 2018-03-15 ENCOUNTER — HOSPITAL ENCOUNTER (INPATIENT)
Dept: HOSPITAL 42 - ED | Age: 78
LOS: 5 days | Discharge: HOSPICE | DRG: 300 | End: 2018-03-20
Attending: INTERNAL MEDICINE | Admitting: INTERNAL MEDICINE
Payer: MEDICARE

## 2018-03-15 VITALS — BODY MASS INDEX: 29.2 KG/M2

## 2018-03-15 DIAGNOSIS — K59.00: ICD-10-CM

## 2018-03-15 DIAGNOSIS — Z86.73: ICD-10-CM

## 2018-03-15 DIAGNOSIS — Z79.82: ICD-10-CM

## 2018-03-15 DIAGNOSIS — Z51.5: ICD-10-CM

## 2018-03-15 DIAGNOSIS — E88.09: ICD-10-CM

## 2018-03-15 DIAGNOSIS — I67.2: ICD-10-CM

## 2018-03-15 DIAGNOSIS — L97.519: ICD-10-CM

## 2018-03-15 DIAGNOSIS — Z91.19: ICD-10-CM

## 2018-03-15 DIAGNOSIS — Z79.899: ICD-10-CM

## 2018-03-15 DIAGNOSIS — E78.5: ICD-10-CM

## 2018-03-15 DIAGNOSIS — F03.90: ICD-10-CM

## 2018-03-15 DIAGNOSIS — I13.0: ICD-10-CM

## 2018-03-15 DIAGNOSIS — E22.2: ICD-10-CM

## 2018-03-15 DIAGNOSIS — N18.3: ICD-10-CM

## 2018-03-15 DIAGNOSIS — I27.21: ICD-10-CM

## 2018-03-15 DIAGNOSIS — Z87.891: ICD-10-CM

## 2018-03-15 DIAGNOSIS — C61: ICD-10-CM

## 2018-03-15 DIAGNOSIS — I70.209: Primary | ICD-10-CM

## 2018-03-15 DIAGNOSIS — I08.1: ICD-10-CM

## 2018-03-15 DIAGNOSIS — M86.9: ICD-10-CM

## 2018-03-15 DIAGNOSIS — I87.8: ICD-10-CM

## 2018-03-15 DIAGNOSIS — I48.91: ICD-10-CM

## 2018-03-15 DIAGNOSIS — I50.22: ICD-10-CM

## 2018-03-15 DIAGNOSIS — C79.51: ICD-10-CM

## 2018-03-15 DIAGNOSIS — Z79.01: ICD-10-CM

## 2018-03-15 DIAGNOSIS — M81.0: ICD-10-CM

## 2018-03-15 DIAGNOSIS — R17: ICD-10-CM

## 2018-03-15 DIAGNOSIS — J98.11: ICD-10-CM

## 2018-03-15 DIAGNOSIS — Z66: ICD-10-CM

## 2018-03-15 DIAGNOSIS — D64.9: ICD-10-CM

## 2018-03-15 DIAGNOSIS — E83.42: ICD-10-CM

## 2018-03-15 DIAGNOSIS — I42.0: ICD-10-CM

## 2018-03-15 DIAGNOSIS — I96: ICD-10-CM

## 2018-03-15 LAB
ALBUMIN SERPL-MCNC: 3.5 G/DL (ref 3–4.8)
ALBUMIN/GLOB SERPL: 0.9 {RATIO} (ref 1.1–1.8)
ALT SERPL-CCNC: 56 U/L (ref 7–56)
APTT BLD: 37.6 SECONDS (ref 25.1–36.5)
AST SERPL-CCNC: 71 U/L (ref 17–59)
BASOPHILS # BLD AUTO: 0.01 K/MM3 (ref 0–2)
BASOPHILS NFR BLD: 0.1 % (ref 0–3)
BUN SERPL-MCNC: 25 MG/DL (ref 7–21)
CALCIUM SERPL-MCNC: 9.5 MG/DL (ref 8.4–10.5)
EOSINOPHIL # BLD: 0 10*3/UL (ref 0–0.7)
EOSINOPHIL NFR BLD: 0.4 % (ref 1.5–5)
ERYTHROCYTE [DISTWIDTH] IN BLOOD BY AUTOMATED COUNT: 15.4 % (ref 11.5–14.5)
GFR NON-AFRICAN AMERICAN: > 60
GRANULOCYTES # BLD: 4.75 10*3/UL (ref 1.4–6.5)
GRANULOCYTES NFR BLD: 57.7 % (ref 50–68)
HGB BLD-MCNC: 14.1 G/DL (ref 14–18)
INR PPP: 1.58 (ref 0.93–1.08)
LYMPHOCYTES # BLD: 2.3 10*3/UL (ref 1.2–3.4)
LYMPHOCYTES NFR BLD AUTO: 27.8 % (ref 22–35)
MCH RBC QN AUTO: 28 PG (ref 25–35)
MCHC RBC AUTO-ENTMCNC: 34.3 G/DL (ref 31–37)
MCV RBC AUTO: 81.5 FL (ref 80–105)
MONOCYTES # BLD AUTO: 1.2 10*3/UL (ref 0.1–0.6)
MONOCYTES NFR BLD: 14 % (ref 1–6)
PLATELET # BLD: 274 10^3/UL (ref 120–450)
PMV BLD AUTO: 9.8 FL (ref 7–11)
PROTHROMBIN TIME: 18.2 SECONDS (ref 9.4–12.5)
RBC # BLD AUTO: 5.04 10^6/UL (ref 3.5–6.1)
WBC # BLD AUTO: 8.2 10^3/UL (ref 4.5–11)

## 2018-03-15 NOTE — US
PROCEDURE:  Lower extremity VINCENZO exam



HISTORY:

Peripheral vascular disease with ischemic pain 



PHYSICIAN(S):  Adam Shine MD.



FINDINGS:

The resting VINCENZO's are severely abnormal and not obtainable. 



The brachial systolic pressures are symmetric.



There is a 61 mm gradient between the arm and right low thigh 

pressure.  There is a 25 mm gradient between the arm and left low 

thigh pressure. The findings are consistent with aortoiliac disease, 

greater on the right than the left. 



The calf PVR waveforms are severely blunted bilaterally, consistent 

with bilateral SFA disease 



The ankle and metatarsal waveforms are essentially flat. 



IMPRESSION:

1. Severely abnormal resting VINCENZO on these better not obtainable 



2. Aortoiliac disease, greater on the right than the left 



3. Bilateral SFA disease. 



4. The distal ankle and metatarsal waveforms are essentially flat

## 2018-03-15 NOTE — ED PDOC
Arrival/HPI





- General


Chief Complaint: Lower Extremity Problem/Injury


Time Seen by Provider: 03/15/18 14:15


Historian: Patient, Nursing Home





- History of Present Illness


Narrative History of Present Illness (Text): 


03/15/18 14:31


A 77 year old male sent into the emergency department from nursing home for 

evaluation of right foot. See Dr. Alcantara most recent discharge for further 

information as patient has complicated medical history with hx of dilated 

cardiomyopathy with EF:20% and afib on eliquis and stage 4 prostate cancer.  As 

per Nursing home report, patients foot is pulse-less and cold. Patient 

complains of right foot pain with palpation. He is unable to provide any 

further details. Patient denies any fever, chills, nausea, vomiting, abdominal 

pain, chest pain, shortness of breath or any other complaints. 





03/15/18 16:19





03/15/18 18:35








Past Medical History





- Provider Review


Nursing Documentation Reviewed: Yes





- Infectious Disease


Hx of Infectious Diseases: None





- Tetanus Immunization


Tetanus Immunization: Unknown





- Past Medical History


Past Medical History: No Previous





- Cardiac


Hx Cardiac Disorders: Yes (Afib)


Hx Atrial Fibrillation: Yes


Hx Congestive Heart Failure: Yes


Hx Hypertension: Yes





- Pulmonary


Hx Respiratory Disorders: No





- Neurological


Hx Neurological Disorder: No





- HEENT


Hx HEENT Disorder: No





- Renal


Hx Renal Disorder: No





- Endocrine/Metabolic


Hx Endocrine Disorders: No





- Hematological/Oncological


Hx Blood Disorders: No





- Integumentary


Hx Dermatological Disorder: No





- Musculoskeletal/Rheumatological


Hx Musculoskeletal Disorders: Yes


Hx Unsteady Gait: Yes





- Gastrointestinal


Hx Gastrointestinal Disorders: No





- Genitourinary/Gynecological


Hx Genitourinary Disorders: No


Hx Prostate Cancer: Yes





- Psychiatric


Hx Depression: No


Hx Emotional Abuse: No


Hx Physical Abuse: No


Hx Substance Use: No





- Past Surgical History


Past Surgical History: No Previous





- Anesthesia


Hx Anesthesia: No





- Suicidal Assessment


Feels Threatened In Home Enviroment: No





Family/Social History





- Physician Review


Nursing Documentation Reviewed: Yes


Family/Social History: No Known Family HX


Smoking Status: Never Smoked


Hx Alcohol Use: No


Hx Substance Use: No





Allergies/Home Meds


Allergies/Adverse Reactions: 


Allergies





No Known Allergies Allergy (Verified 01/28/14 15:34)


 








Home Medications: 


 Home Meds











 Medication  Instructions  Recorded  Confirmed


 


Digoxin 0.125 mg PO DAILY 03/01/18 03/15/18


 


Ondansetron [Zofran Tab] 4 mg PO Q4 PRN 03/01/18 03/15/18


 


Pantoprazole [Protonix EC Tab] 20 mg PO DAILY 03/01/18 03/15/18


 


Acetaminophen [Tylenol 325mg tab] 325 mg PO PRN PRN 03/15/18 03/15/18


 


Acetaminophen [Tylenol 325mg tab] 325 mg PO PRN PRN 03/15/18 03/15/18


 


Atorvastatin [Lipitor] 20 mg PO DIN 03/15/18 03/15/18


 


Levalbuterol [Xopenex] 0.63 mg IH Q4H PRN 03/15/18 03/15/18


 


Magnesium Oxide [Mag-Ox] 400 mg PO BID 03/15/18 03/15/18


 


oxyCODONE/Acetaminophen [Percocet 1 tab PO PRN PRN 03/15/18 03/15/18





5/325 mg Tab]   














Review of Systems





- Physician Review


All systems were reviewed & negative as marked: Yes





- Review of Systems


Constitutional: absent: Fevers, Night Sweats


Respiratory: absent: SOB


Cardiovascular: absent: Chest Pain


Gastrointestinal: absent: Abdominal Pain, Nausea, Vomiting


Musculoskeletal: Other (right foot pain)





Physical Exam


Vital Signs Reviewed: Yes


Vital Signs











  Temp Pulse Resp BP Pulse Ox


 


 03/15/18 14:40   70    100


 


 03/15/18 13:51  97.5 F L   18  118/63 











Temperature: Afebrile


Blood Pressure: Normal


Respiratory Rate: Normal


Appearance: Positive for: Well-Appearing, Non-Toxic, Comfortable


Pain Distress: None


Mental Status: Positive for: Alert and Oriented X 3





- Systems Exam


Head: Present: Atraumatic, Normocephalic


Pupils: Present: PERRL


Extroacular Muscles: Present: EOMI


Conjunctiva: Present: Normal


Mouth: Present: Moist Mucous Membranes


Respiratory/Chest: Present: Clear to Auscultation, Good Air Exchange.  No: 

Respiratory Distress, Accessory Muscle Use


Cardiovascular: Present: Normal S1, S2, Irregular Rhythm.  No: Murmurs


Abdomen: Present: Normal Bowel Sounds.  No: Tenderness, Distention, Peritoneal 

Signs


Genitourinary Male: Present: Other (draining kim catheter)


Upper Extremity: Present: Normal Inspection.  No: Cyanosis, Edema


Lower Extremity: Present: Other (chronic venous stasis changes to bilateral 

feet. No distal pulse by doppler b/l.  R foot colder as compared to L.  Normal 

ankle temperature. Normal movement.)


Neurological: Present: GCS=15, CN II-XII Intact, Speech Normal


Skin: Present: Warm, Dry.  No: Rashes, Normal Color


Psychiatric: Present: Alert, Oriented x 3, Normal Insight, Normal Concentration





Medical Decision Making


ED Course and Treatment: 


03/15/18 14:31


Impression:


A 77 year old male sent in for evaluation of right foot





Plan:


-- Duplex lower extremity ultrasound


-- Labs


-- Reassess and disposition





Progress Notes:


Ultrasound immediately called upon patients arrival and sent directly to 

vascular.





EKG shows atrial fibrillation at 82 BPM with no acute ST changes compared to 

prior on 03/03/18. Interpreted by me.





03/15/18 17:32


VINCENZO shows no palpable pulses below popliteal b/l.  R VINCENZO:0.53 and L VINCENZO 0.8, 

consistent with severe arterial disease.  Concern that this is low perfusion 

state due to EF and/or clot due to afib hx and/or hypercoagulable state due to 

cancer.  Patient aware of the limb threatening nature of these results.  

Consults placed to Dr. Shine and Dr. Campo for futher vascular/IR 

recommendations.  Patient already on eliquis but transitioned to heparin.  Also 

giving slow hydration to maintain perfusion.  Dr. Shine requesting CTA with run 

off tomorrow after hydration.  Ordered.  Dr. Márquez and resident aware and to 

admit.





- Lab Interpretations


Lab Results: 








 03/15/18 16:00 





 03/15/18 16:30 





 Lab Results





03/15/18 16:30: Sodium 129 L, Potassium 4.6, Chloride 92 L, Carbon Dioxide 28, 

Anion Gap 15, BUN 25 H, Creatinine 1.1, Est GFR (African Amer) > 60, Est GFR (

Non-Af Amer) > 60, Random Glucose 100, Calcium 9.5, Total Bilirubin 1.7 H, AST 

71 H, ALT 56, Alkaline Phosphatase 162 H, Total Protein 7.2, Albumin 3.5, 

Globulin 3.7, Albumin/Globulin Ratio 0.9 L


03/15/18 16:30: PT 18.2 H, INR 1.58 H, APTT 37.6 H


03/15/18 16:00: Blood Type Cancelled, Antibody Screen Cancelled, BBK History 

Checked Cancelled


03/15/18 16:00: WBC 8.2  D, RBC 5.04, Hgb 14.1, Hct 41.1 L, MCV 81.5, MCH 28.0, 

MCHC 34.3, RDW 15.4 H, Plt Count 274, MPV 9.8, Gran % 57.7, Lymph % (Auto) 27.8

, Mono % (Auto) 14.0 H, Eos % (Auto) 0.4 L, Baso % (Auto) 0.1, Gran # 4.75, 

Lymph # (Auto) 2.3, Mono # (Auto) 1.2 H, Eos # (Auto) 0.0, Baso # (Auto) 0.01








I have reviewed the lab results: Yes





- RAD Interpretation


Radiology Orders: 








03/15/18 14:25


LOWER EXT ART NON-INV COMPL [US] Stat 














- Medication Orders


Current Medication Orders: 








Heparin Sodium/Sodium Chloride (Heparin 63670 Units/250ml 1/2 Normal Saline)  25

,000 units in 250 mls @ 10.002 mls/hr IV .Q24H MATTHIAS; 10.5 UNITS/KG/HR


   PRN Reason: Protocol


Sodium Chloride (Sodium Chloride 0.9%)  1,000 mls @ 100 mls/hr IV .Q10H MATTHIAS











- Scribe Statement


The provider has reviewed the documentation as recorded by the Darrian Pinedo





Provider Scribe Attestation:


All medical record entries made by the Scribe were at my direction and 

personally dictated by me. I have reviewed the chart and agree that the record 

accurately reflects my personal performance of the history, physical exam, 

medical decision making, and the department course for this patient. I have 

also personally directed, reviewed, and agree with the discharge instructions 

and disposition.








Disposition/Present on Arrival





- Present on Arrival


Any Indicators Present on Arrival: No


History of DVT/PE: No


History of Uncontrolled Diabetes: No


Urinary Catheter: No


History of Decub. Ulcer: No


History Surgical Site Infection Following: None





- Disposition


Have Diagnosis and Disposition been Completed?: Yes


Diagnosis: 


 Atrial fibrillation, Peripheral arterial occlusive disease, CHF (congestive 

heart failure)





Disposition: HOSPITALIZED


Disposition Time: 17:33


Patient Plan: Admission


Patient Problems: 


 Current Active Problems











Problem Status Onset


 


Atrial fibrillation Acute  


 


Peripheral arterial occlusive disease Acute  











Condition: FAIR

## 2018-03-15 NOTE — CP.PCM.CON
History of Present Illness





- History of Present Illness


History of Present Illness: 


Vascular Surgery consult note for Dr. Campo 





Patient is a 77 year old male with a past medical history of stage IV prostate 

cancer, a-fib on Eliquis, and dilated cardiomyopathy. The patient was brought 

to the ED from a nursing home for evaluation of his right foot. The patient 

states that he has been having pain in his legs and difficulty ambulating for 

many months. At the nursing home the nurses noticed that his right foot was 

cold and no pulses were felt prompting him to be sent to the ED. The patient 

complains of pain on the top of his feet and difficulty ambulating at the time 

of examination. The exact history of the chief complaint was difficult to 

obtain as the patient is a poor historian. An VINCENZO showed no palpable pulses 

below the popliteal artery bilaterally. The R VINCENZO was 0.53 and the L VINCENZO was 

0.8 consistent with severe peripheral arterial disease. Surgery was consulted 

for management of the patient's severe PAD. The patient reports nausea and one 

episode of vomiting however at the time of examination there was no nausea or 

vomiting present. The patient denies shortness of breath, chest pain, abdominal 

pain, fever or chills.   





PMH: stage IV prostate cancer, a-fib on Eliquis, dilated cardiomyopathy


PSH: cystoscopy, thoracentesis


Family Hx: non-contributatory


Social Hx: patient smoked for 50 years, previous alcohol use


Allergies: NKDA


Medications: MAR reviewed 


 








Review of Systems





- Musculoskeletal


Additional comments: 





diffulty walking, pain in both legs 





Past Patient History





- Infectious Disease


Hx of Infectious Diseases: None





- Tetanus Immunizations


Tetanus Immunization: Unknown





- Past Social History


Smoking Status: Never Smoked





- CARDIAC


Hx Cardiac Disorders: Yes (Afib)


Hx Atrial Fibrillation: Yes


Hx Congestive Heart Failure: Yes


Hx Hypertension: Yes





- PULMONARY


Hx Respiratory Disorders: No





- NEUROLOGICAL


Hx Neurological Disorder: No





- HEENT


Hx HEENT Problems: No





- RENAL


Hx Chronic Kidney Disease: No





- ENDOCRINE/METABOLIC


Hx Endocrine Disorders: No





- HEMATOLOGICAL/ONCOLOGICAL


Hx Blood Disorders: No





- INTEGUMENTARY


Hx Dermatological Problems: No





- MUSCULOSKELETAL/RHEUMATOLOGICAL


Hx Musculoskeletal Disorders: Yes


Hx Unsteady Gait: Yes





- GASTROINTESTINAL


Hx Gastrointestinal Disorders: No





- GENITOURINARY/GYNECOLOGICAL


Hx Genitourinary Disorders: No


Hx Prostate Cancer: Yes





- PSYCHIATRIC


Hx Depression: No


Hx Emotional Abuse: No


Hx Physical Abuse: No


Hx Substance Use: No





- SURGICAL HISTORY


Hx Surgeries: No





- ANESTHESIA


Hx Anesthesia: No





Meds


Allergies/Adverse Reactions: 


 Allergies











Allergy/AdvReac Type Severity Reaction Status Date / Time


 


No Known Allergies Allergy   Verified 01/28/14 15:34














- Medications


Medications: 


 Current Medications





Heparin Sodium/Sodium Chloride (Heparin 53708 Units/250ml 1/2 Normal Saline)  25

,000 units in 250 mls @ 10.002 mls/hr IV .Q24H MATTHIAS; 10.5 UNITS/KG/HR


   PRN Reason: Protocol


   Last Admin: 03/15/18 18:55 Dose:  10.5 units/kg/hr, 10.002 mls/hr


Sodium Chloride (Sodium Chloride 0.9%)  1,000 mls @ 100 mls/hr IV .Q10H MATTHIAS











Physical Exam





- Head Exam


Head Exam: ATRAUMATIC, NORMAL INSPECTION, NORMOCEPHALIC





- Eye Exam


Eye Exam: EOMI, Normal appearance


Pupil Exam: NORMAL ACCOMODATION, PERRL





- ENT Exam


ENT Exam: Mucous Membranes Moist, Normal Exam





- Neck Exam


Neck exam: Positive for: Normal Inspection





- Respiratory Exam


Respiratory Exam: NORMAL BREATHING PATTERN





- Cardiovascular Exam


Cardiovascular Exam: REGULAR RHYTHM





- GI/Abdominal Exam


GI & Abdominal Exam: Distended, Soft





- Rectal Exam


Additional comments: 





enlarged prostate, negative for blood, good sphincter tone 





- Extremities Exam


Additional comments: 





pain with palpation on both feet, both feet cold to touch, dorsalis pedis pulse 

not heard with doppler, wounds on the top of his right foot  





- Neurological Exam


Neurological exam: Alert, CN II-XII Intact, Oriented x3





- Skin


Additional comments: 





Skin of both feet cold to touch





Results





- Vital Signs


Recent Vital Signs: 


 Last Vital Signs











Temp  97.5 F L  03/15/18 13:51


 


Pulse  70   03/15/18 14:40


 


Resp  18   03/15/18 13:51


 


BP  118/63   03/15/18 13:51


 


Pulse Ox  100   03/15/18 14:40














- Labs


Result Diagrams: 


 03/15/18 16:00





 03/15/18 16:30





Assessment & Plan





- Assessment and Plan (Free Text)


Assessment: 





Patient is a 77 year old male with severe peripheral arterial disease. 


Plan: 


Continue IV fluids 


F/U CTA


Continue heparin drip 


Monitor PTT q6


Further reccs per Dr. Campo

## 2018-03-15 NOTE — CP.PCM.HP
<LorriGanga - Last Filed: 03/16/18 01:44>





History of Present Illness





- History of Present Illness


History of Present Illness: 





Ganga Blackmon PGY1 H&P for Dr. Márquez Service





cc: right foot pain





Mr. Johnson is a 77 year old male with a past medical history of stage IV prostate 

cancer w/ bone mets, a-fib on Eliquis, and dilated cardiomyopathy w/ EF 20% who 

was brought to the ED from a nursing home for evaluation of his right foot for 

pain and wounds. The patient states that he has been having pain in his legs 

and difficulty ambulating for many months. At the nursing home the nurses 

noticed that his right foot was cold and no pulses were felt prompting him to 

be sent to the ED. The patient complains of pain on the top of his feet and 

difficulty ambulating at the time of examination. The exact history of the 

chief complaint was difficult to obtain as the patient is a poor historian, but 

he also denies hx DM2. An VINCENZO showed no palpable pulses below the popliteal 

artery bilaterally. The R VINCENZO was 0.53 and the L VINCENZO was 0.8 consistent with 

severe peripheral arterial disease. The patient reports nausea and one episode 

of vomiting however at the time of examination there was no nausea or vomiting 

present. The patient denies shortness of breath, chest pain, abdominal pain, 

fever or chills. 12-point ROS was reviewed and is otherwise unremarkable. 





PMH: stage IV prostate cancer w/ bone mets, a-fib on Eliquis, and dilated 

cardiomyopathy w/ EF 20%


PSH: cystoscopy, thoracentesis


Family Hx: non-contributatory


Social Hx: patient smoked for 50 years, previous alcohol use


Allergies: NKDA


Medications: MAR reviewed 





Present on Admission





- Present on Admission


Any Indicators Present on Admission: No





Review of Systems





- Review of Systems


All systems: reviewed and no additional remarkable complaints except (as per HPI

)





Past Patient History





- Infectious Disease


Hx of Infectious Diseases: None





- Tetanus Immunizations


Tetanus Immunization: Unknown





- Past Social History


Smoking Status: Never Smoked


Alcohol: None


Drugs: Denies


Home Situation {Lives}: Nursing Home





- CARDIAC


Hx Cardiac Disorders: Yes (Afib)


Hx Atrial Fibrillation: Yes


Hx Congestive Heart Failure: Yes





- PULMONARY


Hx Respiratory Disorders: No





- NEUROLOGICAL


Hx Neurological Disorder: No





- HEENT


Hx HEENT Problems: No





- RENAL


Hx Chronic Kidney Disease: No





- ENDOCRINE/METABOLIC


Hx Endocrine Disorders: No





- HEMATOLOGICAL/ONCOLOGICAL


Hx Cancer: Yes





- INTEGUMENTARY


Hx Dermatological Problems: No





- MUSCULOSKELETAL/RHEUMATOLOGICAL


Hx Musculoskeletal Disorders: Yes


Hx Unsteady Gait: Yes





- GASTROINTESTINAL


Hx Gastrointestinal Disorders: No





- GENITOURINARY/GYNECOLOGICAL


Hx Genitourinary Disorders: No


Hx Prostate Cancer: Yes





- PSYCHIATRIC


Hx Depression: No


Hx Emotional Abuse: No


Hx Physical Abuse: No


Hx Substance Use: No





- SURGICAL HISTORY


Hx Surgeries: No





- ANESTHESIA


Hx Anesthesia: No





Meds


Allergies/Adverse Reactions: 


 Allergies











Allergy/AdvReac Type Severity Reaction Status Date / Time


 


No Known Allergies Allergy   Verified 01/28/14 15:34














Physical Exam





- Constitutional


Appears: Well, Non-toxic, No Acute Distress





- Head Exam


Head Exam: ATRAUMATIC, NORMAL INSPECTION





- Eye Exam


Eye Exam: EOMI, Normal appearance, PERRL





- ENT Exam


ENT Exam: Mucous Membranes Moist





- Respiratory Exam


Respiratory Exam: NORMAL BREATHING PATTERN.  absent: Rales, Rhonchi, Wheezes, 

Respiratory Distress





- Cardiovascular Exam


Cardiovascular Exam: RRR, +S1, +S2





- GI/Abdominal Exam


GI & Abdominal Exam: Distended, Soft.  absent: Guarding, Tenderness





- Extremities Exam


Extremities exam: Positive for: tenderness (b/l feet).  Negative for: pedal 

edema, pedal pulses present (no pulses felt popliteal and distal)


Additional comments: 





R foot with open weeping wounds


feet edema +1


feet are cold 





- Neurological Exam


Neurological exam: Alert





- Psychiatric Exam


Psychiatric exam: Normal Affect, Normal Mood





- Skin


Skin Exam: Normal Color, Warm





Results





- Vital Signs


Recent Vital Signs: 





 Last Vital Signs











Temp  97.5 F L  03/15/18 13:51


 


Pulse  70   03/15/18 14:40


 


Resp  18   03/15/18 13:51


 


BP  118/63   03/15/18 13:51


 


Pulse Ox  100   03/15/18 14:40














- Labs


Result Diagrams: 


 03/15/18 16:00





 03/15/18 16:30





Assessment & Plan





- Assessment and Plan (Free Text)


Assessment: 





77 year old male with a past medical history of stage IV prostate cancer w/ 

bone mets, a-fib on Eliquis, and dilated cardiomyopathy w/ EF 20% presented for 

R foot pain, found to have peripheral vascular disease w/ ischemic pain. 





Plan:





1. PVD w/ ischemic pain


- US on LE done and shows severely abnormal resting VINCENZO, b/l SFA disease, 

aortoiliac disease (R>L), and essentially flat distal ankle and metatarsal 

waveforms


- IR consulted


- Vascular surgery consulted


- patient started on heparin drip


- monitor PTT


- will hold home Eliquis given heparin drip


- blood and wound cultures ordered


- follow coags and CBC/CMP daily


- ID consulted, recs appreciated


- CT Angio Abd ordered


- bed rest


- start on Teflaro


- ID consulted, recs appreciated 





2. Hyponatremia


- Nephro consulted


- gentle hydration NS 0.9


- indwelling kim catheter will be replaced


- monitor electrolytes daily and replete as necessary


- monitor UOP


- will hold Lasix at this time 





3. Hx Afib 


- EKG shows atrial fibrillation at 82 BPM with no acute ST changes


- monitor for changes in BP


- cont Digoxin, cardizem, and Lopressor home dose 





4. Hx cardiomyopathy


- cont ASA


- cont Lipitor


- cont Xopenex


- cont Casodex 





6. Hx stage IV prostate CA 


- no acute changes





PTX/heparin gtt


HHD


Miralax 


Perocet PRN





Patient was seen, examined and discussed with attending, Dr. Yulisa Blackmon PGY1 





<Dale Márquez U - Last Filed: 03/21/18 00:00>





Results





- Vital Signs


Recent Vital Signs: 





 Last Vital Signs











Temp  97.9 F   03/20/18 17:40


 


Pulse  67   03/20/18 18:06


 


Resp  18   03/20/18 17:40


 


BP  118/75   03/20/18 18:06


 


Pulse Ox  98   03/20/18 06:00














- Labs


Result Diagrams: 


 03/18/18 06:00





 03/18/18 06:00


Labs: 





 Laboratory Results - last 24 hr











  03/20/18 03/20/18 03/20/18





  01:50 05:10 07:55


 


PT   17.5 H 


 


INR   1.51 H 


 


APTT  45.2 H  73.0 H  86.2 H














Attending/Attestation





- Attestation


I have personally seen and examined this patient.: Yes


I have fully participated in the care of the patient.: Yes


I have reviewed all pertinent clinical information: Yes


Notes (Text): 


Please read/see my dictated notes.

## 2018-03-16 LAB
ALBUMIN SERPL-MCNC: 3.2 G/DL (ref 3–4.8)
ALBUMIN/GLOB SERPL: 0.9 {RATIO} (ref 1.1–1.8)
ALT SERPL-CCNC: 54 U/L (ref 7–56)
APTT BLD: 114.1 SECONDS (ref 25.1–36.5)
APTT BLD: 59 SECONDS (ref 25.1–36.5)
AST SERPL-CCNC: 62 U/L (ref 17–59)
BUN SERPL-MCNC: 23 MG/DL (ref 7–21)
CALCIUM SERPL-MCNC: 9.1 MG/DL (ref 8.4–10.5)
ERYTHROCYTE [DISTWIDTH] IN BLOOD BY AUTOMATED COUNT: 15.6 % (ref 11.5–14.5)
GFR NON-AFRICAN AMERICAN: > 60
HGB BLD-MCNC: 13.8 G/DL (ref 14–18)
INR PPP: 1.72 (ref 0.93–1.08)
INR PPP: 1.85 (ref 0.93–1.08)
MCH RBC QN AUTO: 27.4 PG (ref 25–35)
MCHC RBC AUTO-ENTMCNC: 33.7 G/DL (ref 31–37)
MCV RBC AUTO: 81.5 FL (ref 80–105)
PLATELET # BLD: 267 10^3/UL (ref 120–450)
PMV BLD AUTO: 9.5 FL (ref 7–11)
PROTHROMBIN TIME: 19.8 SECONDS (ref 9.4–12.5)
PROTHROMBIN TIME: 21.5 SECONDS (ref 9.4–12.5)
RBC # BLD AUTO: 5.03 10^6/UL (ref 3.5–6.1)
WBC # BLD AUTO: 7.5 10^3/UL (ref 4.5–11)

## 2018-03-16 RX ADMIN — DILTIAZEM HYDROCHLORIDE SCH MG: 240 CAPSULE, COATED, EXTENDED RELEASE ORAL at 09:00

## 2018-03-16 RX ADMIN — HEPARIN SODIUM SCH MLS/HR: 10000 INJECTION, SOLUTION INTRAVENOUS at 04:55

## 2018-03-16 RX ADMIN — PANTOPRAZOLE SODIUM SCH MG: 20 TABLET, DELAYED RELEASE ORAL at 08:59

## 2018-03-16 RX ADMIN — POLYETHYLENE GLYCOL 3350 SCH: 17 POWDER, FOR SOLUTION ORAL at 15:11

## 2018-03-16 RX ADMIN — Medication SCH MG: at 18:04

## 2018-03-16 RX ADMIN — LEVALBUTEROL SCH: 0.63 SOLUTION RESPIRATORY (INHALATION) at 07:32

## 2018-03-16 RX ADMIN — VANCOMYCIN HYDROCHLORIDE SCH MLS/HR: 1 INJECTION, POWDER, LYOPHILIZED, FOR SOLUTION INTRAVENOUS at 17:55

## 2018-03-16 RX ADMIN — LEVALBUTEROL SCH: 0.63 SOLUTION RESPIRATORY (INHALATION) at 06:46

## 2018-03-16 RX ADMIN — POLYETHYLENE GLYCOL 3350 SCH GM: 17 POWDER, FOR SOLUTION ORAL at 10:22

## 2018-03-16 RX ADMIN — LEVALBUTEROL SCH MG: 0.63 SOLUTION RESPIRATORY (INHALATION) at 13:39

## 2018-03-16 RX ADMIN — POLYETHYLENE GLYCOL 3350 SCH: 17 POWDER, FOR SOLUTION ORAL at 18:05

## 2018-03-16 RX ADMIN — LEVALBUTEROL SCH MG: 0.63 SOLUTION RESPIRATORY (INHALATION) at 20:38

## 2018-03-16 RX ADMIN — DIGOXIN SCH MG: 0.12 TABLET ORAL at 09:00

## 2018-03-16 RX ADMIN — Medication SCH MG: at 09:01

## 2018-03-16 NOTE — CP.PCM.PN
Subjective





- Date & Time of Evaluation


Date of Evaluation: 03/16/18


Time of Evaluation: 13:06





- Subjective


Subjective: 





Surgery: Dr. Campo





Pt seen and examined. Resting comfortably in bed. Has pain in RLE when touched. 

Otherwise pain is controlled.





Objective





- Vital Signs/Intake and Output


Vital Signs (last 24 hours): 


 











Temp Pulse Resp BP Pulse Ox


 


 98.1 F   68   19   146/86   100 


 


 03/16/18 12:00  03/16/18 12:00  03/16/18 12:00  03/16/18 12:00  03/16/18 05:20








Intake and Output: 


 











 03/16/18 03/16/18





 06:59 18:59


 


Intake Total 0 2


 


Output Total 100 


 


Balance -100 2














- Medications


Medications: 


 Current Medications





Acetaminophen (Tylenol 325mg Tab)  650 mg PO Q6 PRN


   PRN Reason: TEMP>=99.5F


Acetaminophen (Tylenol 325mg Tab)  650 mg PO Q6 PRN


   PRN Reason: Headache


Acetaminophen (Tylenol 650 Mg Supp)  650 mg RC Q6H PRN


   PRN Reason: Headache


Acetaminophen (Tylenol 650 Mg Supp)  650 mg RC Q6H PRN


   PRN Reason: TEMP>=99.5F,unable to take PO


Aspirin (Ecotrin)  81 mg PO DAILY [facility]


   Last Admin: 03/16/18 09:01 Dose:  81 mg


Atorvastatin Calcium (Lipitor)  20 mg PO DIN [facility]


Bicalutamide (Casodex)  50 mg PO DAILY [facility]


   Last Admin: 03/16/18 10:02 Dose:  50 mg


Digoxin (Digoxin)  0.125 mg PO DAILY [facility]


   Last Admin: 03/16/18 09:00 Dose:  0.125 mg


Diltiazem HCl (Cardizem Cd)  240 mg PO DAILY [facility]


   Last Admin: 03/16/18 09:00 Dose:  240 mg


Docusate Sodium (Colace)  100 mg PO TID [facility]


   Last Admin: 03/16/18 09:00 Dose:  100 mg


Furosemide (Lasix)  20 mg IVP Q12 [facility]


Heparin Sodium/Sodium Chloride (Heparin 08555 Units/250ml 1/2 Normal Saline)  25

,000 units in 250 mls @ 9.906 mls/hr IV .Q24H MATTHIAS; 12 UNITS/KG/HR


   PRN Reason: Protocol


   Last Titration: 03/16/18 12:25 Dose:  9 units/kg/hr, 7.43 mls/hr


Levalbuterol HCl (Xopenex)  0.63 mg IH [id number] [facility]


   Last Admin: 03/16/18 07:32 Dose:  Not Given


Levalbuterol HCl (Xopenex)  0.63 mg IH Q2H PRN


   PRN Reason: Shortness of Breath


Magnesium Oxide (Mag-Ox)  400 mg PO BID [facility]


   Last Admin: 03/16/18 09:01 Dose:  400 mg


Metoprolol Tartrate (Lopressor)  50 mg PO BID [facility]


   Last Admin: 03/16/18 08:59 Dose:  50 mg


Ondansetron HCl (Zofran Tab)  4 mg PO Q4 PRN


   PRN Reason: Nausea/Vomiting


   Last Admin: 03/16/18 08:59 Dose:  4 mg


Oxycodone/Acetaminophen (Percocet 5/325 Mg Tab)  1 tab PO Q6 PRN


   PRN Reason: Pain, severe (8-10)


   Stop: 03/19/18 00:01


   Last Admin: 03/16/18 08:58 Dose:  1 tab


Pantoprazole Sodium (Protonix Ec Tab)  40 mg PO DAILY [facility]


   Last Admin: 03/16/18 08:59 Dose:  40 mg


Polyethylene Glycol (Miralax)  17 gm PO TID [facility]


   Last Admin: 03/16/18 10:22 Dose:  17 gm











- Labs


Labs: 


 





 03/16/18 06:30 





 03/16/18 06:30 





 











PT  21.5 SECONDS (9.4-12.5)  H  03/16/18  09:45    


 


INR  1.85  (0.93-1.08)  H  03/16/18  09:45    


 


APTT  114.1 Seconds (25.1-36.5)  H*  03/16/18  09:45    














- Constitutional


Appears: Non-toxic, No Acute Distress





- Head Exam


Head Exam: ATRAUMATIC, NORMOCEPHALIC





- Eye Exam


Eye Exam: EOMI





- ENT Exam


ENT Exam: Mucous Membranes Moist





- Neck Exam


Neck Exam: Full ROM





- Respiratory Exam


Respiratory Exam: NORMAL BREATHING PATTERN.  absent: Accessory Muscle Use, 

Respiratory Distress





- GI/Abdominal Exam


GI & Abdominal Exam: Soft.  absent: Tenderness





- Extremities Exam


Additional comments: 





RLE: chronic skin changes, +blisters, no palpable pulse, no sensation/motor fxn





- Neurological Exam


Neurological Exam: Alert, Awake, Oriented x3





- Psychiatric Exam


Psychiatric exam: Normal Affect, Normal Mood





Assessment and Plan





- Assessment and Plan (Free Text)


Assessment: 





77M w. stage IV prostate CA and severe PAD


-recommend local wound care


-no plans for surgical intervention at this time


-will signoff, please re-consult if needed


-d/w attending





Ashvin PGY3

## 2018-03-16 NOTE — HP
HISTORY OF PRESENT ILLNESS:  The patient is a 77-year-old 

male, transferred from [facility] because the nurses found

patient's right foot to be cold without palpable pulses.  Patient has been

undergoing physical therapy and rehab therapy at [facility]. 

Patient was brought to the [facility] emergency room by the

ambulance because, according to the [facility] nurses, right foot was

pulseless, cold and patient started to complain of right foot pain

according to the ER physician's evaluation.  Patient came to the emergency

room by ambulance from [facility] for evaluation of

pulseless and cold foot and complaining of right foot pain.



REVIEW OF SYSTEMS:  A 13-system review was done, pertinent positives and

negatives dictated above.



CODE STATUS:  Full code.



LIVING WILL ADVANCE DIRECTIVE:  None.



ALLERGIES:  NONE.



Height is 5 feet 11 inches.



Weight is 185.



BMI is 26.



HOME MEDICATIONS:

1.  Allopurinol 300 mg daily.

2.  Zofran 4 mg p.o. q.4 p.r.n.

3.  Xopenex nebulizer 0.63 mg every 6 hours.

4.  Tylenol 650 q.4 p.r.n.

5.  Protonix 40 mg daily.

6.  Percocet 5/325 one tablet p.r.n.

7.  MiraLax 17 g twice a day.

8.  Magnesium oxide 400 twice a day.

9.  Lopressor 50 mg twice a day.

10.  Lipitor 20 mg daily.

11.  Lasix 40 mg twice a day.

12.  Eliquis 2.5 mg twice a day.

13.  Aspirin 81 mg daily.

14.  Digoxin 0.125 daily.

15.  Colace 100 mg three times a day.

16.  Casodex 50 mg daily.

17.  Cardizem  mg daily.



SOCIAL HISTORY:  Negative for substance abuse.  Negative for alcohol use. 

Negative for smoking use.



FAMILY HISTORY:  Not available.



OCCUPATIONAL HISTORY:  Disabled.



PAST MEDICAL AND SURGICAL HISTORY:  History of stage IV adenocarcinoma of

the prostate with spread to bone, history of right pleural effusion status

post thoracentesis, history of nonischemic dilated cardiomyopathy with

ejection fraction of 20%, history of constipation;history of heart failure,

systolic congestive heart failure; history of systolic cardiomyopathy,

history of dyslipidemia, history of hypomagnesemia, history of

hyperuricemia, history of syndrome of inappropriate antidiuretic hormone,

history of hyponatremia.  Past medical history is also significant for

bilateral lower extremity venous stasis.  Patient's past medical history is

significant for encephalopathy, history of stage IV adenocarcinoma of the

prostate with bone metastasis, history of severe hyponatremia secondary to

SIADH secondary to stage IV metastatic adenocarcinoma of the prostate,

history of atrial fibrillation, history of normocytic anemia, history of

refractory hyponatremia, history of hyperkalemia, history of transaminitis

with hyperbilirubinemia, history of rhabdomyolysis, history of elevated

prostate-specific antigen ranging from 50 to greater than 90, history of

proteinuria, microscopic hematuria, pyuria, bacteriuria, history of

hypervolemic hyponatremia secondary to systolic congestive heart failure

and SIADH secondary to stage IV metastatic adenocarcinoma of the prostate,

history of hypertensive cardiovascular disease, history of chronic kidney

disease stage III, history of dilated cardiomyopathy with ejection fraction

20%, history of hypovitaminosis D, history of severe symptomatic

hyponatremia and altered mental status and confusion, history of

hyperbilirubinemia, history of urinary retention, history of lumbar

metastasis of the adenocarcinoma of the prostate with multiple thoracic

spine sclerotic lesion; history of right lower lobe consolidation,

pneumonia, pleural effusion; history of severe deconditioning and gait

dysfunction, history of cerebral cortical atrophy of the brain, history of

probable right basal ganglia chronic lacunar infarct, history of

intracranial atherosclerotic disease, history of lateral ischemic changes

on the EKG, history of severely dilated cardiomyopathy, history of anemia

with granulocytosis, history of poor compliance and noncompliance, history

of leukopenia, history of hyperkalemia secondary to angiotensin receptor

blocker and ACE-induced, history of pulmonary arterial hypertension with

right ventricular systolic pressure of 52 mmHg, history of concentric left

ventricular hypertrophy, history of global LV hypokinesis, history of

dilated left and right atrium, history of decreased right ventricular

systolic function, history of thickened aortic and mitral valve, history of

moderate mitral and tricuspid regurgitation, history of decompensated

nonischemic dilated cardiomyopathy and decompensated systolic congestive

heart failure, history of elevated PSA, history of diffuse osteopenia and

osteoporosis, history of questionable ileus,history of acute kidney injury,

history of cystoscopy and insertion of an 18-[place]ian two-way Fraire catheter

with evacuation of clots and fulguration with retrograde pyelogram, history

of CT-guided lumbar 2 vertebral body biopsy, history of hypervolemic

hyponatremia, history of bilateral lower extremity lymphedema secondary to

pulmonary arterial hypertension and elevated right ventricular systolic

pressure, history of diffuse osseous metastasis, history of centrilobular

and paraseptal emphysema with atelectasis, history of adrenal gland

hypertrophy, history of renal cyst, history of diverticulosis, history of

avascular necrosis of the femoral head, history of retroperitoneal

lymphadenopathy, history of inferolateral coronary ischemia, history of

lost to followup for the office visits for more than a year.  History

healthcare-associated right lower lobe pneumonia, atelectasis, airspace

disease and moderate-to-large pleural effusion, history of Eliquis

dependent atrial fibrillation, history of pulmonary arterial hypertension

with elevated right ventricular systolic pressure.



The patient was seen in stretcher #14 in the emergency room.  Patient was

seen lying in the bed.  Patient was seen and examined with the ER

physician.



PHYSICAL EXAMINATION:

GENERAL:  Patient is alert, awake, responsive.  Patient is lying

comfortable.

VITAL SIGNS:  T-max is 97.5, pulse is 70, blood pressure 118/63,

respiration is 18, O2 sat is 100%.

HEENT:  Patient's head examination is normocephalic, atraumatic.  HEENT

examination shows pink conjunctivae.  Anicteric sclerae.  Dry oral mucosa.

NECK:  No neck rigidity.  Questionable soft carotid bruit.

CHEST:  Kyphosis.

LUNGS:  Positive decreased breath sound at the bases, left more than the

right; bibasilar decreased breath sounds at the bases.

CARDIOVASCULAR:  S1 and S2, irregular rhythm.  Positive systolic murmur at

right second intercostal space, left second intercostal space, left sternal

border.

ABDOMEN:  Soft, slightly protuberant.  Positive bowel sound.  No

hepatosplenomegaly noted.  No costovertebral angle tenderness.

GENITALIA:  Male.

RECTAL:  Deferred.

EXTREMITIES:  Show trace swelling of the lower extremity.  Positive skin

discoloration of the lower extremity and foot with some skin discoloration

of the lower extremity in the right foot more than the left with positive

cold right foot with nonpalpable pulses noted of the right lower extremity

and also the left lower extremity, but the left foot and left lower

extremity is warm to touch.

MUSCULOSKELETAL:  Shows body mass index of 26.  Gait examination is not

tested.

VASCULAR:  Shows right foot to be cold to touch, no palpable pulses.  Left

lower extremity and left foot is not cold to touch and is warm to touch.



DIAGNOSTICS:  On 03/15/2018, WBC 8.2, hemoglobin and hematocrit 14.1 and

41.1, platelet 274.  PT/PTT 18.2, 37.6.  INR 1.6.  Sodium is 129, potassium

is 4.6, chloride 92, CO2 of 28, anion gap 15, BUN 25, creatinine 1.1, GFR

greater than 60, glucose 100, calcium 9.5, bilirubin 1.7.  AST 71 and alk

phos 162.



Patient had arterial Doppler of the lower extremity done.  EKG done. 

Patient's chest x-ray is not done.  Patient's arterial Doppler shows

flattened waveform at the level of the metatarsal.





__________________________________________

Dale Márquez MD



DD:  03/15/2018 20:32:16

DT:  03/15/2018 20:46:45

Job # [id number]

## 2018-03-16 NOTE — CON
DATE:



REASON FOR CONSULTATION:  Ischemic right foot.



HISTORY OF PRESENT ILLNESS:  The patient is a 77-year-old man who is

transferred from rehab unit for a cold right foot and nonpalpable pulse. 

The patient has been undergoing physiotherapy and rehab therapy there after

being diagnosed with stage IV prostatic carcinoma.  When he was brought to

the [facility] ([facility] Emergency Room, he already had a four day

history of right foot pain.  The right foot was found to be cold and

pulseless.  The patient was admitted for diagnosis and treatment.



REVIEW OF SYSTEMS:

HEENT:  Negative.

CARDIAC:  The patient has an ejection fraction of 20%.

LUNGS:  The patient has COPD.

ABDOMEN:  He has a history of hyperbilirubinemia.  Electively, the patient

has history of inappropriate ADH, hypokalemia, hyponatremia secondary to

rhabdomyolysis.

:  He has a history of chronic renal failure, proteinuria, microscopic

hematuria, pyuria.  He also had a history of urinary retention and

prostatic CA stage IV.

CONSTITUTIONAL SYMPTOMS:  Patient has been loosing weight and is severely

deconditioned.



The remainder of the review of system was unremarkable.



PHYSICAL EXAMINATION

GENERAL:  Showed an elderly gentleman in moderate distress.

HEENT:  Negative.

CHEST:  Clear.

ABDOMEN:  Soft.  There is no tenderness.

EXTREMITIES:  Revealed palpable femoral pulses and no distal pulses.  The

right foot is cold and the dorsum is gangrenous.  He still has preservation

of dorsiflexion of the foot and the toes on both feet.  The sensation to

pinprick is diminished in the dorsum.  Other than the femoral pulses there

is no palpable pulses distally.



The CT angiogram was reviewed showing severe calcification of the

aortoiliac artery, femoral artery, profunda femoris artery, superficial

femoral artery and the tibial vessels.  There is no blood flow below the

femoral artery.



Electrolyte showed the potassium 4.6, chloride 92, CO2 28, sodium 129,

creatinine 1.1, BUN 25.  CBC show a WBC of 8.2, hemoglobin of 14.1.  After

examination of the patient, the condition was discussed with Dr. Márquez and

the patient's son.  The prognosis was deemed dismal.  There is no

reconstruction of both of his leg due to the severity of disease, the low

ejection fraction, cardiomyopathy as well as the patient's mental status. 

After discussion with his son, the son agreed that palliative care will be

the best option for his father.  Agreed with Dr. Márquez, the patient need

palliative care and he has been referred to hospice nurse.





__________________________________________

Rowena Campo MD



DD:  03/16/2018 12:31:36

DT:  03/16/2018 14:00:34

Job # [id number]

## 2018-03-16 NOTE — CP.PCM.CON
History of Present Illness





- History of Present Illness


History of Present Illness: 


Nephrology Consultation Note





Assessment: stable


PVD


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 

prostate CA


Hypertensive Chronic Kidney Disease (I12.9)


Chronic Kidney Disease (N18.3) Stage 3 with Cr 1.2 


metastatic prostate cancer, HTN (I12.9)


systolic CHF LVEF 20%


chronic indwelling kim catheter





Plan


Na stable for now on oral fluid fluid restriction 1000 mL per day and lasix 40 

mg po bid 


pt s/p angiogram for PVD eval. will d/c IVF now due to hx of severe CHF. resume 

diuretics tomorrow if no further IV iodinated contrast exposure planned


Hypertension control with meds as ordered. unable to tolerate losartan due to 

hyperkalemia episodes.


Monitor Input/Output, daily weights, basic metabolic panel and sodium level


supplement electrolytes as needed


Avoid nephrotoxins/NSAIDs


Glycemic control


Further work up for as per primary team. CHF management as per cardiology





Thanks for allowing me to participate in care of your patient. Will follow 

patient with you. Please call if any Qs. d/w team. 


Dr Rickey Tapia


Office: [phone] Cell: [phone] Fax: [phone]





Reason for consultation; Hyponatremia


HPI patient is 77-year-old male with history of chronic systolic CHF EF 20% 

also metastatic prostate cancer , BPH, recently seen for hyponatremia due to 

CHF and likely SIADH discharged to nursing home here with the Rt foot PVD. 

Renal consult was requested for hyponatremia management. Patient at this time 

denies shortness of breath nausea vomiting any pain in his stomach but reports 

constipation.





Subjective/ROS: Noted events overnight. Patients feels usual health except 

feet. 


Denies chest pain, palpitation, denies shortness of breath


All other negative Except as mentioned in HPI. had bone biopsy by IR on 2/13/18 

Showed metastatic adeno carcinoma of prostate


s/p Rt thoracocentesis 3/7/18 ~ 700 mL fluid aspirated





Physical Examination:      


General Appearance: Comfortable, in no acute respiratory distress, co-operative 

. 


Vitals reviewed and noted as below


Head; Atraumatic, normocephalic


ENT: no ulcers no thrush. Tongue is midline. Oropharynx: no rash or ulcers.


EYES: Pupils are equal, round and reactive to light accommodation. Eye muscles 

and extraocular movement intact. Sclera is anicteric.


Neck; supple no lymphadenopathy, no thyromegaly or bruit


Lungs: Normal respiratory rate/effort. Breath sounds  b/l present and clear


Heart: Normal rate. s1s2 normal. No rub or gallop. 


Extremities: no edema. No varicose veins. Rt foot cold to touch


Neurological: Patient is alert, awake and oriented to person, place and time. 

No focal deficit. Strength bilateral appropriate and equal


Skin: Warm and dry. Normal turgor. No rash. Palpitation: Normal elasticity for 

age. Feets somewhat cool to touch


Abdomen: Abdomen is soft. Bowel sounds +. There is no abdominal tenderness, no 

guarding/rigidity no organomegaly. abdomen distended


Psych: normal insight and normal affect/mood


MSK: no joint tenderness or swelling. Digits and nails normal, no deformity


: kidney or bladder not palpable. Has Kim catheter in place





Labs/imaging reviewed.


Past medical history, past surgical history, family history, social history, 

allergy reviewed and noted as below


Family hx: no hx of CKD. Rest non-contributor





Past Patient History





- Infectious Disease


Hx of Infectious Diseases: None





- Tetanus Immunizations


Tetanus Immunization: Unknown





- Past Social History


Smoking Status: Former Smoker





- CARDIAC


Hx Cardiac Disorders: Yes (Afib)


Hx Atrial Fibrillation: Yes


Hx Congestive Heart Failure: Yes





- PULMONARY


Hx Respiratory Disorders: No





- NEUROLOGICAL


Hx Neurological Disorder: No





- HEENT


Hx HEENT Problems: No





- RENAL


Hx Chronic Kidney Disease: No





- ENDOCRINE/METABOLIC


Hx Endocrine Disorders: No





- HEMATOLOGICAL/ONCOLOGICAL


Hx Cancer: Yes





- INTEGUMENTARY


Hx Dermatological Problems: No





- MUSCULOSKELETAL/RHEUMATOLOGICAL


Hx Falls: No





- GASTROINTESTINAL


Hx Gastrointestinal Disorders: No





- GENITOURINARY/GYNECOLOGICAL


Hx Genitourinary Disorders: No


Hx Prostate Cancer: Yes





- PSYCHIATRIC


Hx Depression: No


Hx Emotional Abuse: No


Hx Physical Abuse: No


Hx Substance Use: No





- SURGICAL HISTORY


Hx Surgeries: No





- ANESTHESIA


Hx Anesthesia: No





Meds


Allergies/Adverse Reactions: 


 Allergies











Allergy/AdvReac Type Severity Reaction Status Date / Time


 


No Known Allergies Allergy   Verified 01/28/14 15:34














- Medications


Medications: 


 Current Medications





Acetaminophen (Tylenol 325mg Tab)  650 mg PO Q6 PRN


   PRN Reason: TEMP>=99.5F


Acetaminophen (Tylenol 650 Mg Supp)  650 mg RC Q6H PRN


   PRN Reason: TEMP>=99.5F


Acetaminophen (Tylenol 325mg Tab)  650 mg PO Q6 PRN


   PRN Reason: Headache


Acetaminophen (Tylenol 650 Mg Supp)  650 mg RC Q6H PRN


   PRN Reason: Headache


Aspirin (Ecotrin)  81 mg PO DAILY [facility]


   Last Admin: 03/16/18 09:01 Dose:  81 mg


Atorvastatin Calcium (Lipitor)  20 mg PO DIN [facility]


Bicalutamide (Casodex)  50 mg PO DAILY [facility]


   Last Admin: 03/16/18 10:02 Dose:  50 mg


Digoxin (Digoxin)  0.125 mg PO DAILY [facility]


   Last Admin: 03/16/18 09:00 Dose:  0.125 mg


Diltiazem HCl (Cardizem Cd)  240 mg PO DAILY [facility]


   Last Admin: 03/16/18 09:00 Dose:  240 mg


Docusate Sodium (Colace)  100 mg PO TID [facility]


   Last Admin: 03/16/18 09:00 Dose:  100 mg


Heparin Sodium/Sodium Chloride (Heparin 25043 Units/250ml 1/2 Normal Saline)  25

,000 units in 250 mls @ 9.906 mls/hr IV .Q24H [facility]; 12 UNITS/KG/HR


   PRN Reason: Protocol


   Last Admin: 03/16/18 04:55 Dose:  12 units/kg/hr, 9.906 mls/hr


Levalbuterol HCl (Xopenex)  0.63 mg IH [id number] [facility]


   Last Admin: 03/16/18 07:32 Dose:  Not Given


Levalbuterol HCl (Xopenex)  0.63 mg IH Q2H PRN


   PRN Reason: Shortness of Breath


Magnesium Oxide (Mag-Ox)  400 mg PO BID [facility]


   Last Admin: 03/16/18 09:01 Dose:  400 mg


Metoprolol Tartrate (Lopressor)  50 mg PO BID [facility]


   Last Admin: 03/16/18 08:59 Dose:  50 mg


Ondansetron HCl (Zofran Tab)  4 mg PO Q4 PRN


   PRN Reason: Nausea/Vomiting


   Last Admin: 03/16/18 08:59 Dose:  4 mg


Oxycodone/Acetaminophen (Percocet 5/325 Mg Tab)  1 tab PO Q6 PRN


   PRN Reason: Pain, severe (8-10)


   Stop: 03/19/18 00:01


   Last Admin: 03/16/18 08:58 Dose:  1 tab


Pantoprazole Sodium (Protonix Ec Tab)  40 mg PO DAILY [facility]


   Last Admin: 03/16/18 08:59 Dose:  40 mg


Polyethylene Glycol (Miralax)  17 gm PO TID [facility]


   Last Admin: 03/16/18 10:22 Dose:  17 gm











Results





- Vital Signs


Recent Vital Signs: 


 Last Vital Signs











Temp  97.9 F   03/16/18 05:20


 


Pulse  92 H  03/16/18 09:00


 


Resp  20   03/16/18 05:20


 


BP  127/69   03/16/18 09:00


 


Pulse Ox  100   03/16/18 05:20














- Labs


Result Diagrams: 


 03/16/18 06:30





 03/16/18 06:30


Labs: 


 Laboratory Results - last 24 hr











  03/16/18 03/16/18 03/16/18





  01:15 06:30 06:30


 


WBC   7.5 


 


RBC   5.03 


 


Hgb   13.8 L 


 


Hct   41.0 L 


 


MCV   81.5 


 


MCH   27.4 


 


MCHC   33.7 


 


RDW   15.6 H 


 


Plt Count   267 


 


MPV   9.5 


 


PT   


 


INR   


 


APTT  38.0 H  


 


Sodium    130 L


 


Potassium    4.0


 


Chloride    94 L


 


Carbon Dioxide    25


 


Anion Gap    15


 


BUN    23 H


 


Creatinine    1.1


 


Est GFR ( Amer)    > 60


 


Est GFR (Non-Af Amer)    > 60


 


Random Glucose    81


 


Calcium    9.1


 


Magnesium    1.9


 


Total Bilirubin    1.6 H


 


AST    62 H


 


ALT    54


 


Alkaline Phosphatase    141 H


 


Total Protein    6.5


 


Albumin    3.2


 


Globulin    3.4


 


Albumin/Globulin Ratio    0.9 L














  03/16/18





  09:45


 


WBC 


 


RBC 


 


Hgb 


 


Hct 


 


MCV 


 


MCH 


 


MCHC 


 


RDW 


 


Plt Count 


 


MPV 


 


PT  21.5 H


 


INR  1.85 H


 


APTT  114.1 H*


 


Sodium 


 


Potassium 


 


Chloride 


 


Carbon Dioxide 


 


Anion Gap 


 


BUN 


 


Creatinine 


 


Est GFR ( Amer) 


 


Est GFR (Non-Af Amer) 


 


Random Glucose 


 


Calcium 


 


Magnesium 


 


Total Bilirubin 


 


AST 


 


ALT 


 


Alkaline Phosphatase 


 


Total Protein 


 


Albumin 


 


Globulin 


 


Albumin/Globulin Ratio

## 2018-03-16 NOTE — CT
PROCEDURE:  CT Angiography Abdomen, Pelvis and Lower Extremity with 

Contrast



HISTORY:

peripheral arterial disease, requested by Dr. Rojas



COMPARISON:

None.



TECHNIQUE:

Technique: CT angiography of the abdomen, pelvis and bilateral lower 

extremities performed in the arterial phase of enhancement. Coronal 

and sagittal reformats, and well as rotating MIP images of the 

vessels generated at the workstation.



Intravenous contrast dose: 150 cc of Omni 350



Radiation dose:



Total exam DLP = 1708 mGy-cm.



This CT exam was performed using one or more of the following dose 

reduction techniques: Automated exposure control, adjustment of the 

mA and/or kV according to patient size, and/or use of iterative 

reconstruction technique.



FINDINGS:



CT ANGIOGRAPHY:



ABDOMINAL AORTA::

Unremarkable.  Severe tortuosity of the iliac vessels. 



MAJOR AORTIC BRANCHES:

Celiac Axis: Unremarkable.



Superior mesenteric artery: Unremarkable.



Inferior mesenteric artery: Unremarkable.



Renal arteries: Unremarkable.



PELVIC ARTERIES:

Right Common Iliac: Unremarkable.



Right External Iliac:  Unremarkable.



Right Internal Iliac:  Unremarkable.



Left Common Iliac: Unremarkable.



Left External Iliac: Unremarkable.



Left Internal Iliac: Unremarkable.



RIGHT LOWER EXTREMITY ARTERIES:

There is complete occlusion of the superficial femoral artery. 



There is no flow seen within the popliteal and no flow demonstrated 

in the tibials or peroneal arteries. Collateral vessels can be seen 



LEFT LOWER EXTREMITY ARTERIES:

There is complete occlusion of the superficial femoral artery. 



There is no flow seen within the popliteal and no flow demonstrated 

in the tibials or peroneal arteries. Collateral vessels can be seen 



NON-ANGIOGRAPHIC ASPECT OF THE EXAM:



LOWER THORAX:

Unremarkable. 



LIVER:

Unremarkable. No gross lesion or ductal dilatation. 



GALLBLADDER AND BILE DUCTS:

Unremarkable. 



PANCREAS:

Unremarkable. No gross lesion or ductal dilatation.



SPLEEN:

Unremarkable. 



ADRENALS:

Unremarkable. No mass. 



KIDNEYS AND URETERS:

Unremarkable. No hydronephrosis. No solid mass. 



STOMACH AND BOWEL:

Multiple dilated loops of small bowel are seen consistent with ileus 

or partial obstruction 



APPENDIX:

Normal appendix. 



PERITONEUM:

Unremarkable. No free fluid. No free air. 



LYMPH NODES:

Unremarkable. No enlarged lymph nodes. 



BLADDER:

Unremarkable. 



REPRODUCTIVE:

Unremarkable. 



BONES:

No acute fracture. 



OTHER FINDINGS:

None.



IMPRESSION:

There is complete occlusion of the superficial femoral artery 

bilaterally.   There is no flow seen within the popliteal and no flow 

demonstrated in the tibials or peroneal arteries. Collateral vessels 

can be seen . 



Multiple dilated loops of small bowel consistent with ileus or 

partial obstruction

## 2018-03-16 NOTE — PN
DATE:  03/16/2018



SUBJECTIVE:  The patient is seen in room 260, bed 1.  The patient is

sitting up in the bed.  The patient is alert, awake, responsive, having

breakfast.  The patient was seen sitting up in the bed.  The patient is

having breakfast.  Overnight nurse's notes were reviewed.



PHYSICAL EXAMINATION:

VITAL SIGNS:  T-max 97.9; heart rate 92, 80, 69; blood pressure 131/72,

127/69, 131/75; respiration 20; O2 sat 100%.

HEENT:  Head examination normocephalic, atraumatic.  HEENT examination

shows pink conjunctivae.  Anicteric sclerae.  No oropharyngeal lesion.  No

jugular venous distention.

CHEST:  Kyphosis.

LUNGS:  Shows decreased breath sound at the bases, right more than the

left.

CARDIOVASCULAR:  S1, S2, irregular rhythm.  Positive systolic murmur, left

sternal border, right second intercostal space, left second intercostal

space.

ABDOMEN:  Protuberant.  Positive bowel sound.

GENITALIA:  Male.

RECTAL:  Deferred.

EXTREMITY:  Shows chronic skin changes of the lower extremity.  Positive

decreased pulses of the lower extremity.  Trace swelling of the lower

extremity.

MUSCULOSKELETAL:  Shows a body mass index of 25.4.

NEUROLOGIC:  The patient is alert, awake, responsive, is able to move upper

and lower extremity without assistance.  Gait examination is not tested.



DIAGNOSTICS:  WBC 7.5, hemoglobin and hematocrit 13.8 and 41.0, platelet

267.  Sodium 130, potassium 4.0, chloride 94, CO2 of 25, anion gap 15, BUN

23, creatinine 1.1, GFR greater than 60, glucose 81, calcium 9.1, magnesium

1.9, total bili 1.6, AST 62, alk phos 141.  Chest x-ray shows right lower

lobe linear atelectasis.  No pleural effusion.  EKG was reviewed.



IMPRESSION AND PLAN:

1.  Right foot ischemia versus impending ischemia.

2.  Severe peripheral vascular disease.

3.  Right foot ischemic pain.

4.  Hyponatremia secondary to syndrome of inappropriate antidiuretic

hormone secondary to stage IV metastatic adenocarcinoma of the prostate.

5.  Atrial fibrillation, rate controlled.

6.  Nonischemic dilated cardiomyopathy.

7.  Stage IV adenocarcinoma of the prostate.

8.  Hypertension.

9.  Mild normocytic anemia.

10.  Prerenal kidney injury.

11.  Transaminitis and mild hyperbilirubinemia.

12.  Atrial fibrillation with anterolateral coronary ischemia.

13.  Right lower lobe linear atelectasis.

14.  Aortoiliac disease, right more than the left with 61 mm gradient

between the arm and the right low thigh pressure and 25 mm gradient between

the arm and the left low thigh pressure.

15.  Severely blunted calf peripheral vascular resistance waveform

consistent with bilateral superficial femoral artery disease.

16.  Flat ankle and metatarsal waveform.

17.  Severely abnormal resting ankle-brachial indices.

18.  Bilateral superficial femoral artery disease.

19.  Gait dysfunction.

20.  Deconditioning.

21.  Possible right foot right lower extremity cellulitis.

22.  Constipation.

23.  Dyslipidemia.

24.  Hypomagnesemia.



Plan at this time, [facility] 

The patient's CTA angio will be reviewed when available.  The patient is

awaiting evaluation by Vascular Surgery, Nephrology, Infectious Disease,

Podiatry and Interventional Radiology.  Consultations for above have been

requested.  Current medications:  Cardizem  mg daily, Casodex 50 mg

daily, Colace 100 mg three times a day, digoxin 0.125 daily, Ecotrin 81 mg

daily, heparin drip as per protocol, Lipitor 20 mg daily, Lopressor 50 mg

twice a day, magnesium oxide 400 mg twice a day, MiraLax 17 g three times a

day, Percocet 5/325 one tab q. 6 p.r.n., Protonix 40 mg daily, IV fluid 0.9

normal saline at 100 mL an hour, Tylenol 650 mg p.o. suppository q. 6

p.r.n., Xopenex nebulizer q. 6 hours round-the-clock q. 2 hours p.r.n.,

Zofran 4 mg p.o. q. 4 p.r.n.  The patient has been ordered out of bed.  The

patient is awaiting Interventional Radiology, Vascular Surgery, Infectious

Disease, Podiatry and Nephrology evaluation.  The patient updated about his

condition, diagnoses, overall guarded to poor prognosis, which he

acknowledged and understand.  All questions concerned answered.



Dictated and electronically signed, not read.





__________________________________________

Dale Márquez MD





DD:  03/16/2018 10:03:30

DT:  03/16/2018 10:10:11

[facility] # [id number]

## 2018-03-16 NOTE — RAD
HISTORY:

RLL PNEUMONIA  



COMPARISON:

03/07/2018



TECHNIQUE:

Chest PA and lateral



FINDINGS:



LUNGS:

There is linear atelectasis at the right lung base.



PLEURA:

No significant pleural effusion identified. No pneumothorax apparent.



CARDIOVASCULAR:

Mild cardiomegaly



OSSEOUS STRUCTURES:

No significant abnormalities.



VISUALIZED UPPER ABDOMEN:

Normal.



OTHER FINDINGS:

None.



IMPRESSION:

No active disease.

## 2018-03-16 NOTE — CP.PCM.CON
History of Present Illness





- History of Present Illness


History of Present Illness: 


77 year old male with PMH of prostate cancer with bone metastases, atrial 

fibrillation on anticoagulation, dilated cardiomyopathy, came in to [facility] [place] 

the nursing home because of severe right foot pain associated with wounds on 

the right foot. It was also noted that his right foot felt cold and that were 

no note of pulses. The patient denies fever or chills, no nausea or vomiting, 

no chest pain, no SOB, no headache or dizziness, no abdominal pain, no cough or 

colds, no diarrhea, no dysuria. Dopller ultrasound of hte lower extremities 

show severe PAD on the right leg. Infectious Diseases consult is requested to 

further evaluate and manage.





Review of Systems





- Review of Systems


All systems: reviewed and no additional remarkable complaints except (as per HPI

)





Past Patient History





- Infectious Disease


Hx of Infectious Diseases: None





- Tetanus Immunizations


Tetanus Immunization: Unknown





- Past Social History


Smoking Status: Former Smoker





- CARDIAC


Hx Cardiac Disorders: Yes (Afib)


Hx Atrial Fibrillation: Yes


Hx Congestive Heart Failure: Yes





- PULMONARY


Hx Respiratory Disorders: No





- NEUROLOGICAL


Hx Neurological Disorder: No





- HEENT


Hx HEENT Problems: No





- RENAL


Hx Chronic Kidney Disease: No





- ENDOCRINE/METABOLIC


Hx Endocrine Disorders: No





- HEMATOLOGICAL/ONCOLOGICAL


Hx Cancer: Yes





- INTEGUMENTARY


Hx Dermatological Problems: No





- MUSCULOSKELETAL/RHEUMATOLOGICAL


Hx Falls: No





- GASTROINTESTINAL


Hx Gastrointestinal Disorders: No





- GENITOURINARY/GYNECOLOGICAL


Hx Genitourinary Disorders: No


Hx Prostate Cancer: Yes





- PSYCHIATRIC


Hx Depression: No


Hx Emotional Abuse: No


Hx Physical Abuse: No


Hx Substance Use: No





- SURGICAL HISTORY


Hx Surgeries: No





- ANESTHESIA


Hx Anesthesia: No





Meds


Allergies/Adverse Reactions: 


 Allergies











Allergy/AdvReac Type Severity Reaction Status Date / Time


 


No Known Allergies Allergy   Verified 01/28/14 15:34














- Medications


Medications: 


 Current Medications





Acetaminophen (Tylenol 325mg Tab)  325 mg PO PRN PRN


   PRN Reason: Fever >100.4 F


Aspirin (Ecotrin)  81 mg PO DAILY [facility]


Atorvastatin Calcium (Lipitor)  20 mg PO DIN [facility]


Bicalutamide (Casodex)  50 mg PO DAILY [facility]


Digoxin (Digoxin)  0.125 mg PO DAILY [facility]


Diltiazem HCl (Cardizem Cd)  240 mg PO DAILY [facility]


Docusate Sodium (Colace)  100 mg PO TID [facility]


Sodium Chloride (Sodium Chloride 0.9%)  1,000 mls @ 100 mls/hr IV .Q10H [facility]


   Last Admin: 03/15/18 22:05 Dose:  100 mls/hr


Heparin Sodium/Sodium Chloride (Heparin 53906 Units/250ml 1/2 Normal Saline)  25

,000 units in 250 mls @ 9.906 mls/hr IV .Q24H [facility]; 12 UNITS/KG/HR


   PRN Reason: Protocol


   Last Admin: 03/16/18 04:55 Dose:  12 units/kg/hr, 9.906 mls/hr


Levalbuterol HCl (Xopenex)  0.63 mg IH [id number] [facility]


Levalbuterol HCl (Xopenex)  0.63 mg IH Q2H PRN


   PRN Reason: Shortness of Breath


Magnesium Oxide (Mag-Ox)  400 mg PO BID [facility]


Metoprolol Tartrate (Lopressor)  50 mg PO BID [facility]


Ondansetron HCl (Zofran Tab)  4 mg PO Q4 PRN


   PRN Reason: Nausea/Vomiting


Oxycodone/Acetaminophen (Percocet 5/325 Mg Tab)  1 tab PO Q6 PRN


   PRN Reason: Pain, severe (8-10)


   Stop: 03/19/18 00:01


Pantoprazole Sodium (Protonix Ec Tab)  40 mg PO DAILY [facility]


Polyethylene Glycol (Miralax)  17 gm PO BID [facility]











Physical Exam





- Constitutional


Appears: Non-toxic, Chronically Ill





- Head Exam


Head Exam: NORMAL INSPECTION





- ENT Exam


ENT Exam: Mucous Membranes Moist





- Neck Exam


Neck exam: Negative for: Meningismus





- Respiratory Exam


Respiratory Exam: Decreased Breath Sounds





- Cardiovascular Exam


Cardiovascular Exam: +S1, +S2





- GI/Abdominal Exam


GI & Abdominal Exam: Soft.  absent: Tenderness





- Extremities Exam


Additional comments: 





right foot with dressings in place, feels cold





Results





- Vital Signs


Recent Vital Signs: 


 Last Vital Signs











Temp  97.9 F   03/16/18 05:20


 


Pulse  80   03/16/18 05:20


 


Resp  20   03/16/18 05:20


 


BP  131/72   03/16/18 05:20


 


Pulse Ox  100   03/16/18 05:20














- Labs


Result Diagrams: 


 03/16/18 06:30





 03/16/18 06:30


Labs: 


 Laboratory Results - last 24 hr











  03/16/18





  01:15


 


APTT  38.0 H














Assessment & Plan





- Assessment and Plan (Free Text)


Plan: 





Assessment


Chronic right foot wounds associated with severe PAD


prostate cancer with bone metastases


atrial fibrillation on anticoagulation


dilated cardiomyopathy





Plan


Follow up blood and wound cx; patient has no signs of sepsis and severe PAD 

means systemic antibiotics may not reach the foot; started IV Vancomycin to 

decrease risk of infection spreading systemically; follow up plans of Surgery 

for revascularization or if not possible then amputation of foot


will monitor clinically

## 2018-03-17 LAB
ALBUMIN SERPL-MCNC: 2.7 G/DL (ref 3–4.8)
ALBUMIN/GLOB SERPL: 0.9 {RATIO} (ref 1.1–1.8)
ALT SERPL-CCNC: 51 U/L (ref 7–56)
APTT BLD: 51.8 SECONDS (ref 25.1–36.5)
AST SERPL-CCNC: 59 U/L (ref 17–59)
BUN SERPL-MCNC: 18 MG/DL (ref 7–21)
CALCIUM SERPL-MCNC: 8.7 MG/DL (ref 8.4–10.5)
ERYTHROCYTE [DISTWIDTH] IN BLOOD BY AUTOMATED COUNT: 15.2 % (ref 11.5–14.5)
GFR NON-AFRICAN AMERICAN: > 60
HGB BLD-MCNC: 11.8 G/DL (ref 14–18)
INR PPP: 1.7 (ref 0.93–1.08)
MCH RBC QN AUTO: 27.4 PG (ref 25–35)
MCHC RBC AUTO-ENTMCNC: 34 G/DL (ref 31–37)
MCV RBC AUTO: 80.7 FL (ref 80–105)
PLATELET # BLD: 289 10^3/UL (ref 120–450)
PMV BLD AUTO: 9.3 FL (ref 7–11)
PROTHROMBIN TIME: 19.8 SECONDS (ref 9.4–12.5)
RBC # BLD AUTO: 4.3 10^6/UL (ref 3.5–6.1)
WBC # BLD AUTO: 7.1 10^3/UL (ref 4.5–11)

## 2018-03-17 RX ADMIN — LEVALBUTEROL SCH MG: 0.63 SOLUTION RESPIRATORY (INHALATION) at 14:11

## 2018-03-17 RX ADMIN — LEVALBUTEROL SCH MG: 0.63 SOLUTION RESPIRATORY (INHALATION) at 20:03

## 2018-03-17 RX ADMIN — LEVALBUTEROL SCH MG: 0.63 SOLUTION RESPIRATORY (INHALATION) at 09:09

## 2018-03-17 RX ADMIN — HEPARIN SODIUM SCH MLS/HR: 10000 INJECTION, SOLUTION INTRAVENOUS at 03:08

## 2018-03-17 RX ADMIN — Medication SCH MG: at 18:02

## 2018-03-17 RX ADMIN — LEVALBUTEROL SCH: 0.63 SOLUTION RESPIRATORY (INHALATION) at 01:32

## 2018-03-17 RX ADMIN — VANCOMYCIN HYDROCHLORIDE SCH MLS/HR: 1 INJECTION, POWDER, LYOPHILIZED, FOR SOLUTION INTRAVENOUS at 18:03

## 2018-03-17 RX ADMIN — DILTIAZEM HYDROCHLORIDE SCH MG: 240 CAPSULE, COATED, EXTENDED RELEASE ORAL at 10:31

## 2018-03-17 RX ADMIN — PANTOPRAZOLE SODIUM SCH MG: 20 TABLET, DELAYED RELEASE ORAL at 10:40

## 2018-03-17 RX ADMIN — POLYETHYLENE GLYCOL 3350 SCH: 17 POWDER, FOR SOLUTION ORAL at 14:22

## 2018-03-17 RX ADMIN — VANCOMYCIN HYDROCHLORIDE SCH MLS/HR: 1 INJECTION, POWDER, LYOPHILIZED, FOR SOLUTION INTRAVENOUS at 03:57

## 2018-03-17 RX ADMIN — Medication SCH MG: at 10:31

## 2018-03-17 RX ADMIN — DIGOXIN SCH MG: 0.12 TABLET ORAL at 10:31

## 2018-03-17 RX ADMIN — POLYETHYLENE GLYCOL 3350 SCH GM: 17 POWDER, FOR SOLUTION ORAL at 18:02

## 2018-03-17 RX ADMIN — POLYETHYLENE GLYCOL 3350 SCH GM: 17 POWDER, FOR SOLUTION ORAL at 10:32

## 2018-03-17 NOTE — PN
DATE:  03/17/2018



SUBJECTIVE:  The patient seen in room early this morning in 260, bed 1.  No

fevers and chills.  No nausea.



PHYSICAL EXAMINATION:

VITAL SIGNS:  Temperature is 97, blood pressure is 130/70, respiratory rate

of 18, heart rate of 80.

HEENT:  Unremarkable.

NECK:  Supple.

HEART:  Normal S1 and S2.

ABDOMEN:  Soft, nontender.



LABORATORY EXAMINATION:  Reveals a white count of 7.1, hemoglobin of 11,

platelets of 289.  Coagulation is noted.  Chemistries reveals a BUN of 18,

creatinine of 1.0.  Microbiology reveals the blood cultures are negative. 

Foot cultures are no growth.



ASSESSMENT AND PLAN:  A 77-year-old male with past medical history of

prostate cancer with bone metastases, atrial fibrillation, on

anticoagulation, dilated cardiomyopathy and chronic right foot wound

associated with severe peripheral artery disease.  Currently on vancomycin.

Dr. Bernabe's consultation is noted from yesterday.  He states the patient

has gangrene of right foot secondary to severe peripheral arterial disease

and we will follow with you.





__________________________________________

Darrel Leblanc MD



DD:  03/17/2018 12:23:42

DT:  03/17/2018 12:25:32

[facility] # [id number]

## 2018-03-17 NOTE — CARD
--------------- APPROVED REPORT --------------





EKG Measurement

Heart [id number]

[id number]

[id number]

[id number]



<Conclusion>

Atrial fibrillation

PRWP

STTW changes c/w ischmia

## 2018-03-17 NOTE — RAD
PROCEDURE:  Right Foot Radiographs.



HISTORY:

gangrene right foot  



COMPARISON:

None.



FINDINGS:



BONES:

No evidence of acute fracture. Interval appearance of focal 

irregularity and erosion at the lateral aspect of the distal 1st 

metatarsal bone since the previous exam. 



JOINTS:

Arthritic degenerative changes. 



SOFT TISSUES:

No evidence of soft tissue pneumatosis. Foci of vascular 

calcifications seen P 



OTHER FINDINGS:

None.



IMPRESSION:

Focal cortical erosion at the lateral distal 1st metatarsal bone.  

The possibility of osteomyelitis cannot be excluded. .

## 2018-03-17 NOTE — CP.PCM.PN
Subjective





- Date & Time of Evaluation


Date of Evaluation: 03/17/18


Time of Evaluation: 14:00





- Subjective


Subjective: 





Nephrology Consultation Note





Assessment: 


PVD


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 

prostate CA


Hypertensive Chronic Kidney Disease (I12.9)


Chronic Kidney Disease (N18.3) Stage 3 with Cr 1.2 


metastatic prostate cancer, HTN (I12.9)


systolic CHF LVEF 20%


chronic indwelling kim catheter





Plan


Na stable,  on oral free water restriction 1000 mL per day; ok to keep lasix 


cr stable 


Hypertension control with meds


Monitor Input/Output


supplement electrolytes as needed


Further work up for as per primary team. 


CHF management as per cardiology








Physical Examination:      


General Appearance: Comfortable, in no acute respiratory distress, co-operative 

. 


Head; Atraumatic, normocephalic


ENT: no ulcers no thrush. Tongue is midline. Oropharynx: no rash or ulcers.


EYES: Eye muscles and extraocular movement intact. Sclera is anicteric.


Neck; supple no lymphadenopathy, no thyromegaly or bruit


Lungs: Normal respiratory rate/effort. Breath sounds  b/l present and clear


Heart: Normal rate. s1s2 normal. No rub or gallop. 


Extremities: no edema. No varicose veins. Rt foot cold to touch


Neurological: Patient is alert, awake and oriented to person, place and time. 

No focal deficit. Strength bilateral appropriate and equal


Skin: Warm and dry. Normal turgor. No rash. Palpitation: Normal elasticity for 

age.


Abdomen: Abdomen is soft. Bowel sounds +. There is no abdominal tenderness, no 

guarding/rigidity no organomegaly. abdomen distended


Psych: normal insight and normal affect/mood


MSK: no joint tenderness or swelling





Objective





- Vital Signs/Intake and Output


Vital Signs (last 24 hours): 


 











Temp Pulse Resp BP Pulse Ox


 


 97.6 F   63   18   124/70   100 


 


 03/17/18 17:16  03/17/18 17:16  03/17/18 17:16  03/17/18 22:08  03/17/18 17:16








Intake and Output: 


 











 03/17/18 03/18/18





 18:59 06:59


 


Intake Total 912 


 


Output Total 2100 


 


Balance -1188 














- Medications


Medications: 


 Current Medications





Acetaminophen (Tylenol 325mg Tab)  650 mg PO Q6 PRN


   PRN Reason: TEMP>=99.5F


Acetaminophen (Tylenol 325mg Tab)  650 mg PO Q6 PRN


   PRN Reason: Headache


Acetaminophen (Tylenol 650 Mg Supp)  650 mg RC Q6H PRN


   PRN Reason: Headache


Acetaminophen (Tylenol 650 Mg Supp)  650 mg RC Q6H PRN


   PRN Reason: TEMP>=99.5F,unable to take PO


Aspirin (Ecotrin)  81 mg PO DAILY [facility]


   Last Admin: 03/17/18 10:31 Dose:  81 mg


Atorvastatin Calcium (Lipitor)  20 mg PO DIN [facility]


   Last Admin: 03/17/18 18:02 Dose:  20 mg


Bicalutamide (Casodex)  50 mg PO DAILY [facility]


   Last Admin: 03/17/18 13:09 Dose:  50 mg


Bisacodyl (Dulcolax)  10 mg RC Q48H [facility]


   Last Admin: 03/17/18 10:41 Dose:  10 mg


Digoxin (Digoxin)  0.125 mg PO DAILY [facility]


   Last Admin: 03/17/18 10:31 Dose:  0.125 mg


Diltiazem HCl (Cardizem Cd)  240 mg PO DAILY [facility]


   Last Admin: 03/17/18 10:31 Dose:  240 mg


Docusate Sodium (Colace)  100 mg PO TID [facility]


   Last Admin: 03/17/18 18:02 Dose:  100 mg


Furosemide (Lasix)  20 mg IVP Q12 [facility]


   Last Admin: 03/17/18 22:08 Dose:  20 mg


Heparin Sodium/Sodium Chloride (Heparin 47392 Units/250ml 1/2 Normal Saline)  25

,000 units in 250 mls @ 9.906 mls/hr IV .Q24H [facility]; 12 UNITS/KG/HR


   PRN Reason: Protocol


   Last Admin: 03/17/18 03:08 Dose:  9 units/kg/hr, 7.43 mls/hr


Vancomycin HCl (Vancomycin 1gm)  1 gm in 250 mls @ 167 mls/hr IVPB Q12H [facility]


   PRN Reason: Protocol


   Last Admin: 03/17/18 18:03 Dose:  167 mls/hr


Levalbuterol HCl (Xopenex)  0.63 mg IH [id number] [facility]


   Last Admin: 03/17/18 20:03 Dose:  0.63 mg


Levalbuterol HCl (Xopenex)  0.63 mg IH Q2H PRN


   PRN Reason: Shortness of Breath


Magnesium Oxide (Mag-Ox)  400 mg PO BID [facility]


   Last Admin: 03/17/18 18:02 Dose:  400 mg


Metoprolol Tartrate (Lopressor)  50 mg PO BID [facility]


   Last Admin: 03/17/18 18:02 Dose:  50 mg


Ondansetron HCl (Zofran Tab)  4 mg PO Q4 PRN


   PRN Reason: Nausea/Vomiting


   Last Admin: 03/16/18 08:59 Dose:  4 mg


Oxycodone/Acetaminophen (Percocet 5/325 Mg Tab)  1 tab PO Q6 PRN


   PRN Reason: Pain, severe (8-10)


   Stop: 03/19/18 00:01


   Last Admin: 03/16/18 08:58 Dose:  1 tab


Pantoprazole Sodium (Protonix Ec Tab)  40 mg PO DAILY [facility]


   Last Admin: 03/17/18 10:40 Dose:  40 mg


Polyethylene Glycol (Miralax)  17 gm PO TID [facility]


   Last Admin: 03/17/18 18:02 Dose:  17 gm











- Labs


Labs: 


 





 03/17/18 06:00 





 03/17/18 06:00 





 











PT  19.8 SECONDS (9.4-12.5)  H  03/17/18  06:00    


 


INR  1.70  (0.93-1.08)  H  03/17/18  06:00    


 


APTT  51.8 Seconds (25.1-36.5)  H  03/17/18  06:00

## 2018-03-17 NOTE — CON
DATE:



HISTORY OF PRESENT ILLNESS:  A 77-year-old male seen at bedside for

consultation, evaluation and management of recent severe right foot pain

with severe discoloration.  The patient states that he noticed his foot was

becoming cold and then followed by pain and the patient was brought in from

nursing home.  The patient's medical history is significant for prostate

cancer with metastasis to the bone, atrial fibrillation, cardiomyopathy,

severe peripheral arterial disease of the right lower leg, congestive heart

failure.



SOCIAL HISTORY:  The patient was a smoker times 50 years.  He was a former

heavy drinker.  Denies illicit drug use.



FAMILY HISTORY:  Noncontributory.



PAST SURGICAL HISTORY:  Includes pleurocentesis and cystoscopy.



HOME MEDICATIONS:  Noted in MAR.



ALLERGIES:  THE PATIENT HAS NO KNOWN DRUG ALLERGIES.



PHYSICAL EXAMINATION:

VITAL SIGNS:  The patient's temperature is 98, pulse rate of 58, blood

pressure of 125/64, respiratory rate of 18.

EXTREMITIES:  Nonpalpable pedal pulses noted bilaterally.  Absent popliteal

pulses noted bilaterally.  Right lower extremity presents with gangrenous

changes to the entire forefoot.  There is noted to be skin sloughing on the

third digit as well as on the dorsal aspect of the foot.  The temperature

gradient is reversed.  There are noted to be superficial wounds on the

dorsal aspect of the foot as well as on the third digit.  Capillary filling

time is absent on all of the digits of the right foot.



LABORATORY DATA:  Reveal white count of 7.5, hemoglobin 13.8, hematocrit of

41, platelet count of 267.  Arterial Dopplers taken yesterday reveal

severely abnormal resting AB, aortoiliac disease, greater on the right than

the left and severe bilateral SFA disease.  The distal ankle and metatarsal

waveforms are flat consistent with arterial ischemia.



ASSESSMENT:  Gangrene of the right forefoot secondary to severe peripheral

arterial disease.



PLAN:  The patient was examined.  His legs and foot were cleansed with

normal sterile saline and application of Xeroform and a dry sterile

dressing was applied.  Dr. Campo's note was read and appreciated.  At this

point, the patient is a nonsurgical candidate and we will proceed with

palliative wound care going forward.  The patient will be seen and followed

daily.







__________________________________________

Law Bernabe DPM



DD:  03/16/2018 18:38:30

DT:  03/16/2018 18:42:46

[facility] # [id number]

## 2018-03-18 LAB
ALBUMIN SERPL-MCNC: 3 G/DL (ref 3–4.8)
ALBUMIN/GLOB SERPL: 0.9 {RATIO} (ref 1.1–1.8)
ALT SERPL-CCNC: 45 U/L (ref 7–56)
APTT BLD: 42 SECONDS (ref 25.1–36.5)
APTT BLD: 64.8 SECONDS (ref 25.1–36.5)
AST SERPL-CCNC: 54 U/L (ref 17–59)
BUN SERPL-MCNC: 16 MG/DL (ref 7–21)
CALCIUM SERPL-MCNC: 9.1 MG/DL (ref 8.4–10.5)
ERYTHROCYTE [DISTWIDTH] IN BLOOD BY AUTOMATED COUNT: 15.4 % (ref 11.5–14.5)
GFR NON-AFRICAN AMERICAN: > 60
HGB BLD-MCNC: 12.6 G/DL (ref 14–18)
INR PPP: 1.59 (ref 0.93–1.08)
INR PPP: 1.65 (ref 0.93–1.08)
MCH RBC QN AUTO: 27.4 PG (ref 25–35)
MCHC RBC AUTO-ENTMCNC: 34.2 G/DL (ref 31–37)
MCV RBC AUTO: 80 FL (ref 80–105)
PLATELET # BLD: 308 10^3/UL (ref 120–450)
PMV BLD AUTO: 9.2 FL (ref 7–11)
PROTHROMBIN TIME: 18.4 SECONDS (ref 9.4–12.5)
PROTHROMBIN TIME: 19.2 SECONDS (ref 9.4–12.5)
RBC # BLD AUTO: 4.6 10^6/UL (ref 3.5–6.1)
WBC # BLD AUTO: 7.8 10^3/UL (ref 4.5–11)

## 2018-03-18 RX ADMIN — POLYETHYLENE GLYCOL 3350 SCH: 17 POWDER, FOR SOLUTION ORAL at 10:16

## 2018-03-18 RX ADMIN — PANTOPRAZOLE SODIUM SCH MG: 20 TABLET, DELAYED RELEASE ORAL at 10:12

## 2018-03-18 RX ADMIN — LEVALBUTEROL SCH MG: 0.63 SOLUTION RESPIRATORY (INHALATION) at 21:05

## 2018-03-18 RX ADMIN — DILTIAZEM HYDROCHLORIDE SCH MG: 240 CAPSULE, COATED, EXTENDED RELEASE ORAL at 10:11

## 2018-03-18 RX ADMIN — LEVALBUTEROL SCH MG: 0.63 SOLUTION RESPIRATORY (INHALATION) at 13:03

## 2018-03-18 RX ADMIN — LEVALBUTEROL SCH MG: 0.63 SOLUTION RESPIRATORY (INHALATION) at 07:30

## 2018-03-18 RX ADMIN — Medication SCH MG: at 10:16

## 2018-03-18 RX ADMIN — Medication SCH MG: at 17:13

## 2018-03-18 RX ADMIN — LEVALBUTEROL SCH: 0.63 SOLUTION RESPIRATORY (INHALATION) at 01:33

## 2018-03-18 RX ADMIN — POLYETHYLENE GLYCOL 3350 SCH: 17 POWDER, FOR SOLUTION ORAL at 17:14

## 2018-03-18 RX ADMIN — DIGOXIN SCH MG: 0.12 TABLET ORAL at 10:12

## 2018-03-18 RX ADMIN — VANCOMYCIN HYDROCHLORIDE SCH MLS/HR: 1 INJECTION, POWDER, LYOPHILIZED, FOR SOLUTION INTRAVENOUS at 04:37

## 2018-03-18 RX ADMIN — HEPARIN SODIUM SCH: 10000 INJECTION, SOLUTION INTRAVENOUS at 06:15

## 2018-03-18 RX ADMIN — POLYETHYLENE GLYCOL 3350 SCH: 17 POWDER, FOR SOLUTION ORAL at 13:04

## 2018-03-18 RX ADMIN — VANCOMYCIN HYDROCHLORIDE SCH MLS/HR: 1 INJECTION, POWDER, LYOPHILIZED, FOR SOLUTION INTRAVENOUS at 17:12

## 2018-03-18 NOTE — PN
DATE:  03/18/2018



SUBJECTIVE:  Patient is seen in room 260, bed 1.  Patient is out of bed to

chair.  Patient is alert, awake, responsive.  Patient does not appear to be

in any distress.  Overnight nurse's notes were reviewed.



PHYSICAL EXAMINATION:

VITAL SIGNS:  T-max 97.9, telemetry shows atrial fibrillation, heart rate

68, blood pressure 116/70.  O2 sat 95% to 98%.

HEENT:    Head examination, normocephalic, atraumatic.  HEENT examination

shows pinkish conjunctivae.  Anicteric sclerae.  No oropharyngeal lesion. 

No neck rigidity.

CHEST:  Kyphosis.

LUNGS:  Shows questionable decreased breath sounds at the bases, left more

than the right.

CARDIOVASCULAR:  S1, S2, irregular rhythm.  Positive systolic murmur left

sternal border, right second intercostal space, left second intercostal

space.

ABDOMEN:  Less protuberant today.  Positive bowel sound.  No

hepatosplenomegaly noted.

GENITALIA:  Male.  Positive Fraire catheter.

EXTREMITIES:   Shows positive dressing of the right foot and leg.  Positive

cool right foot and leg.  Left lower extremity shows warm foot, but

nonpalpable pulses.

MUSCULOSKELETAL:  Shows a body mass index of 25.

NEUROLOGIC:  Patient is alert, awake, oriented to person, place, and not

oriented to year, date, month.



DIAGNOSTICS:  WBC 7.8, hemoglobin/hematocrit 12.6 and 36.8, platelet count

is 230.  Sodium 130, potassium is 4.1, chloride 93, CO2 25, BUN 16,

creatinine 1.1, glucose 82.  PT 19.2, PTT 65.  Patient had an x-ray of the

right foot, which shows focal erosions on the first metatarsal,

questionable osteomyelitis.



IMPRESSION AND PLAN:

1.  Questionable and possible right foot first metatarsal focal erosion,

possible osteomyelitis.

2.  Intermittent confusional state.

3.  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

urinary retention.

4.  Normocytic anemia.

5.  Hyponatremia.

6.  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

adenocarcinoma of the prostate with bone metastasis.

7.  Atrial fibrillation, Eliquis dependent.

8.  Right foot gangrene with severe bilateral peripheral vascular disease

with bilateral superficial femoral artery occlusion.

9.  Urinary retention with chronic indwelling Fraire catheter.

10.  Deconditioning.

11.  Gait dysfunction.

12.  Questionable ileus versus partial small bowel obstruction.

13.  History of hypertension.

14.  History of poor compliance.

15.  History of atrial fibrillation with rapid ventricle response.

16.  Constipation.

17.  Dilated nonischemic cardiomyopathy with ejection fraction of 20%.

18.  Dyslipidemia.

19.  History of right lower lobe atelectasis, pneumonia and pleural

effusion.



Plan at this time, patient's current medications are as follows:  Cardizem

 mg daily, Casodex 50 mg daily, Colace 100 mg three times a day,

digoxin 0.125 mg daily, Dulcolax suppository 10 mg every 48 hours, aspirin

81 mg daily, heparin drip as per protocol, Lasix 20 mg IV q. 12, Lipitor 20

mg daily, Lopressor 50 mg twice a day, MiraLax 17 g three times a day,

Percocet 5/325 one tab q. 6 p.r.n., Protonix 40 mg daily, Tylenol 650 q. 4

p.r.n. suppository or p.o., vancomycin 1 g IV q. 12, Xopenex nebulizer 0.63

mg every 6 hours round the clock q. 12 p.r.n., Zofran 4 mg p.o. q. 4 hours

p.r.n.



Patient is to be continued on the above therapeutic intervention.  Patient

was seen by Nephrology, Infectious Disease, Vascular Surgery,

Interventional Radiology.  Their recommendation is patient's prognosis is

extremely poor and recommend palliative care.  Palliative Care consultation

ordered.  Awaiting further evaluation and recommendation by Palliative Care

and further disposition.



Dictated and electronically signed, not read.





__________________________________________

Dale Márquez MD



DD:  03/18/2018 14:46:26

DT:  03/18/2018 14:50:13

Job # [id number]

## 2018-03-18 NOTE — PN
DATE:  03/18/2018



SUBJECTIVE:  The patient is in bed, in no acute distress, nontoxic.



PHYSICAL EXAMINATION:

VITAL SIGNS:  Temperature is 97, blood pressure is 120/60, respiratory rate

of 18.

HEENT:  Unremarkable.

NECK:  Supple.

LUNGS:  Have decreased breath sounds.

HEART:  Normal S1 and S2.

ABDOMEN:  Soft, nontender.



LABORATORY DATA:  Reveals a white count of 7.8, hemoglobin of 12, platelets

of 308.  Coagulation is noted.  BUN of 16, creatinine of 1.1.  Microbiology

reveals the blood cultures are negative.  The right foot cultures are

negative and also of note is the patient's sodium is 130.



Review of orders reveal focal cortical erosion, possibly osteomyelitis.



ASSESSMENT AND PLAN:  This is a 77-year-old male with past medical history

of prostate cancer with bone metastases; atrial fibrillation, on

anticoagulation; dilated cardiomyopathy; chronic right foot wound

associated with severe peripheral artery disease and currently on

vancomycin.  We will follow closely with you.







__________________________________________

Darrel Leblanc MD



DD:  03/18/2018 9:48:27

DT:  03/18/2018 9:50:20

Job # [id number]

## 2018-03-18 NOTE — CP.PCM.PN
<Lori Small - Last Filed: 03/18/18 13:27>





Subjective





- Date & Time of Evaluation


Date of Evaluation: 03/18/18


Time of Evaluation: 13:27





- Subjective


Subjective: 


Podiatry Progress Note - Dr. Miller





78 y/o male seen at bedside this morning for right foot superficial ulcerations 

with ischemic skin changes. Pt resting comfortably in bedside chair at time of 

visit. States that the pain in the leg is well managed but that it still feels 

sore. Denies any acute events overnight. Admits to one episode of vomiting at 

dinnertime last night. Denies F/C/N/CP/SOB. 








Objective





- Vital Signs/Intake and Output


Vital Signs (last 24 hours): 


 











Temp Pulse Resp BP Pulse Ox


 


 97.9 F   68   18   116/70   97 


 


 03/18/18 11:51  03/18/18 11:51  03/18/18 11:51  03/18/18 11:51  03/18/18 06:00








Intake and Output: 


 











 03/18/18 03/18/18





 06:59 18:59


 


Intake Total 518 


 


Output Total 950 


 


Balance -432 














- Medications


Medications: 


 Current Medications





Acetaminophen (Tylenol 325mg Tab)  650 mg PO Q6 PRN


   PRN Reason: TEMP>=99.5F


Acetaminophen (Tylenol 325mg Tab)  650 mg PO Q6 PRN


   PRN Reason: Headache


Acetaminophen (Tylenol 650 Mg Supp)  650 mg RC Q6H PRN


   PRN Reason: Headache


Acetaminophen (Tylenol 650 Mg Supp)  650 mg RC Q6H PRN


   PRN Reason: TEMP>=99.5F,unable to take PO


Aspirin (Ecotrin)  81 mg PO DAILY [facility]


   Last Admin: 03/18/18 10:15 Dose:  81 mg


Atorvastatin Calcium (Lipitor)  20 mg PO DIN [facility]


   Last Admin: 03/17/18 18:02 Dose:  20 mg


Bicalutamide (Casodex)  50 mg PO DAILY [facility]


   Last Admin: 03/18/18 11:45 Dose:  50 mg


Bisacodyl (Dulcolax)  10 mg RC Q48H [facility]


   Last Admin: 03/17/18 10:41 Dose:  10 mg


Digoxin (Digoxin)  0.125 mg PO DAILY [facility]


   Last Admin: 03/18/18 10:12 Dose:  0.125 mg


Diltiazem HCl (Cardizem Cd)  240 mg PO DAILY [facility]


   Last Admin: 03/18/18 10:11 Dose:  240 mg


Docusate Sodium (Colace)  100 mg PO TID [facility]


   Last Admin: 03/18/18 13:04 Dose:  Not Given


Furosemide (Lasix)  20 mg IVP Q12 [facility]


   Last Admin: 03/18/18 10:11 Dose:  20 mg


Heparin Sodium/Sodium Chloride (Heparin 07174 Units/250ml 1/2 Normal Saline)  25

,000 units in 250 mls @ 9.906 mls/hr IV .Q24H [facility]; 12 UNITS/KG/HR


   PRN Reason: Protocol


   Last Admin: 03/18/18 06:15 Dose:  Not Given


Vancomycin HCl (Vancomycin 1gm)  1 gm in 250 mls @ 167 mls/hr IVPB Q12H [facility]


   PRN Reason: Protocol


   Last Admin: 03/18/18 04:37 Dose:  167 mls/hr


Levalbuterol HCl (Xopenex)  0.63 mg IH [id number] [facility]


   Last Admin: 03/18/18 13:03 Dose:  0.63 mg


Levalbuterol HCl (Xopenex)  0.63 mg IH Q2H PRN


   PRN Reason: Shortness of Breath


Magnesium Oxide (Mag-Ox)  400 mg PO BID [facility]


   Last Admin: 03/18/18 10:16 Dose:  400 mg


Metoprolol Tartrate (Lopressor)  50 mg PO BID [facility]


   Last Admin: 03/18/18 10:10 Dose:  50 mg


Ondansetron HCl (Zofran Tab)  4 mg PO Q4 PRN


   PRN Reason: Nausea/Vomiting


   Last Admin: 03/16/18 08:59 Dose:  4 mg


Oxycodone/Acetaminophen (Percocet 5/325 Mg Tab)  1 tab PO Q6 PRN


   PRN Reason: Pain, severe (8-10)


   Stop: 03/19/18 00:01


   Last Admin: 03/16/18 08:58 Dose:  1 tab


Pantoprazole Sodium (Protonix Ec Tab)  40 mg PO DAILY [facility]


   Last Admin: 03/18/18 10:12 Dose:  40 mg


Polyethylene Glycol (Miralax)  17 gm PO TID [facility]


   Last Admin: 03/18/18 13:04 Dose:  Not Given











- Labs


Labs: 


 





 03/18/18 06:00 





 03/18/18 06:00 





 











PT  19.2 SECONDS (9.4-12.5)  H  03/18/18  08:00    


 


INR  1.65  (0.93-1.08)  H  03/18/18  08:00    


 


APTT  64.8 Seconds (25.1-36.5)  H  03/18/18  08:00    














- Constitutional


Appears: Well, Non-toxic, No Acute Distress





- Extremities Exam


Additional comments: 


Right lower extremity focused examination:


VASC: DP/PT pulses non-palpable. Ischemic skin changes noted with skin 

sloughing and shiny, scaly skin diffusely. Skin temperature cool to touch 

surrounding right foot. CFT absent to all digits. 


Derm: Superficial diffuse ulcerations noted with peripheral skin sloughing. No 

active drainage, purulence, no malodor, no fluctuance. 


Neuro: Protective sensation grossly intact


Ortho: Mild-moderate tenderness to palpation of diffuse right foot








- Neurological Exam


Neurological Exam: Alert, Awake, Oriented x3





- Psychiatric Exam


Psychiatric exam: Normal Affect, Normal Mood





Assessment and Plan





- Assessment and Plan (Free Text)


Assessment: 


78 y/o male with gangrene of right foot with severe PAD


Plan: 


Pt seen and examined at bedside


Discussed with attending Dr. Miller


Cleansed ulcerations with saline


Xeroform and DSD applied to RLE


Wound culture of R foot pending


Continue IV Vancomycin


Pt to continue with local wound care - no surgical intervention planned at this 

time


Podiatry will continue to follow while in house








<Helen Miller - Last Filed: 03/18/18 16:00>





Objective





- Vital Signs/Intake and Output


Vital Signs (last 24 hours): 


 











Temp Pulse Resp BP Pulse Ox


 


 97.9 F   58 L  18   116/70   97 


 


 03/18/18 11:51  03/18/18 14:00  03/18/18 11:51  03/18/18 11:51  03/18/18 06:00








Intake and Output: 


 











 03/18/18 03/18/18





 06:59 18:59


 


Intake Total 518 


 


Output Total 950 


 


Balance -432 














- Medications


Medications: 


 Current Medications





Acetaminophen (Tylenol 325mg Tab)  650 mg PO Q6 PRN


   PRN Reason: TEMP>=99.5F


Acetaminophen (Tylenol 325mg Tab)  650 mg PO Q6 PRN


   PRN Reason: Headache


Acetaminophen (Tylenol 650 Mg Supp)  650 mg RC Q6H PRN


   PRN Reason: Headache


Acetaminophen (Tylenol 650 Mg Supp)  650 mg RC Q6H PRN


   PRN Reason: TEMP>=99.5F,unable to take PO


Aspirin (Ecotrin)  81 mg PO DAILY [facility]


   Last Admin: 03/18/18 10:15 Dose:  81 mg


Atorvastatin Calcium (Lipitor)  20 mg PO DIN [facility]


   Last Admin: 03/17/18 18:02 Dose:  20 mg


Bicalutamide (Casodex)  50 mg PO DAILY [facility]


   Last Admin: 03/18/18 11:45 Dose:  50 mg


Bisacodyl (Dulcolax)  10 mg RC Q48H [facility]


   Last Admin: 03/17/18 10:41 Dose:  10 mg


Digoxin (Digoxin)  0.125 mg PO DAILY [facility]


   Last Admin: 03/18/18 10:12 Dose:  0.125 mg


Diltiazem HCl (Cardizem Cd)  240 mg PO DAILY [facility]


   Last Admin: 03/18/18 10:11 Dose:  240 mg


Docusate Sodium (Colace)  100 mg PO TID [facility]


   Last Admin: 03/18/18 13:04 Dose:  Not Given


Furosemide (Lasix)  20 mg IVP Q12 [facility]


   Last Admin: 03/18/18 10:11 Dose:  20 mg


Heparin Sodium/Sodium Chloride (Heparin 50494 Units/250ml 1/2 Normal Saline)  25

,000 units in 250 mls @ 9.906 mls/hr IV .Q24H [facility]; 12 UNITS/KG/HR


   PRN Reason: Protocol


   Last Admin: 03/18/18 06:15 Dose:  Not Given


Vancomycin HCl (Vancomycin 1gm)  1 gm in 250 mls @ 167 mls/hr IVPB Q12H [facility]


   PRN Reason: Protocol


   Last Admin: 03/18/18 04:37 Dose:  167 mls/hr


Levalbuterol HCl (Xopenex)  0.63 mg IH [id number] [facility]


   Last Admin: 03/18/18 13:03 Dose:  0.63 mg


Levalbuterol HCl (Xopenex)  0.63 mg IH Q2H PRN


   PRN Reason: Shortness of Breath


Magnesium Oxide (Mag-Ox)  400 mg PO BID [facility]


   Last Admin: 03/18/18 10:16 Dose:  400 mg


Metoprolol Tartrate (Lopressor)  50 mg PO BID [facility]


   Last Admin: 03/18/18 10:10 Dose:  50 mg


Ondansetron HCl (Zofran Tab)  4 mg PO Q4 PRN


   PRN Reason: Nausea/Vomiting


   Last Admin: 03/16/18 08:59 Dose:  4 mg


Oxycodone/Acetaminophen (Percocet 5/325 Mg Tab)  1 tab PO Q6 PRN


   PRN Reason: Pain, severe (8-10)


   Stop: 03/19/18 00:01


   Last Admin: 03/16/18 08:58 Dose:  1 tab


Pantoprazole Sodium (Protonix Ec Tab)  40 mg PO DAILY [facility]


   Last Admin: 03/18/18 10:12 Dose:  40 mg


Polyethylene Glycol (Miralax)  17 gm PO TID [facility]


   Last Admin: 03/18/18 13:04 Dose:  Not Given











- Labs


Labs: 


 





 03/18/18 06:00 





 03/18/18 06:00 





 











PT  19.2 SECONDS (9.4-12.5)  H  03/18/18  08:00    


 


INR  1.65  (0.93-1.08)  H  03/18/18  08:00    


 


APTT  64.8 Seconds (25.1-36.5)  H  03/18/18  08:00    














Attending/Attestation





- Attestation


I have personally seen and examined this patient.: Yes


I have fully participated in the care of the patient.: Yes


I have reviewed all pertinent clinical information, including history, physical 

exam and plan: Yes

## 2018-03-18 NOTE — PN
DATE:



SUBJECTIVE:  A 77-year-old male seen at bedside for evaluation and

management of ischemic changes to his right forefoot.  The patient is

stating he is still having pain in his right forefoot.  The patient's vital

signs revealed temperature of 98.1, pulse rate of 60, blood pressure of

130/73, respiratory rate of 20.  Laboratory findings reveal a white count

of 7.1, hemoglobin of 11.8, hematocrit of 34.7, platelet count of 289. 

X-rays taken of the right foot today with read by myself revealed no

radiographic changes at the forefoot consistent with osteomyelitis. 

However, there is Monckeberg's calcifications noted throughout the foot and

ankle.



OBJECTIVE:  Nonpalpable pedal pulses noted bilaterally.  Absent popliteal

pulses noted bilaterally.  There are ischemic changes to the entire

forefoot with skin sloughing on the third digit as well as continuous

sloughing on the dorsal aspect of the right foot.  Temperature gradient is

reversed and the foot is cold to the touch.  Capillary filling time is

absent at all the digits of the right foot.  Clinical signs of impending

ischemia and gangrene are evident.



ASSESSMENT:  Gangrene of the right foot secondary to severe peripheral

arterial disease.



PLAN:  The patient was examined.  His wounds were cleansed with normal

sterile saline and application of Xeroform and a dry sterile dressing was

applied.  The patient is not a surgical candidate for vascular reperfusion

and subsequently is not a surgical candidate for pedal amputations as his

wounds would not heal and only go on to exacerbate his already ischemic

limb.  At this point, we will continue with palliative wound care daily.





__________________________________________

Law Bernabe DPM

 



DD:  03/17/2018 10:45:35

DT:  03/17/2018 10:49:16

Job # [id number]

## 2018-03-19 LAB
APTT BLD: 36.4 SECONDS (ref 25.1–36.5)
INR PPP: 1.59 (ref 0.93–1.08)
PROTHROMBIN TIME: 18.5 SECONDS (ref 9.4–12.5)

## 2018-03-19 RX ADMIN — VANCOMYCIN HYDROCHLORIDE SCH MLS/HR: 1 INJECTION, POWDER, LYOPHILIZED, FOR SOLUTION INTRAVENOUS at 05:48

## 2018-03-19 RX ADMIN — DIGOXIN SCH MG: 0.12 TABLET ORAL at 09:17

## 2018-03-19 RX ADMIN — LEVALBUTEROL SCH MG: 0.63 SOLUTION RESPIRATORY (INHALATION) at 07:44

## 2018-03-19 RX ADMIN — VANCOMYCIN HYDROCHLORIDE SCH MLS/HR: 1 INJECTION, POWDER, LYOPHILIZED, FOR SOLUTION INTRAVENOUS at 15:22

## 2018-03-19 RX ADMIN — LEVALBUTEROL SCH MG: 0.63 SOLUTION RESPIRATORY (INHALATION) at 13:46

## 2018-03-19 RX ADMIN — LEVALBUTEROL SCH MG: 0.63 SOLUTION RESPIRATORY (INHALATION) at 20:47

## 2018-03-19 RX ADMIN — Medication SCH MG: at 09:22

## 2018-03-19 RX ADMIN — Medication SCH MG: at 17:12

## 2018-03-19 RX ADMIN — POLYETHYLENE GLYCOL 3350 SCH GM: 17 POWDER, FOR SOLUTION ORAL at 09:22

## 2018-03-19 RX ADMIN — DILTIAZEM HYDROCHLORIDE SCH MG: 240 CAPSULE, COATED, EXTENDED RELEASE ORAL at 09:21

## 2018-03-19 RX ADMIN — POLYETHYLENE GLYCOL 3350 SCH: 17 POWDER, FOR SOLUTION ORAL at 14:05

## 2018-03-19 RX ADMIN — HEPARIN SODIUM SCH MLS/HR: 10000 INJECTION, SOLUTION INTRAVENOUS at 08:44

## 2018-03-19 RX ADMIN — POLYETHYLENE GLYCOL 3350 SCH: 17 POWDER, FOR SOLUTION ORAL at 17:16

## 2018-03-19 RX ADMIN — LEVALBUTEROL SCH: 0.63 SOLUTION RESPIRATORY (INHALATION) at 07:44

## 2018-03-19 RX ADMIN — PANTOPRAZOLE SODIUM SCH MG: 20 TABLET, DELAYED RELEASE ORAL at 09:17

## 2018-03-19 NOTE — CP.PCM.PN
Subjective





- Date & Time of Evaluation


Date of Evaluation: 03/19/18


Time of Evaluation: 15:22





- Subjective


Subjective: 


Podiatry Progress Note - Dr. Miller





78 y/o male seen at bedside today for right foot superficial ulcerations with 

ischemic skin changes. Pt resting comfortably in bedside chair at time of 

visit. Pt is lethargic upon visit. Admits to pain that comes and goes to the 

right leg and foot. Denies any acute events overnight. Denies F/C/N/V/CP/SOB as 

of today. 

















Objective





- Vital Signs/Intake and Output


Vital Signs (last 24 hours): 


 











Temp Pulse Resp BP Pulse Ox


 


 97.8 F   71   20   117/68   97 


 


 03/19/18 11:47  03/19/18 11:47  03/19/18 11:47  03/19/18 11:47  03/19/18 06:00








Intake and Output: 


 











 03/19/18 03/19/18





 06:59 18:59


 


Intake Total 418 


 


Output Total 1300 


 


Balance -882 














- Medications


Medications: 


 Current Medications





Acetaminophen (Tylenol 325mg Tab)  650 mg PO Q6 PRN


   PRN Reason: TEMP>=99.5F


Acetaminophen (Tylenol 325mg Tab)  650 mg PO Q6 PRN


   PRN Reason: Headache


Acetaminophen (Tylenol 650 Mg Supp)  650 mg RC Q6H PRN


   PRN Reason: Headache


Acetaminophen (Tylenol 650 Mg Supp)  650 mg RC Q6H PRN


   PRN Reason: TEMP>=99.5F,unable to take PO


Aspirin (Ecotrin)  81 mg PO DAILY [facility]


   Last Admin: 03/19/18 09:17 Dose:  81 mg


Atorvastatin Calcium (Lipitor)  20 mg PO DIN [facility]


Bicalutamide (Casodex)  50 mg PO DAILY [facility]


   Last Admin: 03/19/18 11:52 Dose:  50 mg


Bisacodyl (Dulcolax)  10 mg RC Q48H [facility]


   Last Admin: 03/19/18 11:40 Dose:  10 mg


Digoxin (Digoxin)  0.125 mg PO DAILY [facility]


   Last Admin: 03/19/18 09:17 Dose:  0.125 mg


Diltiazem HCl (Cardizem Cd)  240 mg PO DAILY [facility]


   Last Admin: 03/19/18 09:21 Dose:  240 mg


Docusate Sodium (Colace)  100 mg PO TID [facility]


   Last Admin: 03/19/18 14:05 Dose:  Not Given


Furosemide (Lasix)  20 mg IVP Q12 [facility]


   Last Admin: 03/19/18 09:22 Dose:  20 mg


Heparin Sodium/Sodium Chloride (Heparin 27731 Units/250ml 1/2 Normal Saline)  25

,000 units in 250 mls @ 9.906 mls/hr IV .Q24H [facility]; 12 UNITS/KG/HR


   PRN Reason: Protocol


   Last Admin: 03/19/18 08:44 Dose:  11 units/kg/hr, 9.081 mls/hr


Vancomycin HCl (Vancomycin 1gm)  1 gm in 250 mls @ 167 mls/hr IVPB Q12H [facility]


   PRN Reason: Protocol


   Last Admin: 03/19/18 05:48 Dose:  167 mls/hr


Levalbuterol HCl (Xopenex)  0.63 mg IH [id number] [facility]


   Last Admin: 03/19/18 13:46 Dose:  0.63 mg


Levalbuterol HCl (Xopenex)  0.63 mg IH Q2H PRN


   PRN Reason: Shortness of Breath


Magnesium Oxide (Mag-Ox)  400 mg PO BID [facility]


   Last Admin: 03/19/18 09:22 Dose:  400 mg


Metoprolol Tartrate (Lopressor)  50 mg PO BID [facility]


   Last Admin: 03/19/18 09:22 Dose:  50 mg


Mupirocin (Bactroban Ointment)  0 gm TOP BID [facility]


Ondansetron HCl (Zofran Tab)  4 mg PO Q4 PRN


   PRN Reason: Nausea/Vomiting


   Last Admin: 03/16/18 08:59 Dose:  4 mg


Pantoprazole Sodium (Protonix Ec Tab)  40 mg PO DAILY [facility]


   Last Admin: 03/19/18 09:17 Dose:  40 mg


Polyethylene Glycol (Miralax)  17 gm PO TID [facility]


   Last Admin: 03/19/18 14:05 Dose:  Not Given











- Labs


Labs: 


 





 03/18/18 06:00 





 03/18/18 06:00 





 











PT  18.5 SECONDS (9.4-12.5)  H  03/19/18  05:30    


 


INR  1.59  (0.93-1.08)  H  03/19/18  05:30    


 


APTT  102.0 Seconds (25.1-36.5)  H*  03/19/18  13:20    














- Constitutional


Appears: Well, Non-toxic, No Acute Distress





- Extremities Exam


Additional comments: 


Right lower extremity focused examination:


VASC: DP/PT pulses non-palpable. Ischemic skin changes noted with skin 

sloughing and shiny skin diffusely. Skin temperature cool to touch surrounding 

right foot. CFT absent to all digits. 


Derm: Superficial diffuse ulcerations noted with peripheral skin sloughing. No 

active drainage, purulence, no malodor, no fluctuance. 


Neuro: Protective sensation grossly intact


Ortho: Mild-moderate tenderness to palpation to entirety of right lower 

extremity at level of ankle and foot














- Neurological Exam


Neurological Exam: Alert, Awake, Oriented x3





- Psychiatric Exam


Psychiatric exam: Normal Affect, Normal Mood





Assessment and Plan





- Assessment and Plan (Free Text)


Assessment: 


78 y/o male with pulseless cold right foot with severe PAD





Plan: 


Pt seen and examined at bedside


Discussed with attending Dr. Miller


Cleansed ulcerations with saline


Optifoam applied to dorsal foot wounds; adaptic, ABD and DSD applied to 

anterior distal right leg wound


Wound culture of R foot pending - prelim no growth


Continue IV Vancomycin


Pt to continue with local wound care - no surgical intervention planned at this 

time


Podiatry will continue to follow while in house

## 2018-03-19 NOTE — CP.PCM.PN
Subjective





- Date & Time of Evaluation


Date of Evaluation: 03/19/18


Time of Evaluation: 10:50





- Subjective


Subjective: 





Comfortable in bed, no fevers, not in distress.





Objective





- Vital Signs/Intake and Output


Vital Signs (last 24 hours): 


 











Temp Pulse Resp BP Pulse Ox


 


 97.7 F   93 H  19   137/75   97 


 


 03/19/18 06:00  03/19/18 06:00  03/19/18 06:00  03/19/18 06:00  03/19/18 06:00








Intake and Output: 


 











 03/19/18 03/19/18





 06:59 18:59


 


Intake Total 418 


 


Output Total 1300 


 


Balance -882 














- Medications


Medications: 


 Current Medications





Acetaminophen (Tylenol 325mg Tab)  650 mg PO Q6 PRN


   PRN Reason: TEMP>=99.5F


Acetaminophen (Tylenol 325mg Tab)  650 mg PO Q6 PRN


   PRN Reason: Headache


Acetaminophen (Tylenol 650 Mg Supp)  650 mg RC Q6H PRN


   PRN Reason: Headache


Acetaminophen (Tylenol 650 Mg Supp)  650 mg RC Q6H PRN


   PRN Reason: TEMP>=99.5F,unable to take PO


Aspirin (Ecotrin)  81 mg PO DAILY [facility]


   Last Admin: 03/18/18 10:15 Dose:  81 mg


Atorvastatin Calcium (Lipitor)  20 mg PO DIN [facility]


Bicalutamide (Casodex)  50 mg PO DAILY [facility]


   Last Admin: 03/18/18 11:45 Dose:  50 mg


Bisacodyl (Dulcolax)  10 mg RC Q48H [facility]


   Last Admin: 03/17/18 10:41 Dose:  10 mg


Digoxin (Digoxin)  0.125 mg PO DAILY [facility]


   Last Admin: 03/18/18 10:12 Dose:  0.125 mg


Diltiazem HCl (Cardizem Cd)  240 mg PO DAILY [facility]


   Last Admin: 03/18/18 10:11 Dose:  240 mg


Docusate Sodium (Colace)  100 mg PO TID [facility]


   Last Admin: 03/18/18 17:13 Dose:  Not Given


Furosemide (Lasix)  20 mg IVP Q12 [facility]


   Last Admin: 03/18/18 22:14 Dose:  20 mg


Heparin Sodium/Sodium Chloride (Heparin 04010 Units/250ml 1/2 Normal Saline)  25

,000 units in 250 mls @ 9.906 mls/hr IV .Q24H [facility]; 12 UNITS/KG/HR


   PRN Reason: Protocol


   Last Admin: 03/19/18 08:44 Dose:  11 units/kg/hr, 9.081 mls/hr


Vancomycin HCl (Vancomycin 1gm)  1 gm in 250 mls @ 167 mls/hr IVPB Q12H [facility]


   PRN Reason: Protocol


   Last Admin: 03/19/18 05:48 Dose:  167 mls/hr


Levalbuterol HCl (Xopenex)  0.63 mg IH [id number] [facility]


   Last Admin: 03/19/18 07:44 Dose:  0.63 mg


Levalbuterol HCl (Xopenex)  0.63 mg IH Q2H [facility]N


   PRN Reason: Shortness of Breath


Magnesium Oxide (Mag-Ox)  400 mg PO BID [facility]


   Last Admin: 03/18/18 17:13 Dose:  400 mg


Metoprolol Tartrate (Lopressor)  50 mg PO BID [facility]


   Last Admin: 03/18/18 17:13 Dose:  Not Given


Ondansetron HCl (Zofran Tab)  4 mg PO Q4 PRN


   PRN Reason: Nausea/Vomiting


   Last Admin: 03/16/18 08:59 Dose:  4 mg


Pantoprazole Sodium (Protonix Ec Tab)  40 mg PO DAILY [facility]


   Last Admin: 03/18/18 10:12 Dose:  40 mg


Polyethylene Glycol (Miralax)  17 gm PO TID [facility]


   Last Admin: 03/18/18 17:14 Dose:  Not Given











- Labs


Labs: 


 





 03/18/18 06:00 





 03/18/18 06:00 





 











PT  18.5 SECONDS (9.4-12.5)  H  03/19/18  05:30    


 


INR  1.59  (0.93-1.08)  H  03/19/18  05:30    


 


APTT  36.4 Seconds (25.1-36.5)   03/19/18  05:30    














- Constitutional


Appears: Chronically Ill





- Head Exam


Head Exam: NORMAL INSPECTION





- ENT Exam


ENT Exam: Mucous Membranes Moist





- Neck Exam


Neck Exam: absent: Meningismus





- Respiratory Exam


Respiratory Exam: Decreased Breath Sounds





- Cardiovascular Exam


Cardiovascular Exam: +S1, +S2





- GI/Abdominal Exam


GI & Abdominal Exam: Soft.  absent: Tenderness





- Extremities Exam


Additional comments: 





right foot with dressings in place





Assessment and Plan





- Assessment and Plan (Free Text)


Plan: 





Assessment


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


prostate cancer with bone metastases


atrial fibrillation on anticoagulation


dilated cardiomyopathy





Plan


blood cx have been negative; follow up plans of Surgery for revascularization 

or if not possible then amputation of foot; on foot xray, patient may also have 

osteomyelitis


continue IV Vancomycin


will continue to monitor clinically

## 2018-03-19 NOTE — CP.PCM.CON
History of Present Illness





- History of Present Illness


History of Present Illness: 





Palliative consult requested by Dr GISELA Márquez


Reason: Goals of care





77 year old male with history of advanced prostate cancer who presented with 

pulse-less cold right foot. He had been complaining of increasing discomfort in 

right foot for 4 days prior to presentation. CT angiogram showed severe 

calcification of aortoiciliac artery,femoral artery, profunda femoris artery,

superficial femoral artery and the tibial vessels.





PMHx: stage IV prostate cancer, A Fib on Eliquis, hyperbilirubenemia, SIADH, 

hypokalemia,pneumonia and rhabdomyolsis. 





Social History: Former smoker, no alcohol or drug use. , spouse has 

dementia. Lives with his son/NOAH Green.





Advance Care Planing: The patient has an Advanced Directive. He also presented 

with a POLST which has not been signed by patient or LIP and is therefore not 

valid.





Review of Systems: Other than pain and coldness in right lower extremity, 12 

point review negative.








Past Patient History





- Infectious Disease


Hx of Infectious Diseases: None





- Tetanus Immunizations


Tetanus Immunization: Unknown





- Past Social History


Smoking Status: Former Smoker





- CARDIAC


Hx Cardiac Disorders: Yes (Afib)


Hx Atrial Fibrillation: Yes


Hx Congestive Heart Failure: Yes





- PULMONARY


Hx Respiratory Disorders: No





- NEUROLOGICAL


Hx Neurological Disorder: No





- HEENT


Hx HEENT Problems: No





- RENAL


Hx Chronic Kidney Disease: No





- ENDOCRINE/METABOLIC


Hx Endocrine Disorders: No





- HEMATOLOGICAL/ONCOLOGICAL


Hx Cancer: Yes





- INTEGUMENTARY


Hx Dermatological Problems: No





- MUSCULOSKELETAL/RHEUMATOLOGICAL


Hx Falls: No





- GASTROINTESTINAL


Hx Gastrointestinal Disorders: No





- GENITOURINARY/GYNECOLOGICAL


Hx Genitourinary Disorders: No


Hx Prostate Cancer: Yes





- PSYCHIATRIC


Hx Depression: No


Hx Emotional Abuse: No


Hx Physical Abuse: No


Hx Substance Use: No





- SURGICAL HISTORY


Hx Surgeries: No





- ANESTHESIA


Hx Anesthesia: No





Meds


Allergies/Adverse Reactions: 


 Allergies











Allergy/AdvReac Type Severity Reaction Status Date / Time


 


No Known Allergies Allergy   Verified 01/28/14 15:34














- Medications


Medications: 


 Current Medications





Acetaminophen (Tylenol 325mg Tab)  650 mg PO Q6 PRN


   PRN Reason: TEMP>=99.5F


Acetaminophen (Tylenol 325mg Tab)  650 mg PO Q6 PRN


   PRN Reason: Headache


Acetaminophen (Tylenol 650 Mg Supp)  650 mg RC Q6H PRN


   PRN Reason: Headache


Acetaminophen (Tylenol 650 Mg Supp)  650 mg RC Q6H PRN


   PRN Reason: TEMP>=99.5F,unable to take PO


Aspirin (Ecotrin)  81 mg PO DAILY [facility]


   Last Admin: 03/19/18 09:17 Dose:  81 mg


Atorvastatin Calcium (Lipitor)  20 mg PO DIN [facility]


Bicalutamide (Casodex)  50 mg PO DAILY [facility]


   Last Admin: 03/18/18 11:45 Dose:  50 mg


Bisacodyl (Dulcolax)  10 mg RC Q48H [facility]


   Last Admin: 03/17/18 10:41 Dose:  10 mg


Digoxin (Digoxin)  0.125 mg PO DAILY [facility]


   Last Admin: 03/19/18 09:17 Dose:  0.125 mg


Diltiazem HCl (Cardizem Cd)  240 mg PO DAILY [facility]


   Last Admin: 03/19/18 09:21 Dose:  240 mg


Docusate Sodium (Colace)  100 mg PO TID [facility]


   Last Admin: 03/19/18 09:17 Dose:  100 mg


Furosemide (Lasix)  20 mg IVP Q12 [facility]


   Last Admin: 03/19/18 09:22 Dose:  20 mg


Heparin Sodium/Sodium Chloride (Heparin 93498 Units/250ml 1/2 Normal Saline)  25

,000 units in 250 mls @ 9.906 mls/hr IV .Q24H [facility]; 12 UNITS/KG/HR


   PRN Reason: Protocol


   Last Admin: 03/19/18 08:44 Dose:  11 units/kg/hr, 9.081 mls/hr


Vancomycin HCl (Vancomycin 1gm)  1 gm in 250 mls @ 167 mls/hr IVPB Q12H [facility]


   PRN Reason: Protocol


   Last Admin: 03/19/18 05:48 Dose:  167 mls/hr


Levalbuterol HCl (Xopenex)  0.63 mg IH [id number] [facility]


   Last Admin: 03/19/18 07:44 Dose:  0.63 mg


Levalbuterol HCl (Xopenex)  0.63 mg IH Q2H PRN


   PRN Reason: Shortness of Breath


Magnesium Oxide (Mag-Ox)  400 mg PO BID [facility]


   Last Admin: 03/19/18 09:22 Dose:  400 mg


Metoprolol Tartrate (Lopressor)  50 mg PO BID [facility]


   Last Admin: 03/19/18 09:22 Dose:  50 mg


Ondansetron HCl (Zofran Tab)  4 mg PO Q4 PRN


   PRN Reason: Nausea/Vomiting


   Last Admin: 03/16/18 08:59 Dose:  4 mg


Pantoprazole Sodium (Protonix Ec Tab)  40 mg PO DAILY [facility]


   Last Admin: 03/19/18 09:17 Dose:  40 mg


Polyethylene Glycol (Miralax)  17 gm PO TID [facility]


   Last Admin: 03/19/18 09:22 Dose:  17 gm











Physical Exam





- Constitutional


Appears: No Acute Distress, Chronically Ill





- Head Exam


Head Exam: NORMOCEPHALIC





- Eye Exam


Eye Exam: Normal appearance, PERRL





- ENT Exam


ENT Exam: Mucous Membranes Moist, Normal Oropharynx





- Neck Exam


Neck exam: Positive for: Normal Inspection





- Respiratory Exam


Respiratory Exam: Clear to Auscultation Bilateral, NORMAL BREATHING PATTERN





- Cardiovascular Exam


Cardiovascular Exam: Irregular Rhythm, +S1, +S2





- GI/Abdominal Exam


GI & Abdominal Exam: Normal Bowel Sounds, Soft





- Extremities Exam


Additional comments: 





right lower extremity dressing intact, right extremity cold, no palpable pulses





- Back Exam


Back exam: NORMAL INSPECTION





- Neurological Exam


Neurological exam: Alert, Oriented x3





- Skin


Skin Exam: Dry





- Additional Findings


Additional findings: 





palliative performance scale rating 40 %





Results





- Vital Signs


Recent Vital Signs: 


 Last Vital Signs











Temp  97.7 F   03/19/18 06:00


 


Pulse  93 H  03/19/18 06:00


 


Resp  19   03/19/18 06:00


 


BP  124/58 L  03/19/18 09:22


 


Pulse Ox  97   03/19/18 06:00














- Labs


Result Diagrams: 


 03/18/18 06:00





 03/18/18 06:00


Labs: 


 Laboratory Results - last 24 hr











  03/19/18





  05:30


 


PT  18.5 H


 


INR  1.59 H


 


APTT  36.4














Assessment & Plan





- Assessment and Plan (Free Text)


Assessment: 





77 year old male with history of advanced prostate cancer, A Fib who is 

admitted with pulse less, gangrenous right lower extremity. The patient is not 

a candidate for reconstructive surgery to leg due to the severity of his 

disease.





The patient complains of discomfort in right leg. Although alert and oriented, 

he relies on he son Alf for complex decision making. The  patient states he 

wants to go home.





Alf been updated by [facility] and is aware of father's medical condion and 

poor prognosis. Claude expressed interest in hospice care. He is scheduled to 

meet with me today to discuss goals of care and hospice.





Alf and I met, resuscitation status discussed. Alf aware of benefits 

and burdens of resuscitation. States his father wants to be DNR/DNI. POLST 

directive completed, a copy is placed on chart. Hospice services also explained 

in detail. Questions answered. Alf later met with [facility] 

hospice liaison. Family agreeable to hospice services. Consents signed by 

patient for home hospice care.





Time spent in gaols of care,advance care plannig and end of life discussion, 45 

minutes


Plan: 





Continue current therapeutic interventions as per ID, vascular and nephrology 

recommendations.





Consider Oxycodone 5mg IR  every 6 hours as needed  for pain 





Goals of care and advance care planning.





POLST: DNR/DNI.





Hospice evaluation for home services

## 2018-03-19 NOTE — CP.PCM.PN
Subjective





- Date & Time of Evaluation


Date of Evaluation: 03/19/18


Time of Evaluation: 09:00





- Subjective


Subjective: 





Medicine Note for Dr. Márquez





Patient seen and examined at bedside. No acute event overnight. Patient and son 

have decided on DNR/DNI for patient. Patient complains of pain in right leg. He 

states he wants to go home. The family has agreed to home hospice service.














Objective





- Vital Signs/Intake and Output


Vital Signs (last 24 hours): 


 











Temp Pulse Resp BP Pulse Ox


 


 97.8 F   71   20   117/68   97 


 


 03/19/18 11:47  03/19/18 11:47  03/19/18 11:47  03/19/18 11:47  03/19/18 06:00








Intake and Output: 


 











 03/19/18 03/19/18





 06:59 18:59


 


Intake Total 418 


 


Output Total 1300 


 


Balance -882 














- Medications


Medications: 


 Current Medications





Acetaminophen (Tylenol 325mg Tab)  650 mg PO Q6 PRN


   PRN Reason: TEMP>=99.5F


Acetaminophen (Tylenol 325mg Tab)  650 mg PO Q6 PRN


   PRN Reason: Headache


Acetaminophen (Tylenol 650 Mg Supp)  650 mg RC Q6H PRN


   PRN Reason: Headache


Acetaminophen (Tylenol 650 Mg Supp)  650 mg RC Q6H PRN


   PRN Reason: TEMP>=99.5F,unable to take PO


Aspirin (Ecotrin)  81 mg PO DAILY [facility]


   Last Admin: 03/19/18 09:17 Dose:  81 mg


Atorvastatin Calcium (Lipitor)  20 mg PO DIN [facility]


Bicalutamide (Casodex)  50 mg PO DAILY [facility]


   Last Admin: 03/19/18 11:52 Dose:  50 mg


Bisacodyl (Dulcolax)  10 mg RC Q48H [facility]


   Last Admin: 03/19/18 11:40 Dose:  10 mg


Digoxin (Digoxin)  0.125 mg PO DAILY [facility]


   Last Admin: 03/19/18 09:17 Dose:  0.125 mg


Diltiazem HCl (Cardizem Cd)  240 mg PO DAILY [facility]


   Last Admin: 03/19/18 09:21 Dose:  240 mg


Docusate Sodium (Colace)  100 mg PO TID [facility]


   Last Admin: 03/19/18 14:05 Dose:  Not Given


Furosemide (Lasix)  20 mg IVP Q12 [facility]


   Last Admin: 03/19/18 09:22 Dose:  20 mg


Heparin Sodium/Sodium Chloride (Heparin 58863 Units/250ml 1/2 Normal Saline)  25

,000 units in 250 mls @ 9.906 mls/hr IV .Q24H [facility]; 12 UNITS/KG/HR


   PRN Reason: Protocol


   Last Admin: 03/19/18 08:44 Dose:  11 units/kg/hr, 9.081 mls/hr


Vancomycin HCl (Vancomycin 1gm)  1 gm in 250 mls @ 167 mls/hr IVPB Q12H [facility]


   PRN Reason: Protocol


   Last Admin: 03/19/18 15:22 Dose:  167 mls/hr


Levalbuterol HCl (Xopenex)  0.63 mg IH [id number] [facility]


   Last Admin: 03/19/18 13:46 Dose:  0.63 mg


Levalbuterol HCl (Xopenex)  0.63 mg IH Q2H PRN


   PRN Reason: Shortness of Breath


Magnesium Oxide (Mag-Ox)  400 mg PO BID [facility]


   Last Admin: 03/19/18 09:22 Dose:  400 mg


Metoprolol Tartrate (Lopressor)  50 mg PO BID [facility]


   Last Admin: 03/19/18 09:22 Dose:  50 mg


Mupirocin (Bactroban Ointment)  0 gm TOP BID [facility]


Ondansetron HCl (Zofran Tab)  4 mg PO Q4 PRN


   PRN Reason: Nausea/Vomiting


   Last Admin: 03/16/18 08:59 Dose:  4 mg


Pantoprazole Sodium (Protonix Ec Tab)  40 mg PO DAILY [facility]


   Last Admin: 03/19/18 09:17 Dose:  40 mg


Polyethylene Glycol (Miralax)  17 gm PO TID [facility]


   Last Admin: 03/19/18 14:05 Dose:  Not Given











- Labs


Labs: 


 





 03/18/18 06:00 





 03/18/18 06:00 





 











PT  18.5 SECONDS (9.4-12.5)  H  03/19/18  05:30    


 


INR  1.59  (0.93-1.08)  H  03/19/18  05:30    


 


APTT  102.0 Seconds (25.1-36.5)  H*  03/19/18  13:20    














- Constitutional


Appears: No Acute Distress, Chronically Ill





- Head Exam


Head Exam: ATRAUMATIC, NORMOCEPHALIC





- Eye Exam


Eye Exam: Normal appearance





- ENT Exam


ENT Exam: Mucous Membranes Moist





- Respiratory Exam


Respiratory Exam: NORMAL BREATHING PATTERN





- Cardiovascular Exam


Cardiovascular Exam: REGULAR RHYTHM





- GI/Abdominal Exam


GI & Abdominal Exam: Soft.  absent: Tenderness





- Extremities Exam


Additional comments: 





RLE: no pulses appreciated, ulceration, ischemic skin changes, bandage clean 

dry and intact





- Neurological Exam


Neurological Exam: Alert, Awake





- Psychiatric Exam


Psychiatric exam: Normal Affect, Normal Mood





- Skin


Skin Exam: Dry





Assessment and Plan





- Assessment and Plan (Free Text)


Plan: 





77 M with PMH of advanced prostate cancer who is admitted for gangrenous right 

lower extremity. Patient is not a candidate for surgery.





Continuing current management for now


Goals of care and advance care planning was discussed with palliative care


DNR/DNI


Pain control


Hospice evaluation for home services


Patient will be discharged pending hospice evaluation


Discussed with Dr. Yulisa Briones PGY1

## 2018-03-19 NOTE — CP.PCM.PN
Subjective





- Date & Time of Evaluation


Date of Evaluation: 03/19/18


Time of Evaluation: 10:46





- Subjective


Subjective: 


Nephrology Consultation Note





Assessment: stable


PVD wth possible Rt foot osteomyelitis


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 

prostate CA


Hypertensive Chronic Kidney Disease (I12.9)


Chronic Kidney Disease (N18.2) Stage 2 with Cr 1.1 


metastatic prostate cancer, HTN (I12.9)


chronic systolic CHF with LVEF 20%


chronic indwelling kim catheter





Plan


Na stable for now on oral fluid fluid restriction 1000 mL per day and lasix 20 

IV bid


Hypertension control with meds as ordered. unable to tolerate losartan due to 

hyperkalemia episodes.


Monitor Input/Output, daily weights, basic metabolic panel and sodium level


supplement electrolytes as needed


Avoid nephrotoxins/NSAIDs


Glycemic control


Further work up for as per primary team. CHF management as per cardiology


PVD/osteo management as per podiatry/ID/primary team


palliative care being consulted





Thanks for allowing me to participate in care of your patient. Will follow 

patient with you. Please call if any Qs.


Dr Rickey Tapia


Office: [phone] Cell: [phone] Fax: [phone]





Reason for consultation; Hyponatremia


HPI patient is 77-year-old male with history of chronic systolic CHF EF 20% 

also metastatic prostate cancer , BPH, recently seen for hyponatremia due to 

CHF and likely SIADH discharged to nursing home here with the Rt foot PVD. 

Renal consult was requested for hyponatremia management. Patient at this time 

denies shortness of breath nausea vomiting any pain in his stomach but reports 

constipation.





Subjective/ROS: Noted events overnight. Patients feels sleepy today


Denies chest pain, palpitation, denies shortness of breath


All other negative Except as mentioned in HPI. had bone biopsy by IR on 2/13/18 

Showed metastatic adeno carcinoma of prostate


s/p Rt thoracocentesis 3/7/18 ~ 700 mL fluid aspirated





Physical Examination:      


General Appearance: Comfortable, in no acute respiratory distress, co-operative 

. 


Vitals reviewed and noted as below


Head; Atraumatic, normocephalic


ENT: no ulcers no thrush. Tongue is midline. Oropharynx: no rash or ulcers.


EYES: Pupils are equal, round and reactive to light accommodation. Eye muscles 

and extraocular movement intact. Sclera is anicteric.


Neck; supple no lymphadenopathy, no thyromegaly or bruit


Lungs: Normal respiratory rate/effort. Breath sounds  b/l present and clear 

anteriorly


Heart: Normal rate. s1s2 normal. No rub or gallop. 


Extremities: no edema. No varicose veins. Rt foot dressed


Neurological: Patient is oriented to person, place and time. No focal deficit. 

Strength bilateral appropriate and equal


Skin: Warm and dry. Normal turgor. No rash. Palpitation: Normal elasticity for 

age. Feets somewhat cool to touch


Abdomen: Abdomen is soft. Bowel sounds +. There is no abdominal tenderness, no 

guarding/rigidity no organomegaly. abdomen distended


Psych: normal insight and normal affect/mood


MSK: no joint tenderness or swelling. Digits and nails normal, no deformity


: kidney or bladder not palpable. Has Kim catheter in place





Labs/imaging reviewed.


Past medical history, past surgical history, family history, social history, 

allergy reviewed and noted as below


Family hx: no hx of CKD. Rest non-contributor








Objective





- Vital Signs/Intake and Output


Vital Signs (last 24 hours): 


 











Temp Pulse Resp BP Pulse Ox


 


 97.7 F   93 H  19   124/58 L  97 


 


 03/19/18 06:00  03/19/18 06:00  03/19/18 06:00  03/19/18 09:22  03/19/18 06:00








Intake and Output: 


 











 03/19/18 03/19/18





 06:59 18:59


 


Intake Total 418 


 


Output Total 1300 


 


Balance -882 














- Medications


Medications: 


 Current Medications





Acetaminophen (Tylenol 325mg Tab)  650 mg PO Q6 PRN


   PRN Reason: TEMP>=99.5F


Acetaminophen (Tylenol 325mg Tab)  650 mg PO Q6 PRN


   PRN Reason: Headache


Acetaminophen (Tylenol 650 Mg Supp)  650 mg RC Q6H PRN


   PRN Reason: Headache


Acetaminophen (Tylenol 650 Mg Supp)  650 mg RC Q6H PRN


   PRN Reason: TEMP>=99.5F,unable to take PO


Aspirin (Ecotrin)  81 mg PO DAILY [facility]


   Last Admin: 03/19/18 09:17 Dose:  81 mg


Atorvastatin Calcium (Lipitor)  20 mg PO DIN [facility]


Bicalutamide (Casodex)  50 mg PO DAILY [facility]


   Last Admin: 03/18/18 11:45 Dose:  50 mg


Bisacodyl (Dulcolax)  10 mg RC Q48H [facility]


   Last Admin: 03/17/18 10:41 Dose:  10 mg


Digoxin (Digoxin)  0.125 mg PO DAILY [facility]


   Last Admin: 03/19/18 09:17 Dose:  0.125 mg


Diltiazem HCl (Cardizem Cd)  240 mg PO DAILY [facility]


   Last Admin: 03/19/18 09:21 Dose:  240 mg


Docusate Sodium (Colace)  100 mg PO TID [facility]


   Last Admin: 03/19/18 09:17 Dose:  100 mg


Furosemide (Lasix)  20 mg IVP Q12 [facility]


   Last Admin: 03/19/18 09:22 Dose:  20 mg


Heparin Sodium/Sodium Chloride (Heparin 64278 Units/250ml 1/2 Normal Saline)  25

,000 units in 250 mls @ 9.906 mls/hr IV .Q24H [facility]; 12 UNITS/KG/HR


   PRN Reason: Protocol


   Last Admin: 03/19/18 08:44 Dose:  11 units/kg/hr, 9.081 mls/hr


Vancomycin HCl (Vancomycin 1gm)  1 gm in 250 mls @ 167 mls/hr IVPB Q12H [facility]


   PRN Reason: Protocol


   Last Admin: 03/19/18 05:48 Dose:  167 mls/hr


Levalbuterol HCl (Xopenex)  0.63 mg IH [id number] [facility]


   Last Admin: 03/19/18 07:44 Dose:  0.63 mg


Levalbuterol HCl (Xopenex)  0.63 mg IH Q2H PRN


   PRN Reason: Shortness of Breath


Magnesium Oxide (Mag-Ox)  400 mg PO BID [facility]


   Last Admin: 03/19/18 09:22 Dose:  400 mg


Metoprolol Tartrate (Lopressor)  50 mg PO BID [facility]


   Last Admin: 03/19/18 09:22 Dose:  50 mg


Ondansetron HCl (Zofran Tab)  4 mg PO Q4 PRN


   PRN Reason: Nausea/Vomiting


   Last Admin: 03/16/18 08:59 Dose:  4 mg


Pantoprazole Sodium (Protonix Ec Tab)  40 mg PO DAILY [facility]


   Last Admin: 03/19/18 09:17 Dose:  40 mg


Polyethylene Glycol (Miralax)  17 gm PO TID [facility]


   Last Admin: 03/19/18 09:22 Dose:  17 gm











- Labs


Labs: 


 





 03/18/18 06:00 





 03/18/18 06:00 





 











PT  18.5 SECONDS (9.4-12.5)  H  03/19/18  05:30    


 


INR  1.59  (0.93-1.08)  H  03/19/18  05:30    


 


APTT  36.4 Seconds (25.1-36.5)   03/19/18  05:30

## 2018-03-20 VITALS — HEART RATE: 79 BPM

## 2018-03-20 VITALS
HEART RATE: 67 BPM | SYSTOLIC BLOOD PRESSURE: 118 MMHG | DIASTOLIC BLOOD PRESSURE: 75 MMHG | TEMPERATURE: 97.9 F | RESPIRATION RATE: 18 BRPM

## 2018-03-20 VITALS — OXYGEN SATURATION: 98 %

## 2018-03-20 LAB
APTT BLD: 73 SECONDS (ref 25.1–36.5)
INR PPP: 1.51 (ref 0.93–1.08)
PROTHROMBIN TIME: 17.5 SECONDS (ref 9.4–12.5)

## 2018-03-20 RX ADMIN — POLYETHYLENE GLYCOL 3350 SCH GM: 17 POWDER, FOR SOLUTION ORAL at 09:31

## 2018-03-20 RX ADMIN — DILTIAZEM HYDROCHLORIDE SCH MG: 240 CAPSULE, COATED, EXTENDED RELEASE ORAL at 09:30

## 2018-03-20 RX ADMIN — Medication SCH MG: at 09:31

## 2018-03-20 RX ADMIN — PANTOPRAZOLE SODIUM SCH MG: 20 TABLET, DELAYED RELEASE ORAL at 09:31

## 2018-03-20 RX ADMIN — POLYETHYLENE GLYCOL 3350 SCH GM: 17 POWDER, FOR SOLUTION ORAL at 18:05

## 2018-03-20 RX ADMIN — POLYETHYLENE GLYCOL 3350 SCH GM: 17 POWDER, FOR SOLUTION ORAL at 14:07

## 2018-03-20 RX ADMIN — LEVALBUTEROL SCH: 0.63 SOLUTION RESPIRATORY (INHALATION) at 01:37

## 2018-03-20 RX ADMIN — HEPARIN SODIUM SCH MLS/HR: 10000 INJECTION, SOLUTION INTRAVENOUS at 04:59

## 2018-03-20 RX ADMIN — DIGOXIN SCH MG: 0.12 TABLET ORAL at 09:30

## 2018-03-20 RX ADMIN — Medication SCH MG: at 18:06

## 2018-03-20 RX ADMIN — VANCOMYCIN HYDROCHLORIDE SCH MLS/HR: 1 INJECTION, POWDER, LYOPHILIZED, FOR SOLUTION INTRAVENOUS at 03:28

## 2018-03-20 RX ADMIN — LEVALBUTEROL SCH MG: 0.63 SOLUTION RESPIRATORY (INHALATION) at 13:56

## 2018-03-20 RX ADMIN — LEVALBUTEROL SCH MG: 0.63 SOLUTION RESPIRATORY (INHALATION) at 07:51

## 2018-03-20 NOTE — CP.PCM.PN
Subjective





- Date & Time of Evaluation


Date of Evaluation: 03/20/18


Time of Evaluation: 10:20





- Subjective


Subjective: 


Pain on the right leg is slightly improved, no fevers, not in distress, 

afebrile.





Objective





- Vital Signs/Intake and Output


Vital Signs (last 24 hours): 


 











Temp Pulse Resp BP Pulse Ox


 


 97.5 F L  79   20   119/68   98 


 


 03/20/18 06:00  03/20/18 06:00  03/20/18 06:00  03/20/18 06:00  03/20/18 06:00








Intake and Output: 


 











 03/20/18 03/20/18





 06:59 18:59


 


Intake Total 476 


 


Output Total 1500 


 


Balance -1024 














- Medications


Medications: 


 Current Medications





Acetaminophen (Tylenol 325mg Tab)  650 mg PO Q6 PRN


   PRN Reason: TEMP>=99.5F


Acetaminophen (Tylenol 325mg Tab)  650 mg PO Q6 PRN


   PRN Reason: Headache


Acetaminophen (Tylenol 650 Mg Supp)  650 mg RC Q6H PRN


   PRN Reason: Headache


Acetaminophen (Tylenol 650 Mg Supp)  650 mg RC Q6H PRN


   PRN Reason: TEMP>=99.5F,unable to take PO


Aspirin (Ecotrin)  81 mg PO DAILY [facility]


   Last Admin: 03/19/18 09:17 Dose:  81 mg


Atorvastatin Calcium (Lipitor)  20 mg PO DIN [facility]


   Last Admin: 03/19/18 17:12 Dose:  20 mg


Bicalutamide (Casodex)  50 mg PO DAILY [facility]


   Last Admin: 03/19/18 11:52 Dose:  50 mg


Bisacodyl (Dulcolax)  10 mg RC Q48H [facility]


   Last Admin: 03/19/18 11:40 Dose:  10 mg


Digoxin (Digoxin)  0.125 mg PO DAILY [facility]


   Last Admin: 03/19/18 09:17 Dose:  0.125 mg


Diltiazem HCl (Cardizem Cd)  240 mg PO DAILY [facility]


   Last Admin: 03/19/18 09:21 Dose:  240 mg


Docusate Sodium (Colace)  100 mg PO TID [facility]


   Last Admin: 03/19/18 17:16 Dose:  Not Given


Furosemide (Lasix)  20 mg IVP Q12 [facility]


   Last Admin: 03/19/18 21:00 Dose:  20 mg


Heparin Sodium/Sodium Chloride (Heparin 00155 Units/250ml 1/2 Normal Saline)  25

,000 units in 250 mls @ 9.906 mls/hr IV .Q24H [facility]; 12 UNITS/KG/HR


   PRN Reason: Protocol


   Last Admin: 03/20/18 04:59 Dose:  10 units/kg/hr, 8.255 mls/hr


Vancomycin HCl (Vancomycin 1gm)  1 gm in 250 mls @ 167 mls/hr IVPB Q12H [facility]


   PRN Reason: Protocol


   Last Admin: 03/20/18 03:28 Dose:  167 mls/hr


Levalbuterol HCl (Xopenex)  0.63 mg IH [id number] [facility]


   Last Admin: 03/20/18 07:51 Dose:  0.63 mg


Levalbuterol HCl (Xopenex)  0.63 mg IH Q2H [facility]N


   PRN Reason: Shortness of Breath


Magnesium Oxide (Mag-Ox)  400 mg PO BID [facility]


   Last Admin: 03/19/18 17:12 Dose:  400 mg


Metoprolol Tartrate (Lopressor)  50 mg PO BID [facility]


   Last Admin: 03/19/18 17:12 Dose:  50 mg


Mupirocin (Bactroban Ointment)  0 gm TOP BID [facility]


   Last Admin: 03/19/18 17:12 Dose:  1 applic


Ondansetron HCl (Zofran Tab)  4 mg PO Q4 PRN


   PRN Reason: Nausea/Vomiting


   Last Admin: 03/16/18 08:59 Dose:  4 mg


Oxycodone HCl (Oxycodone Immediate Release Tab)  5 mg PO Q6H PRN


   PRN Reason: Pain, moderate (4-7)


Pantoprazole Sodium (Protonix Ec Tab)  40 mg PO DAILY [facility]


   Last Admin: 03/19/18 09:17 Dose:  40 mg


Polyethylene Glycol (Miralax)  17 gm PO TID [facility]


   Last Admin: 03/19/18 17:16 Dose:  Not Given











- Labs


Labs: 


 





 03/18/18 06:00 





 03/18/18 06:00 





 











PT  17.5 SECONDS (9.4-12.5)  H  03/20/18  05:10    


 


INR  1.51  (0.93-1.08)  H  03/20/18  05:10    


 


APTT  86.2 Seconds (25.1-36.5)  H  03/20/18  07:55    














- Constitutional


Appears: Non-toxic, Chronically Ill





- Head Exam


Head Exam: NORMAL INSPECTION





- ENT Exam


ENT Exam: Mucous Membranes Moist





- Neck Exam


Neck Exam: absent: Meningismus





- Respiratory Exam


Respiratory Exam: Decreased Breath Sounds





- Cardiovascular Exam


Cardiovascular Exam: +S1, +S2





- GI/Abdominal Exam


GI & Abdominal Exam: Soft.  absent: Tenderness





- Extremities Exam


Additional comments: 





right foot with dressings in place





Assessment and Plan





- Assessment and Plan (Free Text)


Plan: 





Assessment


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


prostate cancer with bone metastases


atrial fibrillation on anticoagulation


dilated cardiomyopathy





Plan


blood cx have been negative; follow up plans of Surgery for revascularization 

or if not possible then amputation of foot; on foot xray, patient may also have 

osteomyelitis


continue IV Vancomycin (day 5)


will continue to monitor clinically

## 2018-03-20 NOTE — CP.PCM.PN
Subjective





- Date & Time of Evaluation


Date of Evaluation: 03/20/18


Time of Evaluation: 10:00





- Subjective


Subjective: 





Alert,oriented. Offers no complaints





Objective





- Vital Signs/Intake and Output


Vital Signs (last 24 hours): 


 











Temp Pulse Resp BP Pulse Ox


 


 97.5 F L  79   20   121/67   98 


 


 03/20/18 06:00  03/20/18 09:30  03/20/18 06:00  03/20/18 09:30  03/20/18 06:00








Intake and Output: 


 











 03/20/18 03/20/18





 06:59 18:59


 


Intake Total 476 


 


Output Total 1500 


 


Balance -1024 














- Medications


Medications: 


 Current Medications





Acetaminophen (Tylenol 325mg Tab)  650 mg PO Q6 PRN


   PRN Reason: TEMP>=99.5F


Acetaminophen (Tylenol 325mg Tab)  650 mg PO Q6 PRN


   PRN Reason: Headache


Acetaminophen (Tylenol 650 Mg Supp)  650 mg RC Q6H PRN


   PRN Reason: Headache


Acetaminophen (Tylenol 650 Mg Supp)  650 mg RC Q6H PRN


   PRN Reason: TEMP>=99.5F,unable to take PO


Aspirin (Ecotrin)  81 mg PO DAILY [facility]


   Last Admin: 03/20/18 09:30 Dose:  81 mg


Atorvastatin Calcium (Lipitor)  20 mg PO DIN [facility]


   Last Admin: 03/19/18 17:12 Dose:  20 mg


Bicalutamide (Casodex)  50 mg PO DAILY [facility]


   Last Admin: 03/20/18 09:35 Dose:  50 mg


Bisacodyl (Dulcolax)  10 mg RC Q48H [facility]


   Last Admin: 03/19/18 11:40 Dose:  10 mg


Digoxin (Digoxin)  0.125 mg PO DAILY [facility]


   Last Admin: 03/20/18 09:30 Dose:  0.125 mg


Diltiazem HCl (Cardizem Cd)  240 mg PO DAILY [facility]


   Last Admin: 03/20/18 09:30 Dose:  240 mg


Docusate Sodium (Colace)  100 mg PO TID [facility]


   Last Admin: 03/20/18 09:30 Dose:  100 mg


Furosemide (Lasix)  40 mg PO BID [facility]


Heparin Sodium/Sodium Chloride (Heparin 94717 Units/250ml 1/2 Normal Saline)  25

,000 units in 250 mls @ 9.906 mls/hr IV .Q24H [facility]; 12 UNITS/KG/HR


   PRN Reason: Protocol


   Last Admin: 03/20/18 04:59 Dose:  8 units/kg/hr, 6.604 mls/hr


Vancomycin HCl (Vancomycin 1gm)  1 gm in 250 mls @ 167 mls/hr IVPB Q12H [facility]


   PRN Reason: Protocol


   Last Admin: 03/20/18 03:28 Dose:  167 mls/hr


Levalbuterol HCl (Xopenex)  0.63 mg IH [id number] [facility]


   Last Admin: 03/20/18 07:51 Dose:  0.63 mg


Levalbuterol HCl (Xopenex)  0.63 mg IH Q2H PRN


   PRN Reason: Shortness of Breath


Magnesium Oxide (Mag-Ox)  400 mg PO BID [facility]


   Last Admin: 03/20/18 09:31 Dose:  400 mg


Metoprolol Tartrate (Lopressor)  50 mg PO BID [facility]


   Last Admin: 03/20/18 09:30 Dose:  50 mg


Mupirocin (Bactroban Ointment)  0 gm TOP BID [facility]


   Last Admin: 03/20/18 12:21 Dose:  1 applic


Ondansetron HCl (Zofran Tab)  4 mg PO Q4 PRN


   PRN Reason: Nausea/Vomiting


   Last Admin: 03/16/18 08:59 Dose:  4 mg


Oxycodone HCl (Oxycodone Immediate Release Tab)  5 mg PO Q6H PRN


   PRN Reason: Pain, moderate (4-7)


Pantoprazole Sodium (Protonix Ec Tab)  40 mg PO DAILY [facility]


   Last Admin: 03/20/18 09:31 Dose:  40 mg


Polyethylene Glycol (Miralax)  17 gm PO TID [facility]


   Last Admin: 03/20/18 09:31 Dose:  17 gm











- Labs


Labs: 


 





 03/18/18 06:00 





 03/18/18 06:00 





 











PT  17.5 SECONDS (9.4-12.5)  H  03/20/18  05:10    


 


INR  1.51  (0.93-1.08)  H  03/20/18  05:10    


 


APTT  86.2 Seconds (25.1-36.5)  H  03/20/18  07:55    














- Constitutional


Appears: No Acute Distress, Chronically Ill





- Eye Exam


Eye Exam: Normal appearance, PERRL





- ENT Exam


ENT Exam: Mucous Membranes Moist





- Neck Exam


Neck Exam: Normal Inspection





- Respiratory Exam


Respiratory Exam: Decreased Breath Sounds, NORMAL BREATHING PATTERN





- Cardiovascular Exam


Cardiovascular Exam: Irregular Rhythm, +S1, +S2





- GI/Abdominal Exam


GI & Abdominal Exam: Distended, Soft, Normal Bowel Sounds





- Extremities Exam


Additional comments: 





RLE dressing intact, extremity cold/pulse less





- Neurological Exam


Neurological Exam: Alert, Oriented x3





- Skin


Skin Exam: Dry, Warm





Assessment and Plan





- Assessment and Plan (Free Text)


Assessment: 





77 year old male with history of A Fib, advanced prostate cancer who is 

admitted with gangrenous right foot.





Family discussion regarding goals of care has taken place. Both patient and son 

agree to transition to hospice care. End of life counseling provided. Patient 

to be discharged home today under [facility]. End of 

life counseling provided


Plan: 





End of life counseling

## 2018-03-20 NOTE — CP.PCM.PN
<Lori Small - Last Filed: 03/20/18 11:16>





Subjective





- Date & Time of Evaluation


Date of Evaluation: 03/20/18


Time of Evaluation: 11:16





- Subjective


Subjective: 


Podiatry Progress Note - Dr. Bernabe





78 y/o male seen and examined at bedside this morning for RLE superficial 

wounds with ischemic changes. Pt states his pain is the same in the right leg, 

coming and going. States he is ready to go home today. Dressings remain intact 

to RLE. Denies any F/C/N/V/CP/SOB. Denies any events overnight. 

















Objective





- Vital Signs/Intake and Output


Vital Signs (last 24 hours): 


 











Temp Pulse Resp BP Pulse Ox


 


 97.5 F L  79   20   121/67   98 


 


 03/20/18 06:00  03/20/18 09:30  03/20/18 06:00  03/20/18 09:30  03/20/18 06:00








Intake and Output: 


 











 03/20/18 03/20/18





 06:59 18:59


 


Intake Total 476 


 


Output Total 1500 


 


Balance -1024 














- Medications


Medications: 


 Current Medications





Acetaminophen (Tylenol 325mg Tab)  650 mg PO Q6 PRN


   PRN Reason: TEMP>=99.5F


Acetaminophen (Tylenol 325mg Tab)  650 mg PO Q6 PRN


   PRN Reason: Headache


Acetaminophen (Tylenol 650 Mg Supp)  650 mg RC Q6H PRN


   PRN Reason: Headache


Acetaminophen (Tylenol 650 Mg Supp)  650 mg RC Q6H PRN


   PRN Reason: TEMP>=99.5F,unable to take PO


Aspirin (Ecotrin)  81 mg PO DAILY [facility]


   Last Admin: 03/20/18 09:30 Dose:  81 mg


Atorvastatin Calcium (Lipitor)  20 mg PO DIN [facility]


   Last Admin: 03/19/18 17:12 Dose:  20 mg


Bicalutamide (Casodex)  50 mg PO DAILY [facility]


   Last Admin: 03/20/18 09:35 Dose:  50 mg


Bisacodyl (Dulcolax)  10 mg RC Q48H [facility]


   Last Admin: 03/19/18 11:40 Dose:  10 mg


Digoxin (Digoxin)  0.125 mg PO DAILY [facility]


   Last Admin: 03/20/18 09:30 Dose:  0.125 mg


Diltiazem HCl (Cardizem Cd)  240 mg PO DAILY [facility]


   Last Admin: 03/20/18 09:30 Dose:  240 mg


Docusate Sodium (Colace)  100 mg PO TID [facility]


   Last Admin: 03/20/18 09:30 Dose:  100 mg


Furosemide (Lasix)  20 mg IVP Q12 [facility]


   Last Admin: 03/20/18 09:30 Dose:  20 mg


Heparin Sodium/Sodium Chloride (Heparin 52262 Units/250ml 1/2 Normal Saline)  25

,000 units in 250 mls @ 9.906 mls/hr IV .Q24H [facility]; 12 UNITS/KG/HR


   PRN Reason: Protocol


   Last Admin: 03/20/18 04:59 Dose:  8 units/kg/hr, 6.604 mls/hr


Vancomycin HCl (Vancomycin 1gm)  1 gm in 250 mls @ 167 mls/hr IVPB Q12H [facility]


   PRN Reason: Protocol


   Last Admin: 03/20/18 03:28 Dose:  167 mls/hr


Levalbuterol HCl (Xopenex)  0.63 mg IH [id number] [facility]


   Last Admin: 03/20/18 07:51 Dose:  0.63 mg


Levalbuterol HCl (Xopenex)  0.63 mg IH Q2H PRN


   PRN Reason: Shortness of Breath


Magnesium Oxide (Mag-Ox)  400 mg PO BID [facility]


   Last Admin: 03/20/18 09:31 Dose:  400 mg


Metoprolol Tartrate (Lopressor)  50 mg PO BID [facility]


   Last Admin: 03/20/18 09:30 Dose:  50 mg


Mupirocin (Bactroban Ointment)  0 gm TOP BID [facility]


   Last Admin: 03/19/18 17:12 Dose:  1 applic


Ondansetron HCl (Zofran Tab)  4 mg PO Q4 PRN


   PRN Reason: Nausea/Vomiting


   Last Admin: 03/16/18 08:59 Dose:  4 mg


Oxycodone HCl (Oxycodone Immediate Release Tab)  5 mg PO Q6H PRN


   PRN Reason: Pain, moderate (4-7)


Pantoprazole Sodium (Protonix Ec Tab)  40 mg PO DAILY [facility]


   Last Admin: 03/20/18 09:31 Dose:  40 mg


Polyethylene Glycol (Miralax)  17 gm PO TID [facility]


   Last Admin: 03/20/18 09:31 Dose:  17 gm











- Labs


Labs: 


 





 03/18/18 06:00 





 03/18/18 06:00 





 











PT  17.5 SECONDS (9.4-12.5)  H  03/20/18  05:10    


 


INR  1.51  (0.93-1.08)  H  03/20/18  05:10    


 


APTT  86.2 Seconds (25.1-36.5)  H  03/20/18  07:55    














- Constitutional


Appears: Well, Non-toxic, No Acute Distress





- Extremities Exam


Additional comments: 


Right lower extremity focused examination:


VASC: DP/PT pulses non-palpable. Ischemic skin changes noted with skin 

sloughing and shiny skin diffusely. Skin temperature cool to touch surrounding 

right foot. CFT absent to all digits. 


Derm: Superficial diffuse ulcerations noted to dorsum of 4th digit and dorsum 

of midfoot, with shiny red granular wound bases with peripheral skin sloughing. 

No active drainage, no purulence, no malodor, no fluctuance. 


Neuro: Protective sensation grossly intact


Ortho: Mild-moderate tenderness to palpation to entirety of right lower 

extremity at level of ankle and foot

















- Neurological Exam


Neurological Exam: Alert, Awake, Oriented x3





- Psychiatric Exam


Psychiatric exam: Normal Affect, Normal Mood





Assessment and Plan





- Assessment and Plan (Free Text)


Assessment: 


78 y/o male with superficial ischemic ulcerations to RLE with severe PAD





Plan: 


Pt seen and examined at bedside


Discussed with attending Dr. Bernabe


Cleansed ulcerations with saline


Optifoam applied to dorsal foot wounds; adaptic, ABD and DSD applied to 

anterior distal right leg wound


Wound culture of R foot final no growth


Continue IV Vancomycin


Pt to continue with local wound care - no surgical intervention planned at this 

time


Pt stable for discharge from podiatry standpoint


Podiatry will continue to follow while in house








<Law Bernabe - Last Filed: 03/20/18 11:51>





Objective





- Vital Signs/Intake and Output


Vital Signs (last 24 hours): 


 











Temp Pulse Resp BP Pulse Ox


 


 97.5 F L  79   20   121/67   98 


 


 03/20/18 06:00  03/20/18 09:30  03/20/18 06:00  03/20/18 09:30  03/20/18 06:00








Intake and Output: 


 











 03/20/18 03/20/18





 06:59 18:59


 


Intake Total 476 


 


Output Total 1500 


 


Balance -1024 














- Medications


Medications: 


 Current Medications





Acetaminophen (Tylenol 325mg Tab)  650 mg PO Q6 PRN


   PRN Reason: TEMP>=99.5F


Acetaminophen (Tylenol 325mg Tab)  650 mg PO Q6 PRN


   PRN Reason: Headache


Acetaminophen (Tylenol 650 Mg Supp)  650 mg RC Q6H PRN


   PRN Reason: Headache


Acetaminophen (Tylenol 650 Mg Supp)  650 mg RC Q6H PRN


   PRN Reason: TEMP>=99.5F,unable to take PO


Aspirin (Ecotrin)  81 mg PO DAILY [facility]


   Last Admin: 03/20/18 09:30 Dose:  81 mg


Atorvastatin Calcium (Lipitor)  20 mg PO DIN [facility]


   Last Admin: 03/19/18 17:12 Dose:  20 mg


Bicalutamide (Casodex)  50 mg PO DAILY [facility]


   Last Admin: 03/20/18 09:35 Dose:  50 mg


Bisacodyl (Dulcolax)  10 mg RC Q48H [facility]


   Last Admin: 03/19/18 11:40 Dose:  10 mg


Digoxin (Digoxin)  0.125 mg PO DAILY [facility]


   Last Admin: 03/20/18 09:30 Dose:  0.125 mg


Diltiazem HCl (Cardizem Cd)  240 mg PO DAILY [facility]


   Last Admin: 03/20/18 09:30 Dose:  240 mg


Docusate Sodium (Colace)  100 mg PO TID [facility]


   Last Admin: 03/20/18 09:30 Dose:  100 mg


Furosemide (Lasix)  20 mg IVP Q12 [facility]


   Last Admin: 03/20/18 09:30 Dose:  20 mg


Heparin Sodium/Sodium Chloride (Heparin 23455 Units/250ml 1/2 Normal Saline)  25

,000 units in 250 mls @ 9.906 mls/hr IV .Q24H [facility]; 12 UNITS/KG/HR


   PRN Reason: Protocol


   Last Admin: 03/20/18 04:59 Dose:  8 units/kg/hr, 6.604 mls/hr


Vancomycin HCl (Vancomycin 1gm)  1 gm in 250 mls @ 167 mls/hr IVPB Q12H [facility]


   PRN Reason: Protocol


   Last Admin: 03/20/18 03:28 Dose:  167 mls/hr


Levalbuterol HCl (Xopenex)  0.63 mg IH [id number] [facility]


   Last Admin: 03/20/18 07:51 Dose:  0.63 mg


Levalbuterol HCl (Xopenex)  0.63 mg IH Q2H PRN


   PRN Reason: Shortness of Breath


Magnesium Oxide (Mag-Ox)  400 mg PO BID [facility]


   Last Admin: 03/20/18 09:31 Dose:  400 mg


Metoprolol Tartrate (Lopressor)  50 mg PO BID [facility]


   Last Admin: 03/20/18 09:30 Dose:  50 mg


Mupirocin (Bactroban Ointment)  0 gm TOP BID [facility]


   Last Admin: 03/19/18 17:12 Dose:  1 applic


Ondansetron HCl (Zofran Tab)  4 mg PO Q4 PRN


   PRN Reason: Nausea/Vomiting


   Last Admin: 03/16/18 08:59 Dose:  4 mg


Oxycodone HCl (Oxycodone Immediate Release Tab)  5 mg PO Q6H PRN


   PRN Reason: Pain, moderate (4-7)


Pantoprazole Sodium (Protonix Ec Tab)  40 mg PO DAILY [facility]


   Last Admin: 03/20/18 09:31 Dose:  40 mg


Polyethylene Glycol (Miralax)  17 gm PO TID [facility]


   Last Admin: 03/20/18 09:31 Dose:  17 gm











- Labs


Labs: 


 





 03/18/18 06:00 





 03/18/18 06:00 





 











PT  17.5 SECONDS (9.4-12.5)  H  03/20/18  05:10    


 


INR  1.51  (0.93-1.08)  H  03/20/18  05:10    


 


APTT  86.2 Seconds (25.1-36.5)  H  03/20/18  07:55    














Attending/Attestation





- Attestation


I have personally seen and examined this patient.: Yes


I have fully participated in the care of the patient.: Yes


I have reviewed all pertinent clinical information, including history, physical 

exam and plan: Yes

## 2018-03-20 NOTE — PN
DATE:  03/19/2018



SUBJECTIVE:  The patient is seen in room 260, bed 1.  The patient is seen

sitting up in the bed.  Overnight nurse's notes were reviewed.  The patient

was found to be alert, awake, oriented x2.  The patient is awake,

responsive, alert, oriented to person, is able to say his name correctly,

is able to recall a son's name without any difficulty, able to state my

name without any difficulty.



PHYSICAL EXAMINATION:

VITAL SIGNS:  T-max 97.7.  Telemetry shows atrial fibrillation, heart rate

82, 93; blood pressure 124/58, 137/75; respiration 18; O2 sat 97%. 

Telemetry shows atrial fibrillation as mentioned.

HEENT:  Head examination normocephalic, atraumatic.  HEENT examination

shows pinkish conjunctivae.  Anicteric sclerae.  No oropharyngeal lesion. 

No neck rigidity.

CHEST:  Kyphosis.

LUNGS:  Examination shows decreased breath sound at the bases, left more

than the right.

CARDIOVASCULAR:  S1, S2, irregular rhythm.  Positive systolic murmur, left

sternal border, left second intercostal space, right second intercostal

space.

ABDOMEN:  Less protuberant.  No hepatosplenomegaly palpable.

GENITALIA:  Male.  Positive Fraire catheter.

EXTREMITY:  Shows positive right foot dressing.  Positive discoloration of

the left foot and toe.

MUSCULOSKELETAL:  Examination shows a body mass index of 26.

NEUROLOGIC:  The patient is alert, awake, responsive.  Oriented to person,

oriented to place, oriented to his name and his son's name and my name. 

The patient is able to follow simple command.



DIAGNOSTICS:  On 03/18/2018 were reviewed.  On 03/19/2019, the patient had

a PT/PTT, which is 18.5 and 36.4.  Microbiology:  Cultures are all

negative.  The patient's foot x-ray was the last x-ray noted.



The patient was seen by Infectious Disease, Nephrology, Podiatry, Vascular

Surgery.  Their impression was noted.



IMPRESSION AND PLAN:

1.  Severe bilateral lower extremity peripheral vascular disease with

bilateral superficial femoral artery occlusion.

2.  Right foot metatarsal osteomyelitis.

3.  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

metastasis.

4.  Hyponatremia, probably secondary to congestive heart failure, syndrome

of inappropriate antidiuretic hormone secondary to his stage IV metastatic

adenocarcinoma of the prostate.

5.  Chronic kidney disease stage 2.

6.  Dilated nonischemic cardiomyopathy.

7.  Questionable urinary voiding dysfunction versus urinary retention with

indwelling Fraire catheter.

8.  Gait dysfunction.

9.  Deconditioning.

10.  Right foot gangrene.

11.  Eliquis-requiring atrial fibrillation.

12.  Normocytic anemia.

13.  Recurrent, refractory hyponatremia.

14.  Prerenal kidney injury.

15.  Mild hyperbilirubinemia and transaminitis.

16.  Severe gait dysfunction and deconditioning, bilateral lower extremity.

17.  Constipation.

18.  Questionable ileus versus partial small bowel obstruction.

19.  Systolic congestive heart failure.

20.  Dyslipidemia.

21.  Hypomagnesemia.



Plan at this time, the patient's condition has been extensively discussed

again with the patient's son, Claude on phone number [phone].  I

have explained again to the patient's son about the patient's extremely

poor prognosis.  The patient's son has already been contacted by the

Vascular Surgery.  They have already explained to the patient's son about

the patient's overall poor prognosis.  I have discussed with the patient's

son about living will advanced directive DNR/DNI and palliative supportive

care including hospice care was discussed.  The patient's son was advised

to contact palliative care nurse, Nini Foutnain to initiate the above

mentioned.  The patient's son is very receptive to the living will advanced

directive DNR/DNI and palliative care including hospice care.  The

patient's son has stated very clearly that he wants to take his father home

and make him comfortable.



The patient's current medications are;

1.  Cardizem  mg daily.

2.  Casodex 50 mg daily.

3.  Colace 100 mg three times a day.

4.  Digoxin 0.125 daily.

5.  Dulcolax suppository 10 mg every 48 hours.

6.  Ecotrin 81 mg daily.

7.  Heparin drip.

8.  Lasix 20 mg IV q. 12.

9.  Lipitor 20 mg daily.

10.  Lopressor 50 mg twice a day.

11.  Magnesium oxide 400 twice a day.

12.  MiraLax 17 g three times a day.

13.  Protonix 40 mg daily.

14.  Tylenol 650 mg suppository and p.o. q. 6 p.r.n.

15.  Vancomycin 1 g IV q. 12.

16.  Xopenex nebulizer q. 6 hours round-the-clock, q. 2 hours p.r.n.

17.  Zofran 4 mg p.o. q. 4 p.r.n.



The patient has been ordered out of bed.



Overall prognosis is extremely poor, awaiting the patient and the patient's

family decision about living will advanced directive DNR/DNI and palliative

care/hospice care.



Dictated and electronically signed, not read.





__________________________________________

Dale Márquez MD





DD:  03/19/2018 11:44:08

DT:  03/19/2018 11:49:41

Job # [id number]

## 2018-03-20 NOTE — CP.PCM.PN
Subjective





- Date & Time of Evaluation


Date of Evaluation: 03/20/18


Time of Evaluation: 12:15





- Subjective


Subjective: 





Nephrology Consultation Note





Assessment: stable


PVD wth possible Rt foot osteomyelitis


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 

prostate CA


Hypertensive Chronic Kidney Disease (I12.9)


Chronic Kidney Disease (N18.2) Stage 2 with Cr 1.1 


metastatic prostate cancer, HTN (I12.9)


chronic systolic CHF with LVEF 20%


chronic indwelling kim catheter





Plan


Na stable for now on oral fluid fluid restriction 1000 mL per day and lasix 20 

IV bid, change to 40 po bid


Hypertension control with meds as ordered. unable to tolerate losartan due to 

hyperkalemia episodes.


supplement electrolytes as needed


Avoid nephrotoxins/NSAIDs


Glycemic control


Further work up for as per primary team. CHF management as per cardiology


PVD/osteo management as per podiatry/ID/primary team


palliative care input noted. pt being d/c to home with hospice. stable from 

renal perspective





Thanks for allowing me to participate in care of your patient. Will sign off. 

Please call if any Qs.


Dr Rickey Tapia


Office: [phone] Cell: [phone] Fax: [phone]





Reason for consultation; Hyponatremia


HPI patient is 77-year-old male with history of chronic systolic CHF EF 20% 

also metastatic prostate cancer , BPH, recently seen for hyponatremia due to 

CHF and likely SIADH discharged to nursing home here with the Rt foot PVD. 

Renal consult was requested for hyponatremia management. Patient at this time 

denies shortness of breath nausea vomiting any pain in his stomach but reports 

constipation.





Subjective/ROS: Noted events overnight. Patients feels sleepy today


Denies chest pain, palpitation, denies shortness of breath


All other negative Except as mentioned in HPI. had bone biopsy by IR on 2/13/18 

Showed metastatic adeno carcinoma of prostate


s/p Rt thoracocentesis 3/7/18 ~ 700 mL fluid aspirated





Physical Examination:      


General Appearance: Comfortable, in no acute respiratory distress, co-operative 

. 


Vitals reviewed and noted as below


Head; Atraumatic, normocephalic


ENT: no ulcers no thrush. Tongue is midline. Oropharynx: no rash or ulcers.


EYES: Pupils are equal, round and reactive to light accommodation. Eye muscles 

and extraocular movement intact. Sclera is anicteric.


Neck; supple no lymphadenopathy, no thyromegaly or bruit


Lungs: Normal respiratory rate/effort. Breath sounds  b/l present and clear 

anteriorly


Heart: Normal rate. s1s2 normal. No rub or gallop. 


Extremities: no edema. No varicose veins. Rt foot dressed


Neurological: Patient is oriented to person, place and time. No focal deficit. 

Strength bilateral appropriate and equal


Skin: Warm and dry. Normal turgor. No rash. Palpitation: Normal elasticity for 

age. Feets somewhat cool to touch


Abdomen: Abdomen is soft. Bowel sounds +. There is no abdominal tenderness, no 

guarding/rigidity no organomegaly. abdomen distended


Psych: normal insight and normal affect/mood


MSK: no joint tenderness or swelling. Digits and nails normal, no deformity


: kidney or bladder not palpable. Has Kim catheter in place





Labs/imaging reviewed.


Past medical history, past surgical history, family history, social history, 

allergy reviewed and noted as below


Family hx: no hx of CKD. Rest non-contributor





Objective





- Vital Signs/Intake and Output


Vital Signs (last 24 hours): 


 











Temp Pulse Resp BP Pulse Ox


 


 97.5 F L  79   20   121/67   98 


 


 03/20/18 06:00  03/20/18 09:30  03/20/18 06:00  03/20/18 09:30  03/20/18 06:00








Intake and Output: 


 











 03/20/18 03/20/18





 06:59 18:59


 


Intake Total 476 


 


Output Total 1500 


 


Balance -1024 














- Medications


Medications: 


 Current Medications





Acetaminophen (Tylenol 325mg Tab)  650 mg PO Q6 PRN


   PRN Reason: TEMP>=99.5F


Acetaminophen (Tylenol 325mg Tab)  650 mg PO Q6 PRN


   PRN Reason: Headache


Acetaminophen (Tylenol 650 Mg Supp)  650 mg RC Q6H PRN


   PRN Reason: Headache


Acetaminophen (Tylenol 650 Mg Supp)  650 mg RC Q6H PRN


   PRN Reason: TEMP>=99.5F,unable to take PO


Aspirin (Ecotrin)  81 mg PO DAILY [facility]


   Last Admin: 03/20/18 09:30 Dose:  81 mg


Atorvastatin Calcium (Lipitor)  20 mg PO DIN [facility]


   Last Admin: 03/19/18 17:12 Dose:  20 mg


Bicalutamide (Casodex)  50 mg PO DAILY [facility]


   Last Admin: 03/20/18 09:35 Dose:  50 mg


Bisacodyl (Dulcolax)  10 mg RC Q48H [facility]


   Last Admin: 03/19/18 11:40 Dose:  10 mg


Digoxin (Digoxin)  0.125 mg PO DAILY [facility]


   Last Admin: 03/20/18 09:30 Dose:  0.125 mg


Diltiazem HCl (Cardizem Cd)  240 mg PO DAILY [facility]


   Last Admin: 03/20/18 09:30 Dose:  240 mg


Docusate Sodium (Colace)  100 mg PO TID [facility]


   Last Admin: 03/20/18 09:30 Dose:  100 mg


Furosemide (Lasix)  20 mg IVP Q12 [facility]


   Last Admin: 03/20/18 09:30 Dose:  20 mg


Heparin Sodium/Sodium Chloride (Heparin 97380 Units/250ml 1/2 Normal Saline)  25

,000 units in 250 mls @ 9.906 mls/hr IV .Q24H [facility]; 12 UNITS/KG/HR


   PRN Reason: Protocol


   Last Admin: 03/20/18 04:59 Dose:  8 units/kg/hr, 6.604 mls/hr


Vancomycin HCl (Vancomycin 1gm)  1 gm in 250 mls @ 167 mls/hr IVPB Q12H [facility]


   PRN Reason: Protocol


   Last Admin: 03/20/18 03:28 Dose:  167 mls/hr


Levalbuterol HCl (Xopenex)  0.63 mg IH [id number]


   Last Admin: 03/20/18 07:51 Dose:  0.63 mg


Levalbuterol HCl (Xopenex)  0.63 mg IH Q2H PRN


   PRN Reason: Shortness of Breath


Magnesium Oxide (Mag-Ox)  400 mg PO BID [facility]


   Last Admin: 03/20/18 09:31 Dose:  400 mg


Metoprolol Tartrate (Lopressor)  50 mg PO BID [facility]


   Last Admin: 03/20/18 09:30 Dose:  50 mg


Mupirocin (Bactroban Ointment)  0 gm TOP BID [facility]


   Last Admin: 03/19/18 17:12 Dose:  1 applic


Ondansetron HCl (Zofran Tab)  4 mg PO Q4 PRN


   PRN Reason: Nausea/Vomiting


   Last Admin: 03/16/18 08:59 Dose:  4 mg


Oxycodone HCl (Oxycodone Immediate Release Tab)  5 mg PO Q6H PRN


   PRN Reason: Pain, moderate (4-7)


Pantoprazole Sodium (Protonix Ec Tab)  40 mg PO DAILY [facility]


   Last Admin: 03/20/18 09:31 Dose:  40 mg


Polyethylene Glycol (Miralax)  17 gm PO TID [facility]


   Last Admin: 03/20/18 09:31 Dose:  17 gm











- Labs


Labs: 


 





 03/18/18 06:00 





 03/18/18 06:00 





 











PT  17.5 SECONDS (9.4-12.5)  H  03/20/18  05:10    


 


INR  1.51  (0.93-1.08)  H  03/20/18  05:10    


 


APTT  86.2 Seconds (25.1-36.5)  H  03/20/18  07:55

## 2018-03-21 NOTE — DS
HISTORY OF PRESENT ILLNESS:  Patient is seen in room 260, bed 1.  Patient

is lying in the bed.  Patient is alert, awake, responsive.  Overnight

events were noted.  Patient was seen by the palliative care nurse.  Patient

was made DNR/DNI with patient and patient's son agreement.  Patient was

then later on referred to home hospice, which patient and patient's son

agreed to.  Patient is seen lying in the bed.



PHYSICAL EXAMINATION:

VITAL SIGNS:  T-max 97.5.  Telemetry shows atrial fibrillation.  Heart rate

79, blood pressure 121/67, 119/68.  Respiration 20.  O2 sat 98%.

HEENT:  Head examination normocephalic, atraumatic.  HEENT examination

shows  pinkish conjunctivae.  Anicteric sclerae.  No oropharyngeal lesion.

NECK:  No neck rigidity.  _____.

CHEST:  Kyphosis.

LUNGS:  Decreased breath sound at the bases, left more than the right.

CARDIOVASCULAR:  Shows S1 and S2, regular rhythm.  Positive systolic murmur

at left sternal border, right second intercostal space, left second

intercostal space.

ABDOMEN:  Much less distended.  Soft.  Positive bowel sounds.

GENITALIA:  Male.  Positive Fraire catheter.

EXTREMITIES:  Shows positive dressing of the right lower extremity. 

Positive skin changes of the bilateral lower extremities.  Decreased

pulses.  Positive gangrenous changes of the toes noted.



DIAGNOSTICS:  None from today.



IMPRESSION AND PLAN:

1.  Stage IV metastatic adenocarcinoma of the prostate to the bones.

2.  Severe bilateral lower extremities peripheral vascular disease with

complete occlusion of the bilateral superficial femoral artery.

3.  Pulseless cold right foot with right foot ischemia secondary to severe

peripheral vascular disease.

4.  Atrial fibrillation.

5.  Hyponatremia secondary to syndrome of inappropriate secretion of

antidiuretic hormone.

6.  Normocytic anemia.

7.  Transaminitis.

8.  Mild hyperbilirubinemia.

9.  Deconditioning.

10.  Gait dysfunction.

11.  Right foot distal first metatarsal cortical erosion, possible

osteomyelitis.

12.  Bilateral superficial femoral artery complete occlusion with no flow

in the popliteal and tibialis and peroneal artery.

13.  Questionable ileus versus partial obstruction.

14.  Hypertension.

15.  Severely abnormal resting ankle-brachial indices.

16.  Aortoiliac disease, right more than the left with 61 mm gradient

between the arm and the right low thigh pressure and 25 mm gradient between

the arm and the left low thigh pressure.

17.  Severely blunted bilateral calf peripheral vascular resistance

waveform consistent with bilateral superficial femoral artery disease.

18.  Constipation.

19.  Bilateral lower extremity venous stasis.

20.  Dilated nonischemic cardiomyopathy with ejection fraction of 20%.

21.  Dyslipidemia.

22.  Hypomagnesemia.



Plan at this time, patient seen by the palliative care nurse Nini Fountain.  Patient's son and the patient agreed for living will advance

directive and DNR.  POLST was completed after the patient was made DNR/DNI.

Patient's case was referred to [facility], and patient is

accepted to home hospice, which patient and the patient's son is in

agreement with.  At this time, patient is to be discharged on home hospice.



CURRENT MEDICATIONS:

1.  Cardizem  mg daily.

2.  Casodex 50 mg daily.

3.  Colace 100 mg three times a day.

4.  Digoxin 0.125 mg p.o. daily.

5.  Dulcolax suppository 10 mg suppository q. 48 hours.

6.  Ecotrin 81 mg daily.

7.  Heparin drip, which will be discontinued upon discharge.

8.  Lasix 20 mg IV q. 12.

9.  Lipitor 20 mg daily.

10.  Lopressor 50 mg twice a day.

11.  Magnesium oxide 400 mg twice a day.

12.  MiraLax 17 g three times a day.

13.  Oxycodone 5 mg q. 6 p.r.n.

14.  Protonix 40 mg daily.

15.  Tylenol 650 suppository p.o. q. 6 p.r.n.

16.  Vancomycin 1 g IV q. 12.

17.  Xopenex nebulizer 0.63 mg q. 6 hours round-the-clock, q. 2 hours

p.r.n.

18.  Zofran 4 mg p.o. q. 4 hours p.r.n.



Patient will be discharged to home hospice today because all the

arrangements are made.



Patient's ambulatory care medications are Tylenol 650 mg q. 6 p.o.

suppository p.r.n., allopurinol 300 mg daily, Eliquis 2.5 twice a day,

Ecotrin 81 mg daily, Lipitor 40 mg daily, Casodex 50 mg daily, digoxin

0.125 mg daily, Cardizem  mg daily, Colace 100 mg three times a day,

Lasix 40 mg once or twice a day, Xopenex nebulizer 0.63 mg round-the-clock

q. 6 hours and q. 2 hours p.r.n., magnesium oxide 400 mg twice a day,

Lopressor 50 mg twice a day, Zofran 4 mg q. 4 hours p.r.n., Protonix 40 mg

daily, MiraLax 17 g twice a day.



Patient's son and the patient has been extensively explained about the

diagnosis since the first admission, which was in 02/2018.  I have

explained to the patient and the patient's son about extremely poor

prognosis with metastatic adenocarcinoma of the prostate with associated

complications.  Patient and the patient's son understand the patient's

overall poor prognosis, and patient is now referred to home hospice after

patient is made DNR/DNI.



Time spent in the entire discharge process more than 45 minutes.



Dictated and electronically signed, not read.





__________________________________________

Dale Márquez MD



DD:  03/20/2018 10:52:34

DT:  03/20/2018 11:01:49

Job # [id number]